# Patient Record
Sex: MALE | Race: WHITE | NOT HISPANIC OR LATINO | Employment: OTHER | URBAN - METROPOLITAN AREA
[De-identification: names, ages, dates, MRNs, and addresses within clinical notes are randomized per-mention and may not be internally consistent; named-entity substitution may affect disease eponyms.]

---

## 2017-01-02 PROBLEM — E83.42 HYPOMAGNESEMIA: Status: ACTIVE | Noted: 2017-01-02

## 2017-01-03 PROBLEM — E83.42 HYPOMAGNESEMIA: Status: RESOLVED | Noted: 2017-01-02 | Resolved: 2017-01-03

## 2017-01-09 ENCOUNTER — TELEPHONE (OUTPATIENT)
Dept: FAMILY MEDICINE | Facility: CLINIC | Age: 82
End: 2017-01-09

## 2017-01-09 NOTE — TELEPHONE ENCOUNTER
----- Message from Nova Chand sent at 1/9/2017  1:16 PM CST -----  Contact: Issac Conn [phone]  Issac Conn is calling to get orders for PT. He can take a verbal order and can would like to explain to the nurse on the phone. Please call  [phone].

## 2017-01-10 ENCOUNTER — OFFICE VISIT (OUTPATIENT)
Dept: FAMILY MEDICINE | Facility: CLINIC | Age: 82
End: 2017-01-10
Payer: MEDICARE

## 2017-01-10 VITALS
RESPIRATION RATE: 18 BRPM | BODY MASS INDEX: 24.55 KG/M2 | DIASTOLIC BLOOD PRESSURE: 60 MMHG | WEIGHT: 171.5 LBS | OXYGEN SATURATION: 98 % | SYSTOLIC BLOOD PRESSURE: 140 MMHG | HEIGHT: 70 IN | HEART RATE: 77 BPM

## 2017-01-10 DIAGNOSIS — E78.5 HYPERLIPIDEMIA, UNSPECIFIED HYPERLIPIDEMIA TYPE: ICD-10-CM

## 2017-01-10 DIAGNOSIS — E03.9 ACQUIRED HYPOTHYROIDISM: ICD-10-CM

## 2017-01-10 DIAGNOSIS — I12.9 CKD STAGE 4 SECONDARY TO HYPERTENSION: ICD-10-CM

## 2017-01-10 DIAGNOSIS — N18.4 CKD STAGE 4 SECONDARY TO HYPERTENSION: ICD-10-CM

## 2017-01-10 DIAGNOSIS — I25.118 CORONARY ARTERY DISEASE INVOLVING NATIVE CORONARY ARTERY WITH OTHER FORM OF ANGINA PECTORIS, UNSPECIFIED WHETHER NATIVE OR TRANSPLANTED HEART: ICD-10-CM

## 2017-01-10 DIAGNOSIS — Z09 HOSPITAL DISCHARGE FOLLOW-UP: Primary | ICD-10-CM

## 2017-01-10 PROCEDURE — 99204 OFFICE O/P NEW MOD 45 MIN: CPT | Mod: S$PBB,,, | Performed by: FAMILY MEDICINE

## 2017-01-10 PROCEDURE — 99213 OFFICE O/P EST LOW 20 MIN: CPT | Mod: PBBFAC,PO | Performed by: FAMILY MEDICINE

## 2017-01-10 PROCEDURE — 99999 PR PBB SHADOW E&M-EST. PATIENT-LVL III: CPT | Mod: PBBFAC,,, | Performed by: FAMILY MEDICINE

## 2017-01-10 RX ORDER — BLOOD SUGAR DIAGNOSTIC
STRIP MISCELLANEOUS DAILY
COMMUNITY
Start: 2016-11-28

## 2017-01-10 RX ORDER — OMEGA-3-ACID ETHYL ESTERS 1 G/1
1 CAPSULE, LIQUID FILLED ORAL 2 TIMES DAILY
Qty: 60 CAPSULE | Refills: 3 | Status: SHIPPED | OUTPATIENT
Start: 2017-01-10 | End: 2017-05-12 | Stop reason: SDUPTHER

## 2017-01-10 NOTE — MR AVS SNAPSHOT
[facility]  [address]  [place] [address]  Phone: [phone]  Fax: [phone]                  Jose Maria Jr.   1/10/2017 10:00 AM   Office Visit    Description:  Male : 1932   Provider:  Lamar Felix MD   Department:  [facility]           Reason for Visit     Hospital Follow Up           Diagnoses this Visit        Comments    Hospital discharge follow-up    -  Primary            To Do List           Goals (5 Years of Data)     None      Follow-Up and Disposition     Return in about 3 months (around 4/10/2017).       These Medications        Disp Refills Start End    omega-3 acid ethyl esters (LOVAZA) 1 gram capsule 60 capsule 3 1/10/2017     Take 1 capsule (1 g total) by mouth 2 (two) times daily. - Oral    Pharmacy: [facility] [place], [place] - [address] Ph #: [phone]         [facility] On Call     [facility] On Call Nurse [facility] Line -  Assistance  Registered nurses in the [facility] On Call Center provide clinical advisement, health education, appointment booking, and other advisory services.  Call for this free service at [phone].             Medications           Message regarding Medications     Verify the changes and/or additions to your medication regime listed below are the same as discussed with your clinician today.  If any of these changes or additions are incorrect, please notify your healthcare provider.        CHANGE how you are taking these medications     Start Taking Instead of    omega-3 acid ethyl esters (LOVAZA) 1 gram capsule omega-3 acid ethyl esters (LOVAZA) 1 gram capsule    Dosage:  Take 1 capsule (1 g total) by mouth 2 (two) times daily. Dosage:  Take 1 g by mouth 2 (two) times daily.    Reason for Change:  Reorder            Verify that the below list of medications is an accurate representation of the medications you are currently taking.  If none reported, the list may be blank. If incorrect,  please contact your healthcare provider. Carry this list with you in case of emergency.           Current Medications     ACCU-CHEK TANIAK PLUS TEST STRP Strp     albuterol 90 mcg/actuation inhaler Inhale 2 puffs into the lungs every 6 (six) hours as needed for Wheezing.    allopurinol (ZYLOPRIM) 100 MG tablet Take 1 tablet (100 mg total) by mouth once daily.    alprazolam (XANAX) 0.5 MG tablet Take 1 tablet (0.5 mg total) by mouth 2 (two) times daily as needed for Anxiety.    amlodipine (NORVASC) 10 MG tablet Take 1 tablet (10 mg total) by mouth once daily.    aspirin 81 MG Chew Take 1 tablet (81 mg total) by mouth once daily.    benzonatate (TESSALON) 100 MG capsule Take 1 capsule (100 mg total) by mouth 3 (three) times daily as needed for Cough.    clopidogrel (PLAVIX) 75 mg tablet Take 75 mg by mouth once daily.     cyanocobalamin 500 mcg tablet Take 2 tablets (1,000 mcg total) by mouth once daily.    finasteride (PROSCAR) 5 mg tablet Take 5 mg by mouth once daily.     guaifenesin (MUCINEX) 600 mg 12 hr tablet Take 1 tablet (600 mg total) by mouth 2 (two) times daily.    hydrALAZINE (APRESOLINE) 100 MG tablet Take 1 tablet (100 mg total) by mouth every 8 (eight) hours.    levoFLOXacin (LEVAQUIN) 250 MG tablet Take 1 tablet (250 mg total) by mouth every other day.    levothyroxine (SYNTHROID) 25 MCG tablet Take 25 mcg by mouth once daily.    MAGNESIUM OXIDE (MAG-OXIDE ORAL) Take 240 mg by mouth once daily.    metoprolol tartrate (LOPRESSOR) 25 MG tablet Take 1/2 tablet (12.5mg) by mouth twice daily    nitroGLYCERIN (NITROSTAT) 0.4 MG SL tablet Place 1 tablet (0.4 mg total) under the tongue every 5 (five) minutes as needed for Chest pain.    omega-3 acid ethyl esters (LOVAZA) 1 gram capsule Take 1 capsule (1 g total) by mouth 2 (two) times daily.    omeprazole (PRILOSEC) 40 MG capsule Take 40 mg by mouth once daily.    ondansetron (ZOFRAN) 4 MG tablet Take 1 tablet (4 mg total) by mouth every 6 (six) hours as  "needed for Nausea.    rosuvastatin (CRESTOR) 5 MG tablet Take 5 mg by mouth every Mon, Wed, Fri.     tamsulosin (FLOMAX) 0.4 mg Cp24 Take 0.4 mg by mouth once daily.    tramadol (ULTRAM) 50 mg tablet Take 1 tablet (50 mg total) by mouth every 6 (six) hours as needed for Pain.           Clinical Reference Information           Vital Signs - Last Recorded  Most recent update: 1/10/2017 10:06 AM by Renuka Singh LPN    BP Pulse Resp Ht Wt SpO2    (!) 140/60 (BP Location: Left arm, Patient Position: Sitting, BP Method: Manual) 77 18 5' 10" (1.778 m) 77.8 kg (171 lb 8.3 oz) 98%    BMI                24.61 kg/m2          Blood Pressure          Most Recent Value    BP  (!)  140/60      Allergies as of 1/10/2017     Benadryl [Diphenhydramine Hcl]    Versed [Midazolam]      Immunizations Administered on Date of Encounter - 1/10/2017     None      [facility] Sign-Up     Activating your [facility] account is as easy as 1-2-3!     1) Visit [email or web address], select Sign Up Now, enter this activation code and your date of birth, then select Next.  [email or web address]  Expires: 2/17/2017  2:44 PM      2) Create a username and password to use when you visit [facility] in the future and select a security question in case you lose your password and select Next.    3) Enter your e-mail address and click Sign Up!    Additional Information  If you have questions, please e-mail [email or web address] or call [phone] to talk to our [facility] staff. Remember, [facility] is NOT to be used for urgent needs. For medical emergencies, dial 911.         "

## 2017-01-10 NOTE — PROGRESS NOTES
Subjective:       Patient ID: Jose Maria Jr. is a 84 y.o. male.    Chief Complaint: Hospital Follow Up    HPI 84 year old male here for hospital follow up. He was discharged on 1/3/17 for pneumonia. He was subsequently found to have CHF excarbation, LORENZO on CKD, and was transfused one unit PRBC due to anemia of chronic disease. Patient is taking levaquin every other day and has one pill left. Appetite is slowly returning to baseline. Patient is ambulating without difficulty.   Patient denies diarrhea/vomiting. Patient urinates frequently, which is his baseline.   In January of 2016, patient had a MI and had two cardiac stents placed. He takes asa and plavix. In 1991, patient had a 2 vessel bypass.   Paitent had CKD for past 30 years but during hospitalization was found to have LORENZO on CKD. Patient followed up with Nephrology yesterday.   Patient lives at home with wife. He enjoys cooking. He is able to perform activities of daily living without difficulty. He currently has PT/OT and home health which he finds extremely helpful. No recent falls. He has a walker if needed.   Review of Systems   Constitutional: Negative for chills and fever.   Respiratory: Positive for cough. Negative for chest tightness and shortness of breath.    Cardiovascular: Negative for chest pain and leg swelling.   Gastrointestinal: Negative for abdominal pain, diarrhea, nausea and vomiting.   Genitourinary: Negative for dysuria and hematuria.   Psychiatric/Behavioral: Negative for agitation and behavioral problems.       Objective:      Vitals:    01/10/17 1005   BP: (!) 140/60   Pulse: 77   Resp: 18   o2 sats 98% on RA  Physical Exam   Constitutional: He is oriented to person, place, and time. He appears well-developed and well-nourished.   HENT:   Head: Normocephalic and atraumatic.   Mouth/Throat: No oropharyngeal exudate.   Eyes: EOM are normal. Right eye exhibits no discharge. Left eye exhibits no discharge.   Cardiovascular: Normal  rate and regular rhythm.    Pulmonary/Chest: Effort normal. He has no wheezes.   Abdominal: Soft. There is no tenderness.   Musculoskeletal: He exhibits no edema or tenderness.   Neurological: He is alert and oriented to person, place, and time.   Psychiatric: He has a normal mood and affect. His behavior is normal.   Vitals reviewed.      Assessment:       1. Hospital discharge follow-up    2. Coronary artery disease involving native coronary artery with other form of angina pectoris, unspecified whether native or transplanted heart    3. 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 Acquired hypothyroidism    5. Hyperlipidemia, unspecified hyperlipidemia type        Plan:         1. Hospital discharge follow up for pneumonia: doing well. Finishing abx. Breathing without difficulty. Albuterol prn. Tessalon prn.   2. CKD: followed by . Labs pending.   3. CAD: on asa plavix. Patient to schedule f/u appointment with  for next week. Continue lovaza and crestor  4. HTN: above goal of 130/90. Will monitor. Continue hydralazine, metoprolol. Lisinpril and lasix held until evaluation by nephro/cardio  5. 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  Dispense: 60 capsule; Refill: 3      Return in about 3 months (around 4/10/2017).

## 2017-01-10 NOTE — TELEPHONE ENCOUNTER
----- Message from Sarah Beth Diaz sent at 1/10/2017  9:11 AM CST -----  Contact: [facility].[phone]  Need verbal orders for physical therapy.

## 2017-01-11 ENCOUNTER — TELEPHONE (OUTPATIENT)
Dept: FAMILY MEDICINE | Facility: CLINIC | Age: 82
End: 2017-01-11

## 2017-01-11 NOTE — TELEPHONE ENCOUNTER
----- Message from Nova Chand sent at 1/11/2017  8:52 AM CST -----  Issac with [facility] is calling to get verbal order on PT/OT for pt.

## 2017-01-20 PROBLEM — B34.9 VIRAL SYNDROME: Status: ACTIVE | Noted: 2017-01-20

## 2017-01-21 PROBLEM — N17.9 ACUTE RENAL FAILURE: Status: ACTIVE | Noted: 2017-01-21

## 2017-02-05 PROBLEM — N28.1 RENAL CYST: Status: ACTIVE | Noted: 2017-02-05

## 2017-02-05 PROBLEM — R80.1 PERSISTENT PROTEINURIA: Status: ACTIVE | Noted: 2017-02-05

## 2017-02-05 PROBLEM — N25.81 SECONDARY HYPERPARATHYROIDISM: Status: ACTIVE | Noted: 2017-02-05

## 2017-02-13 ENCOUNTER — PATIENT MESSAGE (OUTPATIENT)
Dept: FAMILY MEDICINE | Facility: CLINIC | Age: 82
End: 2017-02-13

## 2017-02-16 ENCOUNTER — OFFICE VISIT (OUTPATIENT)
Dept: FAMILY MEDICINE | Facility: CLINIC | Age: 82
End: 2017-02-16
Payer: MEDICARE

## 2017-02-16 VITALS
WEIGHT: 165.13 LBS | SYSTOLIC BLOOD PRESSURE: 150 MMHG | RESPIRATION RATE: 16 BRPM | HEART RATE: 77 BPM | DIASTOLIC BLOOD PRESSURE: 78 MMHG | OXYGEN SATURATION: 96 % | BODY MASS INDEX: 23.69 KG/M2

## 2017-02-16 DIAGNOSIS — F41.9 ANXIETY: Primary | ICD-10-CM

## 2017-02-16 DIAGNOSIS — I10 ESSENTIAL HYPERTENSION: ICD-10-CM

## 2017-02-16 PROBLEM — N17.9 ACUTE RENAL FAILURE: Status: RESOLVED | Noted: 2017-01-21 | Resolved: 2017-02-16

## 2017-02-16 PROBLEM — B34.9 VIRAL SYNDROME: Status: RESOLVED | Noted: 2017-01-20 | Resolved: 2017-02-16

## 2017-02-16 PROCEDURE — 99213 OFFICE O/P EST LOW 20 MIN: CPT | Mod: S$PBB,,, | Performed by: FAMILY MEDICINE

## 2017-02-16 PROCEDURE — 99213 OFFICE O/P EST LOW 20 MIN: CPT | Mod: PBBFAC,PO | Performed by: FAMILY MEDICINE

## 2017-02-16 PROCEDURE — 99999 PR PBB SHADOW E&M-EST. PATIENT-LVL III: CPT | Mod: PBBFAC,,, | Performed by: FAMILY MEDICINE

## 2017-02-16 RX ORDER — ALPRAZOLAM 0.25 MG/1
TABLET ORAL
Refills: 0 | COMMUNITY
Start: 2017-01-22 | End: 2017-03-10 | Stop reason: SDUPTHER

## 2017-02-16 RX ORDER — ESCITALOPRAM OXALATE 10 MG/1
10 TABLET ORAL DAILY
Qty: 30 TABLET | Refills: 0 | Status: SHIPPED | OUTPATIENT
Start: 2017-02-16 | End: 2017-02-24

## 2017-02-16 NOTE — PROGRESS NOTES
Subjective:       Patient ID: Jose Maria Jr. is a 84 y.o. male.    Chief Complaint: Follow-up    HPI 84 year old male here with his wife for ED follow up for SOB. Patient had EKG/CXR which were normal. Patients symptoms were consistent with panic attack. Patient states he has life long anxiety. Patient has been taking xanax prn. He takes 0.25 mg at 8 pm and states he still feels drowsy throughout the following day.  He is interested in taking maintenance medication to help with anxiety. No suicidal thoughts/homicidal thoughts. Patient has lost interest in his hobbies. He denies CP/SOB. No change in appetite.     Review of Systems   Constitutional: Positive for fatigue. Negative for chills and fever.   Respiratory: Negative for chest tightness and shortness of breath.    Cardiovascular: Negative for chest pain and leg swelling.   Gastrointestinal: Negative for abdominal pain, diarrhea, nausea and vomiting.   Psychiatric/Behavioral: Positive for dysphoric mood. Negative for self-injury, sleep disturbance and suicidal ideas. The patient is nervous/anxious.        Objective:      Vitals:    02/16/17 0910   BP: (!) 150/78   Pulse: 77   Resp: 16     Physical Exam   Constitutional: He is oriented to person, place, and time. He appears well-developed and well-nourished. No distress.   Cardiovascular: Normal rate and regular rhythm.    Pulmonary/Chest: Effort normal. He has no wheezes.   Abdominal: Soft. There is no tenderness.   Musculoskeletal: He exhibits no edema or tenderness.   Neurological: He is alert and oriented to person, place, and time.   Psychiatric: He has a normal mood and affect. His behavior is normal. Judgment and thought content normal.   Vitals reviewed.      Assessment:       1. Anxiety    2. Essential hypertension        Plan:       Anxiety  -start lexapro, side effects reviewed with patient and wife. Advised on taking xanax only if absolutely needed due to prolonged effects.   -advised on  continuing hobbies as tolerated.   RTC 3 weeks for f/u.    Essential hypertension  Above goal. Likely anxiety as patient has had elevated BP at previous visits. Will monitor.     Other orders  -     escitalopram oxalate (LEXAPRO) 10 MG tablet; Take 1 tablet (10 mg total) by mouth once daily.  Dispense: 30 tablet; Refill: 0      Return in about 3 weeks (around 3/9/2017).

## 2017-02-16 NOTE — MR AVS SNAPSHOT
[facility]  [address]  [place] [address]  Phone: [phone]  Fax: [phone]                  Jose Maria Jr.   2017 9:20 AM   Office Visit    Description:  Male : 1932   Provider:  Lamar Felix MD   Department:  [facility]           Reason for Visit     Follow-up           Diagnoses this Visit        Comments    Anxiety    -  Primary     Essential hypertension                To Do List           Future Appointments        Provider Department Dept Phone    2017 9:00 AM Lamar Felix MD [facility] [phone]      Goals (5 Years of Data)     None      Follow-Up and Disposition     Return in about 3 weeks (around 3/9/2017).       These Medications        Disp Refills Start End    escitalopram oxalate (LEXAPRO) 10 MG tablet 30 tablet 0 2017    Take 1 tablet (10 mg total) by mouth once daily. - Oral    Pharmacy: [facility] [place] - [address] Ph #: [phone]         [facility] On Call     [facility] On Call Nurse Care Line -  Assistance  Registered nurses in the [facility] On Call Center provide clinical advisement, health education, appointment booking, and other advisory services.  Call for this free service at [phone].             Medications           Message regarding Medications     Verify the changes and/or additions to your medication regime listed below are the same as discussed with your clinician today.  If any of these changes or additions are incorrect, please notify your healthcare provider.        START taking these NEW medications        Refills    escitalopram oxalate (LEXAPRO) 10 MG tablet 0    Sig: Take 1 tablet (10 mg total) by mouth once daily.    Class: Normal    Route: Oral           Verify that the below list of medications is an accurate representation of the medications you are currently taking.  If none reported, the list may be blank. If incorrect,  please contact your healthcare provider. Carry this list with you in case of emergency.           Current Medications     ACCU-CHEK TANIKA PLUS TEST STRP Strp     albuterol 90 mcg/actuation inhaler Inhale 2 puffs into the lungs every 6 (six) hours as needed for Wheezing.    allopurinol (ZYLOPRIM) 100 MG tablet Take 1 tablet (100 mg total) by mouth once daily.    alprazolam (XANAX) 0.25 MG tablet TAKE 1 TABLET BY MOUTH AT BEDTIME AS NEEDED INSOMNIA    amlodipine (NORVASC) 10 MG tablet Take 1 tablet (10 mg total) by mouth once daily.    aspirin 81 MG Chew Take 1 tablet (81 mg total) by mouth once daily.    clopidogrel (PLAVIX) 75 mg tablet Take 75 mg by mouth once daily.     cyanocobalamin 500 mcg tablet Take 2 tablets (1,000 mcg total) by mouth once daily.    ferrous sulfate 324 mg (65 mg iron) TbEC Take 1 tablet (325 mg total) by mouth 2 (two) times daily.    finasteride (PROSCAR) 5 mg tablet Take 5 mg by mouth once daily.     guaifenesin (MUCINEX) 600 mg 12 hr tablet Take 1 tablet (600 mg total) by mouth 2 (two) times daily.    hydrALAZINE (APRESOLINE) 100 MG tablet Take 1 tablet (100 mg total) by mouth every 8 (eight) hours.    levothyroxine (SYNTHROID) 25 MCG tablet Take 25 mcg by mouth once daily.    MAGNESIUM OXIDE (MAG-OXIDE ORAL) Take 400 mg by mouth 2 (two) times daily.     metoprolol tartrate (LOPRESSOR) 25 MG tablet Take 1/2 tablet (12.5mg) by mouth twice daily    nitroGLYCERIN (NITROSTAT) 0.4 MG SL tablet Place 1 tablet (0.4 mg total) under the tongue every 5 (five) minutes as needed for Chest pain.    omega-3 acid ethyl esters (LOVAZA) 1 gram capsule Take 1 capsule (1 g total) by mouth 2 (two) times daily.    ondansetron (ZOFRAN) 4 MG tablet Take 1 tablet (4 mg total) by mouth every 6 (six) hours as needed for Nausea.    rosuvastatin (CRESTOR) 5 MG tablet Take 5 mg by mouth every Mon, Wed, Fri.     tamsulosin (FLOMAX) 0.4 mg Cp24 Take 0.4 mg by mouth once daily.    escitalopram oxalate (LEXAPRO) 10 MG  tablet Take 1 tablet (10 mg total) by mouth once daily.           Clinical Reference Information           Your Vitals Were     BP Pulse Resp Weight SpO2 BMI    150/78 (BP Location: Right arm, Patient Position: Sitting, BP Method: Manual) 77 16 74.9 kg (165 lb 2 oz) 96% 23.69 kg/m2      Blood Pressure          Most Recent Value    BP  (!)  150/78      Allergies as of 2/16/2017     Benadryl [Diphenhydramine Hcl]    Versed [Midazolam]      Immunizations Administered on Date of Encounter - 2/16/2017     None      Language Assistance Services     ATTENTION: Language assistance services are available, free of charge. Please call [phone].      ATENCIÓN: Si habla jasper, tiene a mack disposición servicios gratuitos de asistencia lingüística. Llame al [phone].     CHÚ Ý: N?u b?n nói Ti?ng Vi?t, có các d?ch v? h? tr? ngôn ng? mi?n phí dành cho b?n. G?i s? [phone].         [facility] complies with applicable Federal civil rights laws and does not discriminate on the basis of race, color, national origin, age, disability, or sex.

## 2017-02-20 ENCOUNTER — TELEPHONE (OUTPATIENT)
Dept: FAMILY MEDICINE | Facility: CLINIC | Age: 82
End: 2017-02-20

## 2017-02-20 NOTE — TELEPHONE ENCOUNTER
----- Message from Nova Chand sent at 2/20/2017  3:09 PM CST -----  Contact: Ms Maria [phone]  REFILLS:   Patient is requesting a medication refill.   RX name: Hydralazine   Strength: 100 mg  Directions:   Pharmacy name: [facility]   Pharmacy phone #:

## 2017-03-03 ENCOUNTER — PATIENT MESSAGE (OUTPATIENT)
Dept: FAMILY MEDICINE | Facility: CLINIC | Age: 82
End: 2017-03-03

## 2017-03-06 ENCOUNTER — PATIENT MESSAGE (OUTPATIENT)
Dept: FAMILY MEDICINE | Facility: CLINIC | Age: 82
End: 2017-03-06

## 2017-03-08 ENCOUNTER — PATIENT MESSAGE (OUTPATIENT)
Dept: FAMILY MEDICINE | Facility: CLINIC | Age: 82
End: 2017-03-08

## 2017-03-10 ENCOUNTER — OFFICE VISIT (OUTPATIENT)
Dept: FAMILY MEDICINE | Facility: CLINIC | Age: 82
End: 2017-03-10
Payer: MEDICARE

## 2017-03-10 VITALS
SYSTOLIC BLOOD PRESSURE: 150 MMHG | WEIGHT: 157.44 LBS | DIASTOLIC BLOOD PRESSURE: 88 MMHG | RESPIRATION RATE: 16 BRPM | OXYGEN SATURATION: 98 % | HEART RATE: 68 BPM | BODY MASS INDEX: 22.59 KG/M2

## 2017-03-10 DIAGNOSIS — F41.9 ANXIETY: ICD-10-CM

## 2017-03-10 DIAGNOSIS — N18.4 CKD STAGE 4 SECONDARY TO HYPERTENSION: ICD-10-CM

## 2017-03-10 DIAGNOSIS — I12.9 CKD STAGE 4 SECONDARY TO HYPERTENSION: ICD-10-CM

## 2017-03-10 DIAGNOSIS — I10 ESSENTIAL HYPERTENSION: Primary | ICD-10-CM

## 2017-03-10 PROCEDURE — 99999 PR PBB SHADOW E&M-EST. PATIENT-LVL III: CPT | Mod: PBBFAC,,, | Performed by: FAMILY MEDICINE

## 2017-03-10 PROCEDURE — 99213 OFFICE O/P EST LOW 20 MIN: CPT | Mod: S$PBB,,, | Performed by: FAMILY MEDICINE

## 2017-03-10 PROCEDURE — 99213 OFFICE O/P EST LOW 20 MIN: CPT | Mod: PBBFAC,PO | Performed by: FAMILY MEDICINE

## 2017-03-10 RX ORDER — HYDRALAZINE HYDROCHLORIDE 50 MG/1
50 TABLET, FILM COATED ORAL EVERY 8 HOURS PRN
Qty: 90 TABLET | Refills: 0 | Status: SHIPPED | OUTPATIENT
Start: 2017-03-10 | End: 2017-03-22 | Stop reason: SDUPTHER

## 2017-03-10 RX ORDER — ALPRAZOLAM 0.25 MG/1
0.25 TABLET ORAL NIGHTLY PRN
Qty: 60 TABLET | Refills: 0 | Status: SHIPPED | OUTPATIENT
Start: 2017-03-10 | End: 2017-11-01

## 2017-03-10 RX ORDER — PARICALCITOL 1 UG/1
CAPSULE, LIQUID FILLED ORAL
Status: ON HOLD | COMMUNITY
Start: 2017-02-20 | End: 2017-03-18 | Stop reason: HOSPADM

## 2017-03-10 NOTE — MR AVS SNAPSHOT
[facility]  [address] [place] [address]  Phone: [phone]  Fax: [phone]                  Jose Maria Jr.   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       These Medications        Disp Refills Start End    hydrALAZINE (APRESOLINE) 50 MG tablet 90 tablet 0 3/10/2017 3/10/2018    Take 1 tablet (50 mg total) by mouth every 8 (eight) hours as needed (if systolic blood pressure (top number) is greater than 140). - Oral    Pharmacy: [facility] [place] [address] Ph #: [phone]       alprazolam (XANAX) 0.25 MG tablet 60 tablet 0 3/10/2017     Take 1 tablet (0.25 mg total) by mouth nightly as needed for Anxiety. - Oral    Pharmacy: [facility] [place] [address] Ph #: [phone]         [facility] On Call     [facility] On Call Nurse Care Line -  Assistance  Registered nurses in the [facility] On Call Center provide clinical advisement, health education, appointment booking, and other advisory services.  Call for this free service at [phone].             Medications           Message regarding Medications     Verify the changes and/or additions to your medication regime listed below are the same as discussed with your clinician today.  If any of these changes or additions are incorrect, please notify your healthcare provider.        START taking these NEW medications        Refills    hydrALAZINE (APRESOLINE) 50 MG tablet 0    Sig: Take 1 tablet (50 mg total) by mouth every 8 (eight) hours as needed (if  systolic blood pressure (top number) is greater than 140).    Class: Normal    Route: Oral      CHANGE how you are taking these medications     Start Taking Instead of    alprazolam (XANAX) 0.25 MG tablet alprazolam (XANAX) 0.25 MG tablet    Dosage:  Take 1 tablet (0.25 mg total) by mouth nightly as needed for Anxiety. Dosage:  TAKE 1 TABLET BY MOUTH AT BEDTIME AS NEEDED INSOMNIA    Reason for Change:  Reorder            Verify that the below list of medications is an accurate representation of the medications you are currently taking.  If none reported, the list may be blank. If incorrect, please contact your healthcare provider. Carry this list with you in case of emergency.           Current Medications     ACCU-CHEK TANIKA PLUS TEST STRP Strp     albuterol 90 mcg/actuation inhaler Inhale 2 puffs into the lungs every 6 (six) hours as needed for Wheezing.    allopurinol (ZYLOPRIM) 100 MG tablet Take 1 tablet (100 mg total) by mouth once daily.    alprazolam (XANAX) 0.25 MG tablet Take 1 tablet (0.25 mg total) by mouth nightly as needed for Anxiety.    amlodipine (NORVASC) 10 MG tablet Take 1 tablet (10 mg total) by mouth once daily.    aspirin 81 MG Chew Take 1 tablet (81 mg total) by mouth once daily.    clopidogrel (PLAVIX) 75 mg tablet Take 75 mg by mouth once daily.     cyanocobalamin 500 mcg tablet Take 2 tablets (1,000 mcg total) by mouth once daily.    ferrous sulfate 324 mg (65 mg iron) TbEC Take 1 tablet (325 mg total) by mouth 2 (two) times daily.    finasteride (PROSCAR) 5 mg tablet Take 5 mg by mouth once daily.     guaifenesin (MUCINEX) 600 mg 12 hr tablet Take 1 tablet (600 mg total) by mouth 2 (two) times daily.    levothyroxine (SYNTHROID) 25 MCG tablet Take 25 mcg by mouth once daily.    lisinopril (PRINIVIL,ZESTRIL) 20 MG tablet Take 1 tablet (20 mg total) by mouth once daily.    MAGNESIUM OXIDE (MAG-OXIDE ORAL) Take 400 mg by mouth 2 (two) times daily.     metoprolol tartrate (LOPRESSOR) 25 MG  tablet Take 1/2 tablet (12.5mg) by mouth twice daily    nitroGLYCERIN (NITROSTAT) 0.4 MG SL tablet Place 1 tablet (0.4 mg total) under the tongue every 5 (five) minutes as needed for Chest pain.    omega-3 acid ethyl esters (LOVAZA) 1 gram capsule Take 1 capsule (1 g total) by mouth 2 (two) times daily.    ondansetron (ZOFRAN) 4 MG tablet Take 1 tablet (4 mg total) by mouth every 6 (six) hours as needed for Nausea.    paricalcitol (ZEMPLAR) 1 MCG capsule Take 1 capsule (1 mcg total) by mouth once daily.    paricalcitol (ZEMPLAR) 1 MCG capsule     rosuvastatin (CRESTOR) 5 MG tablet Take 5 mg by mouth every Mon, Wed, Fri.     tamsulosin (FLOMAX) 0.4 mg Cp24 Take 0.4 mg by mouth once daily.    hydrALAZINE (APRESOLINE) 50 MG tablet Take 1 tablet (50 mg total) by mouth every 8 (eight) hours as needed (if systolic blood pressure (top number) is greater than 140).           Clinical Reference Information           Your Vitals Were     BP Pulse Resp Weight SpO2 BMI    150/88 (BP Location: Right arm, Patient Position: Sitting, BP Method: Manual) 68 16 71.4 kg (157 lb 6.5 oz) 98% 22.59 kg/m2      Blood Pressure          Most Recent Value    BP  (!)  150/88      Allergies as of 3/10/2017     Benadryl [Diphenhydramine Hcl]    Versed [Midazolam]      Immunizations Administered on Date of Encounter - 3/10/2017     None      Language Assistance Services     ATTENTION: Language assistance services are available, free of charge. Please call [phone].      ATENCIÓN: Si carlylela jasper, tiene a mack disposición servicios gratuitos de asistencia lingüística. Llame al [phone].     NYDIA Ý: N?u b?n nói Ti?ng Vi?t, có các d?ch v? h? tr? ngôn ng? mi?n phí dành cho b?n. G?i s? [phone].         [facility] complies with applicable Federal civil rights laws and does not discriminate on the basis of race, color, national origin, age, disability, or sex.

## 2017-03-10 NOTE — PROGRESS NOTES
Subjective:       Patient ID: Jose Maria Jr. is a 84 y.o. male.    Chief Complaint: 3 week follow up    HPI 84 year old with his wife for follow up. Patient was seen in ED for side effects of lexapro. He was taken off that medication and has been taking 0.25 mg of xanax nightly which has helped patient. He states he has a little drowsiness in the am from the medication. No recent falls/dizziness. He is worried he will have to go on dialysis. Patient is seen by . He has HTN which is difficult to control. He brought in BP log which shows elevated BP with SBP ranging 140-160s. He has not been using hydralazine prn.   He has been working out regularly with range of motion exercises   Review of Systems   Constitutional: Negative for chills and fever.   Respiratory: Negative for chest tightness and shortness of breath.    Cardiovascular: Negative for chest pain and leg swelling.   Gastrointestinal: Negative for abdominal pain, diarrhea, nausea and vomiting.   Genitourinary: Negative for dysuria.   Neurological: Positive for weakness. Negative for headaches.   Psychiatric/Behavioral: The patient is nervous/anxious.        Objective:      Vitals:    03/10/17 0833   BP: (!) 150/88   Pulse: 68   Resp: 16     Physical Exam   Constitutional: He is oriented to person, place, and time. He appears well-developed and well-nourished. No distress.   Cardiovascular: Normal rate and regular rhythm.    Pulmonary/Chest: Effort normal. He has no wheezes.   Abdominal: Soft. There is no tenderness. There is no rebound and no guarding.   Neurological: He is alert and oriented to person, place, and time.   Psychiatric: He has a normal mood and affect. His behavior is normal. Judgment and thought content normal.   Vitals reviewed.      Assessment:       1. Essential hypertension    2. 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 HTN: uncontrolled. Given hydralazine to use PRN if SBP >140. Appointment with   in 1 week. advised on importance of strict BP control as to not worsen CKD. Patient advised on avoiding nsaids and importance of adequate hydration.   2. Anxiety/insomnia: tolerating lowest dose of xanax. I have advised patient on restarting hobbies and [employer] activities if hes interested.   3. RTC 3 months or sooner prn.   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  Dispense: 90 tablet; Refill: 0  -     alprazolam (XANAX) 0.25 MG tablet; Take 1 tablet (0.25 mg total) by mouth nightly as needed for Anxiety.  Dispense: 60 tablet; Refill: 0      Return in about 3 months (around 6/10/2017).

## 2017-03-13 ENCOUNTER — TELEPHONE (OUTPATIENT)
Dept: FAMILY MEDICINE | Facility: CLINIC | Age: 82
End: 2017-03-13

## 2017-03-13 NOTE — TELEPHONE ENCOUNTER
----- Message from Shiloh Morales sent at 3/13/2017  9:14 AM CDT -----  Came by office looking for xanax script   States was not given to them on last visit    Reach at   [phone]

## 2017-03-17 PROBLEM — R29.6 FREQUENT FALLS: Status: ACTIVE | Noted: 2017-03-17

## 2017-03-17 RX ORDER — LEVOTHYROXINE SODIUM 25 UG/1
25 TABLET ORAL DAILY
Qty: 30 TABLET | Refills: 3 | Status: SHIPPED | OUTPATIENT
Start: 2017-03-17 | End: 2017-05-18 | Stop reason: SDUPTHER

## 2017-03-18 PROBLEM — I95.1 ORTHOSTATIC HYPOTENSION: Status: ACTIVE | Noted: 2017-03-18

## 2017-04-17 PROBLEM — Z09 HOSPITAL DISCHARGE FOLLOW-UP: Status: RESOLVED | Noted: 2017-01-10 | Resolved: 2017-04-17

## 2017-05-12 RX ORDER — OMEGA-3-ACID ETHYL ESTERS 1 G/1
1 CAPSULE, LIQUID FILLED ORAL 2 TIMES DAILY
Qty: 60 CAPSULE | Refills: 3 | Status: SHIPPED | OUTPATIENT
Start: 2017-05-12 | End: 2019-01-21

## 2017-05-12 NOTE — TELEPHONE ENCOUNTER
----- Message from Shiloh Morales sent at 5/12/2017  9:15 AM CDT -----  Refill     OMEGA 3 ETHYL ESTERS 1 GM    [facility]

## 2017-05-18 ENCOUNTER — TELEPHONE (OUTPATIENT)
Dept: FAMILY MEDICINE | Facility: CLINIC | Age: 82
End: 2017-05-18

## 2017-05-18 DIAGNOSIS — E03.9 ACQUIRED HYPOTHYROIDISM: Primary | ICD-10-CM

## 2017-05-18 RX ORDER — LEVOTHYROXINE SODIUM 25 UG/1
25 TABLET ORAL DAILY
Qty: 90 TABLET | Refills: 3 | Status: SHIPPED | OUTPATIENT
Start: 2017-05-18 | End: 2018-06-05 | Stop reason: SDUPTHER

## 2017-05-18 NOTE — TELEPHONE ENCOUNTER
----- Message from Shiloh Morales sent at 5/18/2017  2:23 PM CDT -----  Refill of levothyroxine  25 mcg    One daily  Patient would like to get a 90 day  Supply of this sent to [facility]

## 2017-06-09 ENCOUNTER — OFFICE VISIT (OUTPATIENT)
Dept: FAMILY MEDICINE | Facility: CLINIC | Age: 82
End: 2017-06-09
Payer: MEDICARE

## 2017-06-09 VITALS
HEIGHT: 69 IN | BODY MASS INDEX: 23.8 KG/M2 | HEART RATE: 66 BPM | SYSTOLIC BLOOD PRESSURE: 138 MMHG | DIASTOLIC BLOOD PRESSURE: 70 MMHG | OXYGEN SATURATION: 98 % | RESPIRATION RATE: 16 BRPM | WEIGHT: 160.69 LBS

## 2017-06-09 DIAGNOSIS — R73.03 PREDIABETES: ICD-10-CM

## 2017-06-09 DIAGNOSIS — I10 ESSENTIAL HYPERTENSION: ICD-10-CM

## 2017-06-09 DIAGNOSIS — I12.9 CKD STAGE 4 SECONDARY TO HYPERTENSION: Primary | ICD-10-CM

## 2017-06-09 DIAGNOSIS — N18.4 CKD STAGE 4 SECONDARY TO HYPERTENSION: Primary | ICD-10-CM

## 2017-06-09 PROBLEM — R29.6 FREQUENT FALLS: Status: RESOLVED | Noted: 2017-03-17 | Resolved: 2017-06-09

## 2017-06-09 PROCEDURE — 99214 OFFICE O/P EST MOD 30 MIN: CPT | Mod: S$PBB,,, | Performed by: FAMILY MEDICINE

## 2017-06-09 PROCEDURE — 1126F AMNT PAIN NOTED NONE PRSNT: CPT | Mod: ,,, | Performed by: FAMILY MEDICINE

## 2017-06-09 PROCEDURE — 1159F MED LIST DOCD IN RCRD: CPT | Mod: ,,, | Performed by: FAMILY MEDICINE

## 2017-06-09 PROCEDURE — 99999 PR PBB SHADOW E&M-EST. PATIENT-LVL III: CPT | Mod: PBBFAC,,, | Performed by: FAMILY MEDICINE

## 2017-06-09 PROCEDURE — 99213 OFFICE O/P EST LOW 20 MIN: CPT | Mod: PBBFAC,PO | Performed by: FAMILY MEDICINE

## 2017-06-09 RX ORDER — FINASTERIDE 5 MG/1
5 TABLET, FILM COATED ORAL DAILY
COMMUNITY
End: 2018-03-28 | Stop reason: SDUPTHER

## 2017-06-09 NOTE — PROGRESS NOTES
Subjective:       Patient ID: Jose Maria Jr. is a 85 y.o. male.    Chief Complaint: check up    HPI 85 year old male here for a check up. Patient is active throughout his home. He sticks to a strict low salt diet. He has not had recent falls. He interacts with wife and family members regularly. He cuts grass and enjoys this..   Patient has CKD stage 4 and is being followed by . His weight is 160 lb which is stable. He has not had SOB or signs of volume overload.  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 Patient has HFpEF. Patient was recently taken off of plavix. He takes a daily 81 mg asa.   Patient has gout and takes allopurinol.  Patient has right knee OA and sees . He recently had fluid aspiration from the right knee. He is due to f/u in 4 months.  Patient has insomnia and takes xanax an hour before sleeping.  Patient takes 0.25 mg nightly.     Review of Systems   Constitutional: Negative for chills and fever.   Respiratory: Negative for chest tightness and shortness of breath.    Cardiovascular: Negative for chest pain and leg swelling.   Gastrointestinal: Negative for abdominal pain, diarrhea, nausea and vomiting.   Genitourinary: Negative for dysuria and hematuria.   Psychiatric/Behavioral: Negative for agitation and behavioral problems.       Objective:      Vitals:    06/09/17 0826   BP: (!) 144/64   Pulse: 66   Resp: 16     Physical Exam   Constitutional: He is oriented to person, place, and time. He appears well-developed and well-nourished. No distress.   Cardiovascular: Normal rate and regular rhythm.    Pulmonary/Chest: Effort normal. He has no wheezes.   Abdominal: Soft. There is no tenderness.   Musculoskeletal: He exhibits no edema or tenderness.   Neurological: He is alert and oriented to person, place, and time.   Skin: He is not diaphoretic.   Psychiatric: He has a normal mood and affect. His behavior is normal. Judgment and thought content normal.   Vitals  reviewed.      TSH on 5/10/17  2.47  Assessment:       1. 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 Essential hypertension    3. Acquired hypothyroidism    4. Prediabetes        Plan:         1. CKD-followed by nephro. Patient is doing well and recent GFR shows slight decrease. Per nephro notes, if GFR persistently below 20 will refer for avf placement.  2. HTN: well controlled  3. Prediabetes: check a1c.   RTC 6 months or sooner prn. Immunization records pending from previous PCP.   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

## 2017-08-13 PROBLEM — E83.39 HYPERPHOSPHATEMIA: Status: ACTIVE | Noted: 2017-08-13

## 2017-09-08 PROBLEM — R07.9 CHEST PAIN: Status: ACTIVE | Noted: 2017-09-08

## 2017-09-09 PROBLEM — K21.9 GASTROESOPHAGEAL REFLUX DISEASE WITHOUT ESOPHAGITIS: Status: ACTIVE | Noted: 2017-09-09

## 2017-09-10 ENCOUNTER — PATIENT MESSAGE (OUTPATIENT)
Dept: ADMINISTRATIVE | Facility: OTHER | Age: 82
End: 2017-09-10

## 2017-09-25 ENCOUNTER — HOSPITAL ENCOUNTER (INPATIENT)
Facility: HOSPITAL | Age: 82
LOS: 4 days | Discharge: HOME | DRG: 280 | End: 2017-09-30
Attending: EMERGENCY MEDICINE | Admitting: INTERNAL MEDICINE
Payer: MEDICARE

## 2017-09-25 DIAGNOSIS — R07.9 CHEST PAIN: ICD-10-CM

## 2017-09-25 DIAGNOSIS — I25.10 CAD (CORONARY ARTERY DISEASE), NATIVE CORONARY ARTERY: ICD-10-CM

## 2017-09-25 DIAGNOSIS — I21.4 NSTEMI (NON-ST ELEVATED MYOCARDIAL INFARCTION): Primary | ICD-10-CM

## 2017-09-25 DIAGNOSIS — I12.0 CKD STAGE 5 SECONDARY TO HYPERTENSION: ICD-10-CM

## 2017-09-25 DIAGNOSIS — N18.4 CKD (CHRONIC KIDNEY DISEASE) STAGE 4, GFR 15-29 ML/MIN: ICD-10-CM

## 2017-09-25 DIAGNOSIS — I25.118 CORONARY ARTERY DISEASE INVOLVING NATIVE CORONARY ARTERY WITH OTHER FORM OF ANGINA PECTORIS, UNSPECIFIED WHETHER NATIVE OR TRANSPLANTED HEART: ICD-10-CM

## 2017-09-25 DIAGNOSIS — Z95.1 S/P CABG (CORONARY ARTERY BYPASS GRAFT): ICD-10-CM

## 2017-09-25 DIAGNOSIS — I25.10 ATHEROSCLEROTIC HEART DISEASE OF NATIVE CORONARY ARTERY WITHOUT ANGINA PECTORIS: ICD-10-CM

## 2017-09-25 DIAGNOSIS — R06.02 SOB (SHORTNESS OF BREATH): ICD-10-CM

## 2017-09-25 DIAGNOSIS — I20.9 ANGINA PECTORIS: ICD-10-CM

## 2017-09-25 DIAGNOSIS — R07.9 CHEST PAIN, UNSPECIFIED TYPE: ICD-10-CM

## 2017-09-25 DIAGNOSIS — I50.9 CONGESTIVE HEART FAILURE, UNSPECIFIED CONGESTIVE HEART FAILURE CHRONICITY, UNSPECIFIED CONGESTIVE HEART FAILURE TYPE: ICD-10-CM

## 2017-09-25 DIAGNOSIS — E11.9 DM (DIABETES MELLITUS): ICD-10-CM

## 2017-09-25 DIAGNOSIS — N18.5 CKD STAGE 5 SECONDARY TO HYPERTENSION: ICD-10-CM

## 2017-09-25 DIAGNOSIS — I10 HTN (HYPERTENSION): ICD-10-CM

## 2017-09-25 LAB
ABO + RH BLD: NORMAL
ALBUMIN SERPL BCP-MCNC: 3.3 G/DL
ALP SERPL-CCNC: 74 U/L
ALT SERPL W/O P-5'-P-CCNC: 26 U/L
ANION GAP SERPL CALC-SCNC: 13 MMOL/L
AST SERPL-CCNC: 13 U/L
BASOPHILS # BLD AUTO: 0.01 K/UL
BASOPHILS NFR BLD: 0.2 %
BILIRUB SERPL-MCNC: 0.4 MG/DL
BLD GP AB SCN CELLS X3 SERPL QL: NORMAL
BNP SERPL-MCNC: 3051 PG/ML
BUN SERPL-MCNC: 64 MG/DL
BUN SERPL-MCNC: 64 MG/DL (ref 6–30)
CALCIUM SERPL-MCNC: 9.5 MG/DL
CHLORIDE SERPL-SCNC: 109 MMOL/L (ref 95–110)
CHLORIDE SERPL-SCNC: 110 MMOL/L
CO2 SERPL-SCNC: 18 MMOL/L
CREAT SERPL-MCNC: 4.6 MG/DL
CREAT SERPL-MCNC: 4.6 MG/DL (ref 0.5–1.4)
DIFFERENTIAL METHOD: ABNORMAL
EOSINOPHIL # BLD AUTO: 0 K/UL
EOSINOPHIL NFR BLD: 0 %
ERYTHROCYTE [DISTWIDTH] IN BLOOD BY AUTOMATED COUNT: 15 %
EST. GFR  (AFRICAN AMERICAN): 12.5 ML/MIN/1.73 M^2
EST. GFR  (NON AFRICAN AMERICAN): 10.8 ML/MIN/1.73 M^2
GLUCOSE SERPL-MCNC: 249 MG/DL (ref 70–110)
GLUCOSE SERPL-MCNC: 263 MG/DL
HCT VFR BLD AUTO: 31.7 %
HCT VFR BLD CALC: 30 %PCV (ref 36–54)
HGB BLD-MCNC: 10 G/DL
INR PPP: 1.1
LYMPHOCYTES # BLD AUTO: 0.6 K/UL
LYMPHOCYTES NFR BLD: 13 %
MCH RBC QN AUTO: 30.1 PG
MCHC RBC AUTO-ENTMCNC: 31.5 G/DL
MCV RBC AUTO: 96 FL
MONOCYTES # BLD AUTO: 0 K/UL
MONOCYTES NFR BLD: 0.9 %
NEUTROPHILS # BLD AUTO: 3.8 K/UL
NEUTROPHILS NFR BLD: 85.9 %
PLATELET # BLD AUTO: 212 K/UL
PMV BLD AUTO: 11.7 FL
POC IONIZED CALCIUM: 1.19 MMOL/L (ref 1.06–1.42)
POC TCO2 (MEASURED): 18 MMOL/L (ref 23–29)
POTASSIUM BLD-SCNC: 4 MMOL/L (ref 3.5–5.1)
POTASSIUM SERPL-SCNC: 4.1 MMOL/L
PROT SERPL-MCNC: 6.8 G/DL
PROTHROMBIN TIME: 11.8 SEC
RBC # BLD AUTO: 3.32 M/UL
SAMPLE: ABNORMAL
SODIUM BLD-SCNC: 141 MMOL/L (ref 136–145)
SODIUM SERPL-SCNC: 141 MMOL/L
TROPONIN I SERPL DL<=0.01 NG/ML-MCNC: 0.05 NG/ML
WBC # BLD AUTO: 4.4 K/UL

## 2017-09-25 PROCEDURE — 96366 THER/PROPH/DIAG IV INF ADDON: CPT

## 2017-09-25 PROCEDURE — 96375 TX/PRO/DX INJ NEW DRUG ADDON: CPT

## 2017-09-25 PROCEDURE — 86900 BLOOD TYPING SEROLOGIC ABO: CPT

## 2017-09-25 PROCEDURE — 25000003 PHARM REV CODE 250

## 2017-09-25 PROCEDURE — 85610 PROTHROMBIN TIME: CPT

## 2017-09-25 PROCEDURE — 93010 ELECTROCARDIOGRAM REPORT: CPT | Mod: ,,, | Performed by: INTERNAL MEDICINE

## 2017-09-25 PROCEDURE — 99285 EMERGENCY DEPT VISIT HI MDM: CPT | Mod: 25

## 2017-09-25 PROCEDURE — 93010 ELECTROCARDIOGRAM REPORT: CPT | Mod: 76,,, | Performed by: INTERNAL MEDICINE

## 2017-09-25 PROCEDURE — 86901 BLOOD TYPING SEROLOGIC RH(D): CPT

## 2017-09-25 PROCEDURE — 25000003 PHARM REV CODE 250: Performed by: INTERNAL MEDICINE

## 2017-09-25 PROCEDURE — 99285 EMERGENCY DEPT VISIT HI MDM: CPT | Mod: GC,,, | Performed by: EMERGENCY MEDICINE

## 2017-09-25 PROCEDURE — 99223 1ST HOSP IP/OBS HIGH 75: CPT | Mod: ,,, | Performed by: INTERNAL MEDICINE

## 2017-09-25 PROCEDURE — 83880 ASSAY OF NATRIURETIC PEPTIDE: CPT

## 2017-09-25 PROCEDURE — 63600175 PHARM REV CODE 636 W HCPCS: Performed by: EMERGENCY MEDICINE

## 2017-09-25 PROCEDURE — 84484 ASSAY OF TROPONIN QUANT: CPT

## 2017-09-25 PROCEDURE — 93005 ELECTROCARDIOGRAM TRACING: CPT

## 2017-09-25 PROCEDURE — 80053 COMPREHEN METABOLIC PANEL: CPT

## 2017-09-25 PROCEDURE — 85025 COMPLETE CBC W/AUTO DIFF WBC: CPT

## 2017-09-25 PROCEDURE — 96365 THER/PROPH/DIAG IV INF INIT: CPT

## 2017-09-25 RX ORDER — METOPROLOL TARTRATE 1 MG/ML
10 INJECTION, SOLUTION INTRAVENOUS ONCE
Status: COMPLETED | OUTPATIENT
Start: 2017-09-26 | End: 2017-09-25

## 2017-09-25 RX ORDER — FUROSEMIDE 10 MG/ML
80 INJECTION INTRAMUSCULAR; INTRAVENOUS ONCE
Status: COMPLETED | OUTPATIENT
Start: 2017-09-26 | End: 2017-09-25

## 2017-09-25 RX ORDER — MORPHINE SULFATE 4 MG/ML
4 INJECTION, SOLUTION INTRAMUSCULAR; INTRAVENOUS
Status: COMPLETED | OUTPATIENT
Start: 2017-09-25 | End: 2017-09-25

## 2017-09-25 RX ORDER — NITROGLYCERIN 20 MG/100ML
INJECTION INTRAVENOUS
Status: COMPLETED
Start: 2017-09-25 | End: 2017-09-25

## 2017-09-25 RX ORDER — HEPARIN SODIUM,PORCINE/D5W 25000/250
17 INTRAVENOUS SOLUTION INTRAVENOUS CONTINUOUS
Status: DISCONTINUED | OUTPATIENT
Start: 2017-09-26 | End: 2017-09-26

## 2017-09-25 RX ORDER — NITROGLYCERIN 20 MG/100ML
30 INJECTION INTRAVENOUS CONTINUOUS
Status: DISCONTINUED | OUTPATIENT
Start: 2017-09-25 | End: 2017-09-26

## 2017-09-25 RX ADMIN — NITROGLYCERIN 30 MCG/MIN: 20 INJECTION INTRAVENOUS at 09:09

## 2017-09-25 RX ADMIN — FUROSEMIDE 80 MG: 10 INJECTION, SOLUTION INTRAVENOUS at 11:09

## 2017-09-25 RX ADMIN — METOPROLOL TARTRATE 10 MG: 5 INJECTION INTRAVENOUS at 11:09

## 2017-09-25 RX ADMIN — MORPHINE SULFATE 4 MG: 4 INJECTION INTRAVENOUS at 10:09

## 2017-09-25 RX ADMIN — TICAGRELOR 180 MG: 90 TABLET ORAL at 09:09

## 2017-09-25 RX ADMIN — ALUMINUM HYDROXIDE, MAGNESIUM HYDROXIDE, AND SIMETHICONE 50 ML: 200; 200; 20 SUSPENSION ORAL at 11:09

## 2017-09-26 DIAGNOSIS — I21.4 NON-ST ELEVATION MYOCARDIAL INFARCTION (NSTEMI): Primary | ICD-10-CM

## 2017-09-26 PROBLEM — I50.22 CHRONIC SYSTOLIC HEART FAILURE: Status: ACTIVE | Noted: 2017-09-26

## 2017-09-26 LAB
ALBUMIN SERPL BCP-MCNC: 3 G/DL
ALP SERPL-CCNC: 62 U/L
ALT SERPL W/O P-5'-P-CCNC: 23 U/L
ANION GAP SERPL CALC-SCNC: 12 MMOL/L
AST SERPL-CCNC: 25 U/L
BASOPHILS # BLD AUTO: 0 K/UL
BASOPHILS # BLD AUTO: 0 K/UL
BASOPHILS NFR BLD: 0 %
BASOPHILS NFR BLD: 0 %
BILIRUB SERPL-MCNC: 0.4 MG/DL
BUN SERPL-MCNC: 66 MG/DL
CALCIUM SERPL-MCNC: 9.1 MG/DL
CHLORIDE SERPL-SCNC: 111 MMOL/L
CO2 SERPL-SCNC: 19 MMOL/L
CREAT SERPL-MCNC: 4.7 MG/DL
DIFFERENTIAL METHOD: ABNORMAL
DIFFERENTIAL METHOD: ABNORMAL
EOSINOPHIL # BLD AUTO: 0 K/UL
EOSINOPHIL # BLD AUTO: 0 K/UL
EOSINOPHIL NFR BLD: 0 %
EOSINOPHIL NFR BLD: 0 %
ERYTHROCYTE [DISTWIDTH] IN BLOOD BY AUTOMATED COUNT: 15.2 %
ERYTHROCYTE [DISTWIDTH] IN BLOOD BY AUTOMATED COUNT: 15.3 %
EST. GFR  (AFRICAN AMERICAN): 12.2 ML/MIN/1.73 M^2
EST. GFR  (NON AFRICAN AMERICAN): 10.5 ML/MIN/1.73 M^2
ESTIMATED PA SYSTOLIC PRESSURE: 59.36
FACT X PPP CHRO-ACNC: 0.61 IU/ML
FACT X PPP CHRO-ACNC: <0.1 IU/ML
GLUCOSE SERPL-MCNC: 167 MG/DL
HCT VFR BLD AUTO: 31.9 %
HCT VFR BLD AUTO: 33.1 %
HGB BLD-MCNC: 10 G/DL
HGB BLD-MCNC: 10.8 G/DL
LYMPHOCYTES # BLD AUTO: 0.9 K/UL
LYMPHOCYTES # BLD AUTO: 1.2 K/UL
LYMPHOCYTES NFR BLD: 11.1 %
LYMPHOCYTES NFR BLD: 14.4 %
MAGNESIUM SERPL-MCNC: 2 MG/DL
MCH RBC QN AUTO: 30.6 PG
MCH RBC QN AUTO: 30.6 PG
MCHC RBC AUTO-ENTMCNC: 31.3 G/DL
MCHC RBC AUTO-ENTMCNC: 32.6 G/DL
MCV RBC AUTO: 94 FL
MCV RBC AUTO: 98 FL
MITRAL VALVE REGURGITATION: ABNORMAL
MONOCYTES # BLD AUTO: 0.2 K/UL
MONOCYTES # BLD AUTO: 0.2 K/UL
MONOCYTES NFR BLD: 2.7 %
MONOCYTES NFR BLD: 2.8 %
NEUTROPHILS # BLD AUTO: 6.8 K/UL
NEUTROPHILS # BLD AUTO: 7 K/UL
NEUTROPHILS NFR BLD: 82.7 %
NEUTROPHILS NFR BLD: 86 %
PHOSPHATE SERPL-MCNC: 4.3 MG/DL
PLATELET # BLD AUTO: 187 K/UL
PLATELET # BLD AUTO: 194 K/UL
PMV BLD AUTO: 11.6 FL
PMV BLD AUTO: 11.7 FL
POTASSIUM SERPL-SCNC: 4.8 MMOL/L
PROT SERPL-MCNC: 6.2 G/DL
RBC # BLD AUTO: 3.27 M/UL
RBC # BLD AUTO: 3.53 M/UL
RETIRED EF AND QEF - SEE NOTES: 50 (ref 55–65)
SODIUM SERPL-SCNC: 142 MMOL/L
TRICUSPID VALVE REGURGITATION: ABNORMAL
TROPONIN I SERPL DL<=0.01 NG/ML-MCNC: 10.12 NG/ML
TROPONIN I SERPL DL<=0.01 NG/ML-MCNC: 10.36 NG/ML
TROPONIN I SERPL DL<=0.01 NG/ML-MCNC: 5.91 NG/ML
WBC # BLD AUTO: 7.85 K/UL
WBC # BLD AUTO: 8.42 K/UL

## 2017-09-26 PROCEDURE — 93010 ELECTROCARDIOGRAM REPORT: CPT | Mod: ,,, | Performed by: INTERNAL MEDICINE

## 2017-09-26 PROCEDURE — 85520 HEPARIN ASSAY: CPT | Mod: 91

## 2017-09-26 PROCEDURE — 93010 ELECTROCARDIOGRAM REPORT: CPT | Mod: 77,,, | Performed by: INTERNAL MEDICINE

## 2017-09-26 PROCEDURE — 93306 TTE W/DOPPLER COMPLETE: CPT | Mod: 26,,, | Performed by: INTERNAL MEDICINE

## 2017-09-26 PROCEDURE — 93005 ELECTROCARDIOGRAM TRACING: CPT

## 2017-09-26 PROCEDURE — 25000003 PHARM REV CODE 250: Performed by: STUDENT IN AN ORGANIZED HEALTH CARE EDUCATION/TRAINING PROGRAM

## 2017-09-26 PROCEDURE — 80053 COMPREHEN METABOLIC PANEL: CPT

## 2017-09-26 PROCEDURE — A4216 STERILE WATER/SALINE, 10 ML: HCPCS | Performed by: STUDENT IN AN ORGANIZED HEALTH CARE EDUCATION/TRAINING PROGRAM

## 2017-09-26 PROCEDURE — 25000003 PHARM REV CODE 250: Performed by: INTERNAL MEDICINE

## 2017-09-26 PROCEDURE — 99223 1ST HOSP IP/OBS HIGH 75: CPT | Mod: AI,GC,, | Performed by: INTERNAL MEDICINE

## 2017-09-26 PROCEDURE — 84484 ASSAY OF TROPONIN QUANT: CPT | Mod: 91

## 2017-09-26 PROCEDURE — 63600175 PHARM REV CODE 636 W HCPCS: Performed by: INTERNAL MEDICINE

## 2017-09-26 PROCEDURE — 83735 ASSAY OF MAGNESIUM: CPT

## 2017-09-26 PROCEDURE — 85025 COMPLETE CBC W/AUTO DIFF WBC: CPT

## 2017-09-26 PROCEDURE — 36415 COLL VENOUS BLD VENIPUNCTURE: CPT

## 2017-09-26 PROCEDURE — 84100 ASSAY OF PHOSPHORUS: CPT

## 2017-09-26 PROCEDURE — 20600001 HC STEP DOWN PRIVATE ROOM

## 2017-09-26 PROCEDURE — 93306 TTE W/DOPPLER COMPLETE: CPT

## 2017-09-26 PROCEDURE — 84484 ASSAY OF TROPONIN QUANT: CPT

## 2017-09-26 RX ORDER — OMEGA-3-ACID ETHYL ESTERS 1 G/1
1 CAPSULE, LIQUID FILLED ORAL 2 TIMES DAILY
Status: DISCONTINUED | OUTPATIENT
Start: 2017-09-26 | End: 2017-09-30 | Stop reason: HOSPADM

## 2017-09-26 RX ORDER — ALLOPURINOL 100 MG/1
100 TABLET ORAL DAILY
Status: DISCONTINUED | OUTPATIENT
Start: 2017-09-26 | End: 2017-09-30 | Stop reason: HOSPADM

## 2017-09-26 RX ORDER — TAMSULOSIN HYDROCHLORIDE 0.4 MG/1
0.4 CAPSULE ORAL DAILY
Status: DISCONTINUED | OUTPATIENT
Start: 2017-09-26 | End: 2017-09-30 | Stop reason: HOSPADM

## 2017-09-26 RX ORDER — CALCITRIOL 0.25 UG/1
0.25 CAPSULE ORAL
Status: DISCONTINUED | OUTPATIENT
Start: 2017-09-27 | End: 2017-09-30 | Stop reason: HOSPADM

## 2017-09-26 RX ORDER — FINASTERIDE 5 MG/1
5 TABLET, FILM COATED ORAL DAILY
Status: DISCONTINUED | OUTPATIENT
Start: 2017-09-26 | End: 2017-09-30 | Stop reason: HOSPADM

## 2017-09-26 RX ORDER — SODIUM BICARBONATE 650 MG/1
650 TABLET ORAL DAILY
Status: DISCONTINUED | OUTPATIENT
Start: 2017-09-26 | End: 2017-09-30 | Stop reason: HOSPADM

## 2017-09-26 RX ORDER — LEVOTHYROXINE SODIUM 25 UG/1
25 TABLET ORAL DAILY
Status: DISCONTINUED | OUTPATIENT
Start: 2017-09-26 | End: 2017-09-30 | Stop reason: HOSPADM

## 2017-09-26 RX ORDER — PANTOPRAZOLE SODIUM 40 MG/1
40 TABLET, DELAYED RELEASE ORAL DAILY
Status: DISCONTINUED | OUTPATIENT
Start: 2017-09-26 | End: 2017-09-30 | Stop reason: HOSPADM

## 2017-09-26 RX ORDER — SEVELAMER CARBONATE 800 MG/1
1600 TABLET, FILM COATED ORAL
Status: DISCONTINUED | OUTPATIENT
Start: 2017-09-26 | End: 2017-09-30 | Stop reason: HOSPADM

## 2017-09-26 RX ORDER — ROSUVASTATIN CALCIUM 10 MG/1
40 TABLET, COATED ORAL
Status: DISCONTINUED | OUTPATIENT
Start: 2017-09-27 | End: 2017-09-26

## 2017-09-26 RX ORDER — METOPROLOL TARTRATE 25 MG/1
12.5 TABLET ORAL 2 TIMES DAILY
Status: DISCONTINUED | OUTPATIENT
Start: 2017-09-26 | End: 2017-09-26

## 2017-09-26 RX ORDER — HYDROMORPHONE HYDROCHLORIDE 1 MG/ML
0.5 INJECTION, SOLUTION INTRAMUSCULAR; INTRAVENOUS; SUBCUTANEOUS EVERY 4 HOURS PRN
Status: DISCONTINUED | OUTPATIENT
Start: 2017-09-26 | End: 2017-09-29

## 2017-09-26 RX ORDER — METOPROLOL SUCCINATE 25 MG/1
50 TABLET, EXTENDED RELEASE ORAL DAILY
Status: DISCONTINUED | OUTPATIENT
Start: 2017-09-26 | End: 2017-09-28

## 2017-09-26 RX ORDER — HEPARIN SODIUM,PORCINE/D5W 25000/250
13 INTRAVENOUS SOLUTION INTRAVENOUS CONTINUOUS
Status: DISCONTINUED | OUTPATIENT
Start: 2017-09-26 | End: 2017-09-28

## 2017-09-26 RX ORDER — SODIUM CHLORIDE 0.9 % (FLUSH) 0.9 %
3 SYRINGE (ML) INJECTION EVERY 8 HOURS
Status: DISCONTINUED | OUTPATIENT
Start: 2017-09-26 | End: 2017-09-30 | Stop reason: HOSPADM

## 2017-09-26 RX ORDER — ROSUVASTATIN CALCIUM 10 MG/1
40 TABLET, COATED ORAL DAILY
Status: DISCONTINUED | OUTPATIENT
Start: 2017-09-26 | End: 2017-09-26

## 2017-09-26 RX ORDER — METOPROLOL TARTRATE 25 MG/1
25 TABLET, FILM COATED ORAL 2 TIMES DAILY
Status: DISCONTINUED | OUTPATIENT
Start: 2017-09-26 | End: 2017-09-26

## 2017-09-26 RX ORDER — ISOSORBIDE MONONITRATE 30 MG/1
120 TABLET, EXTENDED RELEASE ORAL DAILY
Status: DISCONTINUED | OUTPATIENT
Start: 2017-09-26 | End: 2017-09-30 | Stop reason: HOSPADM

## 2017-09-26 RX ORDER — NITROGLYCERIN 0.4 MG/1
0.4 TABLET SUBLINGUAL EVERY 5 MIN PRN
Status: DISCONTINUED | OUTPATIENT
Start: 2017-09-26 | End: 2017-09-30 | Stop reason: HOSPADM

## 2017-09-26 RX ORDER — ALPRAZOLAM 0.25 MG/1
0.25 TABLET ORAL ONCE
Status: COMPLETED | OUTPATIENT
Start: 2017-09-26 | End: 2017-09-26

## 2017-09-26 RX ORDER — ROSUVASTATIN CALCIUM 5 MG/1
10 TABLET, COATED ORAL DAILY
Status: DISCONTINUED | OUTPATIENT
Start: 2017-09-26 | End: 2017-09-30 | Stop reason: HOSPADM

## 2017-09-26 RX ORDER — NAPROXEN SODIUM 220 MG/1
81 TABLET, FILM COATED ORAL DAILY
Status: DISCONTINUED | OUTPATIENT
Start: 2017-09-26 | End: 2017-09-30 | Stop reason: HOSPADM

## 2017-09-26 RX ADMIN — SEVELAMER CARBONATE 1600 MG: 800 TABLET, FILM COATED ORAL at 01:09

## 2017-09-26 RX ADMIN — TICAGRELOR 90 MG: 90 TABLET ORAL at 10:09

## 2017-09-26 RX ADMIN — ASPIRIN 81 MG CHEWABLE TABLET 81 MG: 81 TABLET CHEWABLE at 09:09

## 2017-09-26 RX ADMIN — TICAGRELOR 90 MG: 90 TABLET ORAL at 09:09

## 2017-09-26 RX ADMIN — FINASTERIDE 5 MG: 5 TABLET, FILM COATED ORAL at 09:09

## 2017-09-26 RX ADMIN — OMEGA-3-ACID ETHYL ESTERS 1 G: 1 CAPSULE, LIQUID FILLED ORAL at 02:09

## 2017-09-26 RX ADMIN — SODIUM BICARBONATE 650 MG TABLET 650 MG: at 09:09

## 2017-09-26 RX ADMIN — OMEGA-3-ACID ETHYL ESTERS 1 G: 1 CAPSULE, LIQUID FILLED ORAL at 09:09

## 2017-09-26 RX ADMIN — PANTOPRAZOLE SODIUM 40 MG: 40 TABLET, DELAYED RELEASE ORAL at 09:09

## 2017-09-26 RX ADMIN — METOPROLOL SUCCINATE 50 MG: 25 TABLET, EXTENDED RELEASE ORAL at 09:09

## 2017-09-26 RX ADMIN — HYDRALAZINE HYDROCHLORIDE 75 MG: 50 TABLET ORAL at 01:09

## 2017-09-26 RX ADMIN — HEPARIN SODIUM AND DEXTROSE 13 UNITS/KG/HR: 10000; 5 INJECTION INTRAVENOUS at 09:09

## 2017-09-26 RX ADMIN — ISOSORBIDE MONONITRATE 120 MG: 30 TABLET, EXTENDED RELEASE ORAL at 09:09

## 2017-09-26 RX ADMIN — HYDRALAZINE HYDROCHLORIDE 75 MG: 50 TABLET ORAL at 05:09

## 2017-09-26 RX ADMIN — HYDRALAZINE HYDROCHLORIDE 75 MG: 50 TABLET ORAL at 09:09

## 2017-09-26 RX ADMIN — SEVELAMER CARBONATE 1600 MG: 800 TABLET, FILM COATED ORAL at 09:09

## 2017-09-26 RX ADMIN — ALPRAZOLAM 0.25 MG: 0.25 TABLET ORAL at 09:09

## 2017-09-26 RX ADMIN — ALLOPURINOL 100 MG: 100 TABLET ORAL at 09:09

## 2017-09-26 RX ADMIN — ROSUVASTATIN CALCIUM 10 MG: 5 TABLET ORAL at 10:09

## 2017-09-26 RX ADMIN — SODIUM FERRIC GLUCONATE COMPLEX 125 MG: 12.5 INJECTION INTRAVENOUS at 05:09

## 2017-09-26 RX ADMIN — Medication 3 ML: at 10:09

## 2017-09-26 RX ADMIN — LEVOTHYROXINE SODIUM 25 MCG: 25 TABLET ORAL at 05:09

## 2017-09-26 RX ADMIN — TAMSULOSIN HYDROCHLORIDE 0.4 MG: 0.4 CAPSULE ORAL at 09:09

## 2017-09-26 NOTE — ED NOTES
Patient identifiers verified and correct for Jose Maria JrJuan C.    LOC: The patient is awake, alert and aware of environment with an appropriate affect, the patient is oriented x 3 and speaking appropriately.  APPEARANCE: Patient resting uncomfortably and in mild acute distress, patient is clean and well groomed, patient's clothing is properly fastened.  SKIN: The skin is warm and dry, color consistent with ethnicity, patient has normal skin turgor and moist mucus membranes, skin intact, no breakdown or bruising noted.  MUSCULOSKELETAL: Patient moving all extremities spontaneously, no obvious swelling or deformities noted.  RESPIRATORY: Airway is open and patent, respirations are spontaneous, patient has an increased effort and rate of 24, no accessory muscle use noted, bilateral breath sounds even and clear.  CARDIAC: Patient has a normal rate and irregular rhythm, no periphreal edema noted, capillary refill < 3 seconds.  ABDOMEN: Soft and non tender to palpation, no distention noted, normoactive bowel sounds present in all four quadrants.  NEUROLOGIC: Pupils equal bilaterally, eyes open spontaneously, behavior appropriate to situation, follows commands, facial expression symmetrical, bilateral hand grasp equal and even, purposeful motor response noted, normal sensation in all extremities when touched with a finger.

## 2017-09-26 NOTE — PLAN OF CARE
Patient with 2nd troponin 5.9, consistent with NSTEMI.  Would treat medically with dual antiplatelets and heparin gtt for at least 48 hours.  Patient with recent transfusion but no active bleed, H/H stable overnight.  He will need TTE (would order with color flow), beta blocker.  Trend troponin to peak.  Would repeat EKG now that he is chest pain free to evaluate baseline EKG, as the last 2 in our system are similar in appearance but in the setting of ischemia, unclear if these are baseline or changes from baseline.  He does not want angiogram at this time due to likelihood of being on dialysis, this is consistent with previous wishes with his cardiologist, Dr. Gorman.  If he's having more chest pain, he will likely need angiogram.  He should follow with Dr. Gorman either way, he may need angiogram as outpatient given these recurrent episodes.     It is reasonable to transfer to team C/J if desired by the primary team.     Josiah Blakely MD  Cardiology Fellow

## 2017-09-26 NOTE — CONSULTS
"Cardiac Rehab     Jose Maria Jr.   [id number]   9/26/2017       Activity taught: Yes    Cardiac Rehab Phase Taught: Phase 1 & 2    Risk Factors-Modifiable: hyperlipidemia, hypertension, exercise    Risk Factors-Non modifiable: age, gender    Teaching Method: Verbal, Written, Living with Heart Disease Booklet    Understanding: Yes    Response: Patient and wife verbalized understanding and questions answered. Spoke with patient in the [facility] area as he was not in his room. Said he thinks he might want to go if it will help.     Comments: S/P NSTEMI. Discussed cardiac rehab and risk factor modification. Team to refer patient to [facility] Cardiac Rehab Phase II. Educational materials were used in the process and given to the patient. They included "Your Guide to Living with Heart Disease", Phase Two Cardiac Rehabilitation information along with a sample Mediterranean diet.The patient expressed understanding of the teaching and expressed desire to take a role in modifying the risk factors when they return home.        "

## 2017-09-26 NOTE — ASSESSMENT & PLAN NOTE
- Creatinine 4.7 today. Up from baseline of 3.9  - Could represent gradual worsening of CKD as patient refuses dialysis or 2/2 cardiorenal syndrome given NSTEMI and worsening CHF.   - Continue medical management of HTN, CHF, and NSTEMI.  - Sevelamer 1600 mg PO QD with meals.  - Sodium bicarb 650 mg PO QD.   - Edd continue to monitor renal function.

## 2017-09-26 NOTE — SUBJECTIVE & OBJECTIVE
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      Current Outpatient Prescriptions on File Prior to Encounter   Medication Sig    ACCU-CHEK TANIKA PLUS TEST STRP Strp once daily.     allopurinol (ZYLOPRIM) 100 MG tablet Take 1 tablet (100 mg total) by mouth once daily.    alprazolam (XANAX) 0.25 MG tablet Take 1 tablet (0.25 mg total) by mouth nightly as needed for Anxiety.    aspirin 81 MG Chew Take 81 mg by mouth once daily.    calcitRIOL (ROCALTROL) 0.25 MCG Cap Take 1 capsule (0.25 mcg total) by mouth every Mon, Wed, Fri.    cyanocobalamin 500 mcg tablet Take 2 tablets (1,000 mcg total) by mouth once daily.    finasteride (PROSCAR) 5 mg tablet Take 5 mg by mouth once daily.    furosemide (LASIX) 40 MG tablet Take 1 tablet (40 mg total) by mouth once daily.    hydrALAZINE (APRESOLINE) 25 MG tablet Take 3 tablets (75 mg total) by mouth 3 (three) times daily.    isosorbide mononitrate (IMDUR) 120 MG 24 hr tablet Take 1 tablet (120 mg total) by mouth once daily.    levothyroxine (SYNTHROID) 25 MCG tablet Take 1 tablet (25 mcg total) by mouth once daily.    metoprolol tartrate (LOPRESSOR) 25 MG tablet Take 0.5 tablets (12.5 mg total) by mouth 2 (two) times daily.    multivitamin capsule Take 1 capsule by mouth once daily.    nitroGLYCERIN (NITROSTAT) 0.4 MG SL tablet Place 1 tablet (0.4 mg total)  under the tongue every 5 (five) minutes as needed for Chest pain.    omega-3 acid ethyl esters (LOVAZA) 1 gram capsule Take 1 capsule (1 g total) by mouth 2 (two) times daily.    omeprazole (PRILOSEC) 20 MG capsule Take 1 capsule (20 mg total) by mouth once daily.    rosuvastatin (CRESTOR) 40 MG Tab Take 1 tablet (40 mg total) by mouth every Mon, Wed, Fri.    sevelamer HCl (RENAGEL) 800 MG Tab Take 2 tablets (1,600 mg total) by mouth 3 (three) times daily with meals.    sodium bicarbonate 650 MG tablet Take 1 tablet (650 mg total) by mouth once daily.    tamsulosin (FLOMAX) 0.4 mg Cp24 Take 1 capsule (0.4 mg total) by mouth once daily.     Family History     None        Social History Main Topics    Smoking status: Never Smoker    Smokeless tobacco: Never Used    Alcohol use No    Drug use: No    Sexual activity: Not Currently     Review of Systems   Constitutional: Negative for chills, diaphoresis and fever.   HENT: Negative for congestion and sore throat.    Eyes: Negative for photophobia and visual disturbance.   Respiratory: Positive for cough, chest tightness and shortness of breath.    Cardiovascular: Negative for chest pain and palpitations.   Gastrointestinal: Negative for abdominal pain, diarrhea, nausea and vomiting.   Genitourinary: Negative for dysuria and hematuria.   Musculoskeletal: Negative for arthralgias and myalgias.   Skin: Negative for color change and rash.   Neurological: Negative for dizziness, light-headedness and headaches.   Psychiatric/Behavioral: Negative for agitation and confusion.     Objective:     Vital Signs (Most Recent):  Temp: 97.8 °F (36.6 °C) (09/26/17 0724)  Pulse: 68 (09/26/17 0724)  Resp: 16 (09/26/17 0724)  BP: 124/66 (09/26/17 0724)  SpO2: (!) 93 % (09/26/17 0724) Vital Signs (24h Range):  Temp:  [97.7 °F (36.5 °C)-97.8 °F (36.6 °C)] 97.8 °F (36.6 °C)  Pulse:  [62-97] 68  Resp:  [12-24] 16  SpO2:  [91 %-97 %] 93 %  BP: (113-149)/(59-79) 124/66     Weight: 73.3  kg (161 lb 9.6 oz)  Body mass index is 23.86 kg/m².    Physical Exam   Constitutional: He is oriented to person, place, and time. He appears well-developed and well-nourished. No distress.   HENT:   Head: Normocephalic and atraumatic.   Mouth/Throat: Oropharynx is clear and moist.   Eyes: EOM are normal. Pupils are equal, round, and reactive to light.   Neck: Neck supple. No JVD present.   Cardiovascular: Normal rate, regular rhythm and intact distal pulses.  Exam reveals no gallop and no friction rub.    Murmur heard.  Pulmonary/Chest: Effort normal and breath sounds normal. No respiratory distress. He has no wheezes. He has no rales.   Abdominal: Soft. Bowel sounds are normal. He exhibits no distension. There is no tenderness. There is no guarding.   Musculoskeletal: Normal range of motion.   Bilateral lower extremity 2+ pitting edema up to shins.    Neurological: He is alert and oriented to person, place, and time.   Skin: Skin is warm and dry. Capillary refill takes less than 2 seconds. He is not diaphoretic.        Significant Labs:   Recent Results (from the past 24 hour(s))   ISTAT PROCEDURE    Collection Time: 09/25/17  9:32 PM   Result Value Ref Range    POC Glucose 249 (H) 70 - 110 mg/dL    POC BUN 64 (H) 6 - 30 mg/dL    POC Creatinine 4.6 (H) 0.5 - 1.4 mg/dL    POC Sodium 141 136 - 145 mmol/L    POC Potassium 4.0 3.5 - 5.1 mmol/L    POC Chloride 109 95 - 110 mmol/L    POC TCO2 (MEASURED) 18 (L) 23 - 29 mmol/L    POC Ionized Calcium 1.19 1.06 - 1.42 mmol/L    POC Hematocrit 30 (L) 36 - 54 %PCV    Sample JOSEPHINE    CBC auto differential    Collection Time: 09/25/17  9:35 PM   Result Value Ref Range    WBC 4.40 3.90 - 12.70 K/uL    RBC 3.32 (L) 4.60 - 6.20 M/uL    Hemoglobin 10.0 (L) 14.0 - 18.0 g/dL    Hematocrit 31.7 (L) 40.0 - 54.0 %    MCV 96 82 - 98 fL    MCH 30.1 27.0 - 31.0 pg    MCHC 31.5 (L) 32.0 - 36.0 g/dL    RDW 15.0 (H) 11.5 - 14.5 %    Platelets 212 150 - 350 K/uL    MPV 11.7 9.2 - 12.9 fL    Gran  # 3.8 1.8 - 7.7 K/uL    Lymph # 0.6 (L) 1.0 - 4.8 K/uL    Mono # 0.0 (L) 0.3 - 1.0 K/uL    Eos # 0.0 0.0 - 0.5 K/uL    Baso # 0.01 0.00 - 0.20 K/uL    Gran% 85.9 (H) 38.0 - 73.0 %    Lymph% 13.0 (L) 18.0 - 48.0 %    Mono% 0.9 (L) 4.0 - 15.0 %    Eosinophil% 0.0 0.0 - 8.0 %    Basophil% 0.2 0.0 - 1.9 %    Differential Method Automated    Comprehensive metabolic panel    Collection Time: 09/25/17  9:35 PM   Result Value Ref Range    Sodium 141 136 - 145 mmol/L    Potassium 4.1 3.5 - 5.1 mmol/L    Chloride 110 95 - 110 mmol/L    CO2 18 (L) 23 - 29 mmol/L    Glucose 263 (H) 70 - 110 mg/dL    BUN, Bld 64 (H) 8 - 23 mg/dL    Creatinine 4.6 (H) 0.5 - 1.4 mg/dL    Calcium 9.5 8.7 - 10.5 mg/dL    Total Protein 6.8 6.0 - 8.4 g/dL    Albumin 3.3 (L) 3.5 - 5.2 g/dL    Total Bilirubin 0.4 0.1 - 1.0 mg/dL    Alkaline Phosphatase 74 55 - 135 U/L    AST 13 10 - 40 U/L    ALT 26 10 - 44 U/L    Anion Gap 13 8 - 16 mmol/L    eGFR if African American 12.5 (A) >60 mL/min/1.73 m^2    eGFR if non African American 10.8 (A) >60 mL/min/1.73 m^2   Protime-INR    Collection Time: 09/25/17  9:35 PM   Result Value Ref Range    Prothrombin Time 11.8 9.0 - 12.5 sec    INR 1.1 0.8 - 1.2   Troponin I    Collection Time: 09/25/17  9:35 PM   Result Value Ref Range    Troponin I 0.049 (H) 0.000 - 0.026 ng/mL   Type & Screen    Collection Time: 09/25/17  9:35 PM   Result Value Ref Range    Group & Rh AB NEG     Indirect Shorty NEG    Brain natriuretic peptide    Collection Time: 09/25/17  9:35 PM   Result Value Ref Range    BNP 3,051 (H) 0 - 99 pg/mL   CBC auto differential    Collection Time: 09/26/17  6:23 AM   Result Value Ref Range    WBC 7.85 3.90 - 12.70 K/uL    RBC 3.27 (L) 4.60 - 6.20 M/uL    Hemoglobin 10.0 (L) 14.0 - 18.0 g/dL    Hematocrit 31.9 (L) 40.0 - 54.0 %    MCV 98 82 - 98 fL    MCH 30.6 27.0 - 31.0 pg    MCHC 31.3 (L) 32.0 - 36.0 g/dL    RDW 15.2 (H) 11.5 - 14.5 %    Platelets 187 150 - 350 K/uL    MPV 11.7 9.2 - 12.9 fL    Gran # 6.8  1.8 - 7.7 K/uL    Lymph # 0.9 (L) 1.0 - 4.8 K/uL    Mono # 0.2 (L) 0.3 - 1.0 K/uL    Eos # 0.0 0.0 - 0.5 K/uL    Baso # 0.00 0.00 - 0.20 K/uL    Gran% 86.0 (H) 38.0 - 73.0 %    Lymph% 11.1 (L) 18.0 - 48.0 %    Mono% 2.8 (L) 4.0 - 15.0 %    Eosinophil% 0.0 0.0 - 8.0 %    Basophil% 0.0 0.0 - 1.9 %    Differential Method Automated    Comprehensive metabolic panel    Collection Time: 09/26/17  6:23 AM   Result Value Ref Range    Sodium 142 136 - 145 mmol/L    Potassium 4.8 3.5 - 5.1 mmol/L    Chloride 111 (H) 95 - 110 mmol/L    CO2 19 (L) 23 - 29 mmol/L    Glucose 167 (H) 70 - 110 mg/dL    BUN, Bld 66 (H) 8 - 23 mg/dL    Creatinine 4.7 (H) 0.5 - 1.4 mg/dL    Calcium 9.1 8.7 - 10.5 mg/dL    Total Protein 6.2 6.0 - 8.4 g/dL    Albumin 3.0 (L) 3.5 - 5.2 g/dL    Total Bilirubin 0.4 0.1 - 1.0 mg/dL    Alkaline Phosphatase 62 55 - 135 U/L    AST 25 10 - 40 U/L    ALT 23 10 - 44 U/L    Anion Gap 12 8 - 16 mmol/L    eGFR if African American 12.2 (A) >60 mL/min/1.73 m^2    eGFR if non African American 10.5 (A) >60 mL/min/1.73 m^2   Magnesium    Collection Time: 09/26/17  6:23 AM   Result Value Ref Range    Magnesium 2.0 1.6 - 2.6 mg/dL   Phosphorus    Collection Time: 09/26/17  6:23 AM   Result Value Ref Range    Phosphorus 4.3 2.7 - 4.5 mg/dL   Troponin I    Collection Time: 09/26/17  6:23 AM   Result Value Ref Range    Troponin I 5.914 (H) 0.000 - 0.026 ng/mL   Anti-Xa Heparin Monitoring    Collection Time: 09/26/17  8:40 AM   Result Value Ref Range    Heparin Anti-Xa <0.10 (L) 0.30 - 0.70 IU/mL   CBC auto differential    Collection Time: 09/26/17  8:40 AM   Result Value Ref Range    WBC 8.42 3.90 - 12.70 K/uL    RBC 3.53 (L) 4.60 - 6.20 M/uL    Hemoglobin 10.8 (L) 14.0 - 18.0 g/dL    Hematocrit 33.1 (L) 40.0 - 54.0 %    MCV 94 82 - 98 fL    MCH 30.6 27.0 - 31.0 pg    MCHC 32.6 32.0 - 36.0 g/dL    RDW 15.3 (H) 11.5 - 14.5 %    Platelets 194 150 - 350 K/uL    MPV 11.6 9.2 - 12.9 fL    Gran # 7.0 1.8 - 7.7 K/uL    Lymph # 1.2  1.0 - 4.8 K/uL    Mono # 0.2 (L) 0.3 - 1.0 K/uL    Eos # 0.0 0.0 - 0.5 K/uL    Baso # 0.00 0.00 - 0.20 K/uL    Gran% 82.7 (H) 38.0 - 73.0 %    Lymph% 14.4 (L) 18.0 - 48.0 %    Mono% 2.7 (L) 4.0 - 15.0 %    Eosinophil% 0.0 0.0 - 8.0 %    Basophil% 0.0 0.0 - 1.9 %    Differential Method Automated          Significant Imaging:  EKG 9/25: ST depression and T wave inversion in inferior leads.   EKG 9/26: resolution of ST and T wave changes. 1st degree AV block.  Chest x-ray 9/25: Stable cardiomegaly with mild bilateral pulmonary edema.  2d Echo pending.

## 2017-09-26 NOTE — CONSULTS
Cardiology Initial Consult Note    9/25/2017    Reason for Consult: Chest pain  Primary Cardiologist: Dr. Gorman    SUBJECTIVE  Jose Maria Jr. is a 85 y.o. male with a history significant for CAD s/p CABG (SVG-LAD, SVG-PDA, SVG-OM1), PCI to SVG-OM1 (2015), ICM, CKD (baseline Cr 4), HTN, HLD.  He presents with chest pain starting this morning around 10 AM.  He says that the pain started this morning and felt like worsening indigestion with pressure that built throughout the day.  He reports that he has had this pain before, last time about a year ago when he got a stent.  He was admitted recently and treated by Dr. Gorman for NSTEMI, treated medically because of his renal function.    ?  Review of Systems   Constitution: Negative for chills, fever  HENT: Negative.    Eyes: Negative.    Cardiovascular: As per HPI.   Respiratory: Positive for shortness of breath. Positive for cough.    Endocrine: Negative.    Hematologic/Lymphatic: Negative.    Skin: Negative for color change and rash.   Musculoskeletal: Negative.    Gastrointestinal: Negative for abdominal pain, change in bowel habit  Genitourinary: Negative.    Neurological: Negative for dizziness, light-headedness  Psychiatric/Behavioral: Negative for altered mental status.     OBJECTIVE  Current Facility-Administered Medications   Medication Dose Route Frequency Provider Last Rate Last Dose    epoetin edwin injection 20,000 Units  20,000 Units Subcutaneous Q14 Days Jayleen Fung MD   20,000 Units at 04/27/17 1146    nitroGLYCERIN 50 mg in dextrose 5 % 250 mL infusion  30 mcg/min Intravenous Continuous Josiah Blakely MD 15 mL/hr at 09/25/17 2145 50 mcg/min at 09/25/17 2145    ticagrelor (BRILINTA) 90 mg tablet              Current Outpatient Prescriptions   Medication Sig Dispense Refill    ACCU-CHEK TANIKA PLUS TEST STRP Strp once daily.       allopurinol (ZYLOPRIM) 100 MG tablet Take 1 tablet (100 mg total) by mouth once daily. 30 tablet 0     alprazolam (XANAX) 0.25 MG tablet Take 1 tablet (0.25 mg total) by mouth nightly as needed for Anxiety. 60 tablet 0    aspirin 81 MG Chew Take 81 mg by mouth once daily.      calcitRIOL (ROCALTROL) 0.25 MCG Cap Take 1 capsule (0.25 mcg total) by mouth every Mon, Wed, Fri. 12 capsule 11    cyanocobalamin 500 mcg tablet Take 2 tablets (1,000 mcg total) by mouth once daily.      finasteride (PROSCAR) 5 mg tablet Take 5 mg by mouth once daily.      furosemide (LASIX) 40 MG tablet Take 1 tablet (40 mg total) by mouth once daily. 30 tablet 11    hydrALAZINE (APRESOLINE) 25 MG tablet Take 3 tablets (75 mg total) by mouth 3 (three) times daily. 270 tablet 11    isosorbide mononitrate (IMDUR) 120 MG 24 hr tablet Take 1 tablet (120 mg total) by mouth once daily. 30 tablet 11    levothyroxine (SYNTHROID) 25 MCG tablet Take 1 tablet (25 mcg total) by mouth once daily. 90 tablet 3    metoprolol tartrate (LOPRESSOR) 25 MG tablet Take 0.5 tablets (12.5 mg total) by mouth 2 (two) times daily. 30 tablet 11    multivitamin capsule Take 1 capsule by mouth once daily.      nitroGLYCERIN (NITROSTAT) 0.4 MG SL tablet Place 1 tablet (0.4 mg total) under the tongue every 5 (five) minutes as needed for Chest pain. 25 tablet 1    omega-3 acid ethyl esters (LOVAZA) 1 gram capsule Take 1 capsule (1 g total) by mouth 2 (two) times daily. 60 capsule 3    omeprazole (PRILOSEC) 20 MG capsule Take 1 capsule (20 mg total) by mouth once daily. 30 capsule 11    rosuvastatin (CRESTOR) 40 MG Tab Take 1 tablet (40 mg total) by mouth every Mon, Wed, Fri. 36 tablet 3    sevelamer HCl (RENAGEL) 800 MG Tab Take 2 tablets (1,600 mg total) by mouth 3 (three) times daily with meals. 180 tablet 11    sodium bicarbonate 650 MG tablet Take 1 tablet (650 mg total) by mouth once daily. 90 tablet 3    tamsulosin (FLOMAX) 0.4 mg Cp24 Take 1 capsule (0.4 mg total) by mouth once daily. 30 capsule 11       Past Medical History:   Diagnosis Date    Basal  cell carcinoma     BPH (benign prostatic hyperplasia)     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 4/7/2016    Coronary artery disease     Diabetes mellitus     Gout     High cholesterol     Hypertension     Persistent proteinuria 2/5/2017    Renal cyst 2/5/2017    Thyroid disease        Past Surgical History:   Procedure Laterality Date    aaa bypass      GALLBLADDER SURGERY      KNEE SURGERY         Review of patient's allergies indicates:   Allergen Reactions    Benadryl [diphenhydramine hcl]     Versed [midazolam]        No family history on file.    Social History     Social History    Marital status:      Spouse name: N/A    Number of children: N/A    Years of education: N/A     Social History Main Topics    Smoking status: Never Smoker    Smokeless tobacco: Never Used    Alcohol use No    Drug use: No    Sexual activity: Not Currently     Other Topics Concern    None     Social History Narrative    None       Physical Exam  Vitals: BP (!) 142/73   Pulse 90   SpO2 (!) 94%    Gen: Moderate distress, lying in bed, O2 via NC  Head/Eyes/Ears/Nose: NCAT, MMM   Neck: +JVD  Lung: CTAB, eupneic  Heart: Tachy rate and regular rhythm, II/VI systolic murmur, midline scar  Abdomen: Soft, NT, mild distention, NABS  Extremities: No LE edema bilaterally, R arm AV fistula with thrill   Skin: Normal color and turgor  Neuro: AAOx3    No results for input(s): NA, K, CO2, CL, BUN, CREATININE, GLU, CALCIUM, ALT, AST, ALKPHOS, BILITOT, PROT, ALBUMIN in the last 168 hours.    Invalid input(s): MAGNESIUM,  PHOSPHORUS    No results for input(s): WBC, HGB, HCT, PLT, MCV in the last 168 hours.  No results for input(s): APTT, INR, PTT in the last 168 hours.  No results for input(s): CPK, CPKMB, TROPONINI, MB in the last 168 hours.       EKG   Normal sinus rhythm, ST elevation aVR, ST depression inferolaterally  ?  ASSESSMENT AND PLAN  85 y.o. male with CAD s/p CABG (SVG-LAD, SVG-PDA, SVG-OM1), PCI to  SVG-OM1 (2015), ICM, CKD (baseline Cr 4), HTN, HLD.  He is regularly treated by Dr. Star Gorman, he apparently had stents placed in April of last year but unclear what his last anatomy was.  He presents with chest pain, described as pressure or indigestion sensation, similar to what he previously had when he had stents.  The pain started around 10 AM this morning, which is 12 hours prior to initial troponin, which is 0.04 on presentation to the ER.  This, in the setting of CKD, is not very impressive and may rule against this being ischemic pain.  He did recently have an MI with troponin up to 18, treated medically by his cardiologist due to CKD.  With chest pain this duration, this should be infarct if cardiac in nature.  His EKG is ischemic appearing, however last one in our system from 2016 looks similar.  Will treat medically, avoiding contrast dye which would lead to dialysis (this is c/w his prior wishes in Dr. Gorman' note as well).      If troponin flat, this may be non-ischemic chest pain.  CHF is certainly a consideration as well, as he does appear overloaded with CKD, and cough may be c/w CHF (noted to additionally have severe MR on last echo with EF 50%).  BNP is pending.  Review of last admit records indicate that GI cocktail helped him as well, so we will try that as well.     Recommendations:   - Continue nitro gtt for now, wean as tolerated  - Received morphine IV as well for pain, will give GI cocktail  - Trend troponin  - Given aspirin and ticagrelor in ER - No heparin right now as he is somewhat atypical for ischemia and recently required 2u pRBC for drop in H/H (unless trop uptrending, then would start heparin)  - Would give a dose of IV lasix 80mg, as he is somewhat overloaded and this may be contributing to discomfort  - Collateral from Dr. Gorman' records would be beneficial as well    Discussed with Cardiology Attending: Dr. Weathers and Dr. Agustin Blakely MD  Cardiology Fellow

## 2017-09-26 NOTE — ASSESSMENT & PLAN NOTE
- Patient received Epo injections every 14 days as an outpatient.  - Hgb currently stable at 10.8  - Will continue to monitor daily CBC.

## 2017-09-26 NOTE — H&P
[facility] Hospital Medicine  History & Physical    Patient Name: Jose Maria Jr.  MRN: [id number]  Admission Date: 9/25/2017  Attending Physician: Dylan Bedolla MD   Primary Care Provider: Lamar Felix MD    Hospital Medicine Team: [facility] 4 Michael Yanez MD     Patient information was obtained from patient, past medical records and ER records.     Subjective:     Principal Problem:NSTEMI (non-ST elevated myocardial infarction)    Chief Complaint:   Chief Complaint   Patient presents with    Chest Pain     arrived by helicopter; pt has been having chest pain since for several hours. pt given 3 sublingual nitro, 4mg morphine, 325mg ASA, 4mg zofran, and arrived with a tridil drip 20mcg/min    Shortness of Breath        HPI: Mr. Maria is a 84 y/o gentleman with PMHx  MI x 2 s/p CABG, DM, CKD4, HTN, HLD, Hypothyroidism, and gout who [place] [facility] [place] from [facility] for evaluation of NSTEMI. Yesterday morning, the patient started experiencing generalized chest and abdominal tightness 7/10 in severity.  It was exacerbated by exertion and relieved with rest, and was associated with SOB and a cough productive of clear sputum. He denied having diaphoresis or associated radiation of his chest tightness. He otherwise denies any fevers, chills, n/v/d, or dysuria.     Mr. Maria was recently admitted by Dr. Gorman for NSTEMI, and states his pain yesterday was similar to the pain he had on that admission. He was treated medically at that time due to his renal function. The patient does not want to be on dialysis, and deferred angiogram from cardiology on this admission due to the likelihood of him receiving dialysis s/p angiogram. The risk of death due to large myocardial infarctions was explained to Mr. Maria, however he still deferred angiogram and PCI and opted for medical management.           Past Medical History:   Diagnosis Date    Basal cell carcinoma     BPH  (benign prostatic hyperplasia)     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 4/7/2016    Coronary artery disease     Diabetes mellitus     Gout     High cholesterol     Hypertension     Persistent proteinuria 2/5/2017    Renal cyst 2/5/2017    Thyroid disease        Past Surgical History:   Procedure Laterality Date    aaa bypass      GALLBLADDER SURGERY      KNEE SURGERY         Review of patient's allergies indicates:   Allergen Reactions    Benadryl [diphenhydramine hcl]     Versed [midazolam]        No current facility-administered medications on file prior to encounter.      Current Outpatient Prescriptions on File Prior to Encounter   Medication Sig    ACCU-CHEK TANIKA PLUS TEST STRP Strp once daily.     allopurinol (ZYLOPRIM) 100 MG tablet Take 1 tablet (100 mg total) by mouth once daily.    alprazolam (XANAX) 0.25 MG tablet Take 1 tablet (0.25 mg total) by mouth nightly as needed for Anxiety.    aspirin 81 MG Chew Take 81 mg by mouth once daily.    calcitRIOL (ROCALTROL) 0.25 MCG Cap Take 1 capsule (0.25 mcg total) by mouth every Mon, Wed, Fri.    cyanocobalamin 500 mcg tablet Take 2 tablets (1,000 mcg total) by mouth once daily.    finasteride (PROSCAR) 5 mg tablet Take 5 mg by mouth once daily.    furosemide (LASIX) 40 MG tablet Take 1 tablet (40 mg total) by mouth once daily.    hydrALAZINE (APRESOLINE) 25 MG tablet Take 3 tablets (75 mg total) by mouth 3 (three) times daily.    isosorbide mononitrate (IMDUR) 120 MG 24 hr tablet Take 1 tablet (120 mg total) by mouth once daily.    levothyroxine (SYNTHROID) 25 MCG tablet Take 1 tablet (25 mcg total) by mouth once daily.    metoprolol tartrate (LOPRESSOR) 25 MG tablet Take 0.5 tablets (12.5 mg total) by mouth 2 (two) times daily.    multivitamin capsule Take 1 capsule by mouth once daily.    nitroGLYCERIN (NITROSTAT) 0.4 MG SL tablet Place 1 tablet (0.4 mg total) under the tongue every 5 (five) minutes as needed for Chest pain.     omega-3 acid ethyl esters (LOVAZA) 1 gram capsule Take 1 capsule (1 g total) by mouth 2 (two) times daily.    omeprazole (PRILOSEC) 20 MG capsule Take 1 capsule (20 mg total) by mouth once daily.    rosuvastatin (CRESTOR) 40 MG Tab Take 1 tablet (40 mg total) by mouth every Mon, Wed, Fri.    sevelamer HCl (RENAGEL) 800 MG Tab Take 2 tablets (1,600 mg total) by mouth 3 (three) times daily with meals.    sodium bicarbonate 650 MG tablet Take 1 tablet (650 mg total) by mouth once daily.    tamsulosin (FLOMAX) 0.4 mg Cp24 Take 1 capsule (0.4 mg total) by mouth once daily.     Family History     None        Social History Main Topics    Smoking status: Never Smoker    Smokeless tobacco: Never Used    Alcohol use No    Drug use: No    Sexual activity: Not Currently     Review of Systems   Constitutional: Negative for chills, diaphoresis and fever.   HENT: Negative for congestion and sore throat.    Eyes: Negative for photophobia and visual disturbance.   Respiratory: Positive for cough, chest tightness and shortness of breath.    Cardiovascular: Negative for chest pain and palpitations.   Gastrointestinal: Negative for abdominal pain, diarrhea, nausea and vomiting.   Genitourinary: Negative for dysuria and hematuria.   Musculoskeletal: Negative for arthralgias and myalgias.   Skin: Negative for color change and rash.   Neurological: Negative for dizziness, light-headedness and headaches.   Psychiatric/Behavioral: Negative for agitation and confusion.     Objective:     Vital Signs (Most Recent):  Temp: 97.8 °F (36.6 °C) (09/26/17 0724)  Pulse: 68 (09/26/17 0724)  Resp: 16 (09/26/17 0724)  BP: 124/66 (09/26/17 0724)  SpO2: (!) 93 % (09/26/17 0724) Vital Signs (24h Range):  Temp:  [97.7 °F (36.5 °C)-97.8 °F (36.6 °C)] 97.8 °F (36.6 °C)  Pulse:  [62-97] 68  Resp:  [12-24] 16  SpO2:  [91 %-97 %] 93 %  BP: (113-149)/(59-79) 124/66     Weight: 73.3 kg (161 lb 9.6 oz)  Body mass index is 23.86 kg/m².    Physical Exam    Constitutional: He is oriented to person, place, and time. He appears well-developed and well-nourished. No distress.   HENT:   Head: Normocephalic and atraumatic.   Mouth/Throat: Oropharynx is clear and moist.   Eyes: EOM are normal. Pupils are equal, round, and reactive to light.   Neck: Neck supple. No JVD present.   Cardiovascular: Normal rate, regular rhythm and intact distal pulses.  Exam reveals no gallop and no friction rub.    Murmur heard.  Pulmonary/Chest: Effort normal and breath sounds normal. No respiratory distress. He has no wheezes. He has no rales.   Abdominal: Soft. Bowel sounds are normal. He exhibits no distension. There is no tenderness. There is no guarding.   Musculoskeletal: Normal range of motion.   Bilateral lower extremity 2+ pitting edema up to shins.    Neurological: He is alert and oriented to person, place, and time.   Skin: Skin is warm and dry. Capillary refill takes less than 2 seconds. He is not diaphoretic.        Significant Labs:   Recent Results (from the past 24 hour(s))   ISTAT PROCEDURE    Collection Time: 09/25/17  9:32 PM   Result Value Ref Range    POC Glucose 249 (H) 70 - 110 mg/dL    POC BUN 64 (H) 6 - 30 mg/dL    POC Creatinine 4.6 (H) 0.5 - 1.4 mg/dL    POC Sodium 141 136 - 145 mmol/L    POC Potassium 4.0 3.5 - 5.1 mmol/L    POC Chloride 109 95 - 110 mmol/L    POC TCO2 (MEASURED) 18 (L) 23 - 29 mmol/L    POC Ionized Calcium 1.19 1.06 - 1.42 mmol/L    POC Hematocrit 30 (L) 36 - 54 %PCV    Sample JOSEPHINE    CBC auto differential    Collection Time: 09/25/17  9:35 PM   Result Value Ref Range    WBC 4.40 3.90 - 12.70 K/uL    RBC 3.32 (L) 4.60 - 6.20 M/uL    Hemoglobin 10.0 (L) 14.0 - 18.0 g/dL    Hematocrit 31.7 (L) 40.0 - 54.0 %    MCV 96 82 - 98 fL    MCH 30.1 27.0 - 31.0 pg    MCHC 31.5 (L) 32.0 - 36.0 g/dL    RDW 15.0 (H) 11.5 - 14.5 %    Platelets 212 150 - 350 K/uL    MPV 11.7 9.2 - 12.9 fL    Gran # 3.8 1.8 - 7.7 K/uL    Lymph # 0.6 (L) 1.0 - 4.8 K/uL    Mono # 0.0  (L) 0.3 - 1.0 K/uL    Eos # 0.0 0.0 - 0.5 K/uL    Baso # 0.01 0.00 - 0.20 K/uL    Gran% 85.9 (H) 38.0 - 73.0 %    Lymph% 13.0 (L) 18.0 - 48.0 %    Mono% 0.9 (L) 4.0 - 15.0 %    Eosinophil% 0.0 0.0 - 8.0 %    Basophil% 0.2 0.0 - 1.9 %    Differential Method Automated    Comprehensive metabolic panel    Collection Time: 09/25/17  9:35 PM   Result Value Ref Range    Sodium 141 136 - 145 mmol/L    Potassium 4.1 3.5 - 5.1 mmol/L    Chloride 110 95 - 110 mmol/L    CO2 18 (L) 23 - 29 mmol/L    Glucose 263 (H) 70 - 110 mg/dL    BUN, Bld 64 (H) 8 - 23 mg/dL    Creatinine 4.6 (H) 0.5 - 1.4 mg/dL    Calcium 9.5 8.7 - 10.5 mg/dL    Total Protein 6.8 6.0 - 8.4 g/dL    Albumin 3.3 (L) 3.5 - 5.2 g/dL    Total Bilirubin 0.4 0.1 - 1.0 mg/dL    Alkaline Phosphatase 74 55 - 135 U/L    AST 13 10 - 40 U/L    ALT 26 10 - 44 U/L    Anion Gap 13 8 - 16 mmol/L    eGFR if African American 12.5 (A) >60 mL/min/1.73 m^2    eGFR if non African American 10.8 (A) >60 mL/min/1.73 m^2   Protime-INR    Collection Time: 09/25/17  9:35 PM   Result Value Ref Range    Prothrombin Time 11.8 9.0 - 12.5 sec    INR 1.1 0.8 - 1.2   Troponin I    Collection Time: 09/25/17  9:35 PM   Result Value Ref Range    Troponin I 0.049 (H) 0.000 - 0.026 ng/mL   Type & Screen    Collection Time: 09/25/17  9:35 PM   Result Value Ref Range    Group & Rh AB NEG     Indirect Shorty NEG    Brain natriuretic peptide    Collection Time: 09/25/17  9:35 PM   Result Value Ref Range    BNP 3,051 (H) 0 - 99 pg/mL   CBC auto differential    Collection Time: 09/26/17  6:23 AM   Result Value Ref Range    WBC 7.85 3.90 - 12.70 K/uL    RBC 3.27 (L) 4.60 - 6.20 M/uL    Hemoglobin 10.0 (L) 14.0 - 18.0 g/dL    Hematocrit 31.9 (L) 40.0 - 54.0 %    MCV 98 82 - 98 fL    MCH 30.6 27.0 - 31.0 pg    MCHC 31.3 (L) 32.0 - 36.0 g/dL    RDW 15.2 (H) 11.5 - 14.5 %    Platelets 187 150 - 350 K/uL    MPV 11.7 9.2 - 12.9 fL    Gran # 6.8 1.8 - 7.7 K/uL    Lymph # 0.9 (L) 1.0 - 4.8 K/uL    Mono # 0.2 (L)  0.3 - 1.0 K/uL    Eos # 0.0 0.0 - 0.5 K/uL    Baso # 0.00 0.00 - 0.20 K/uL    Gran% 86.0 (H) 38.0 - 73.0 %    Lymph% 11.1 (L) 18.0 - 48.0 %    Mono% 2.8 (L) 4.0 - 15.0 %    Eosinophil% 0.0 0.0 - 8.0 %    Basophil% 0.0 0.0 - 1.9 %    Differential Method Automated    Comprehensive metabolic panel    Collection Time: 09/26/17  6:23 AM   Result Value Ref Range    Sodium 142 136 - 145 mmol/L    Potassium 4.8 3.5 - 5.1 mmol/L    Chloride 111 (H) 95 - 110 mmol/L    CO2 19 (L) 23 - 29 mmol/L    Glucose 167 (H) 70 - 110 mg/dL    BUN, Bld 66 (H) 8 - 23 mg/dL    Creatinine 4.7 (H) 0.5 - 1.4 mg/dL    Calcium 9.1 8.7 - 10.5 mg/dL    Total Protein 6.2 6.0 - 8.4 g/dL    Albumin 3.0 (L) 3.5 - 5.2 g/dL    Total Bilirubin 0.4 0.1 - 1.0 mg/dL    Alkaline Phosphatase 62 55 - 135 U/L    AST 25 10 - 40 U/L    ALT 23 10 - 44 U/L    Anion Gap 12 8 - 16 mmol/L    eGFR if African American 12.2 (A) >60 mL/min/1.73 m^2    eGFR if non African American 10.5 (A) >60 mL/min/1.73 m^2   Magnesium    Collection Time: 09/26/17  6:23 AM   Result Value Ref Range    Magnesium 2.0 1.6 - 2.6 mg/dL   Phosphorus    Collection Time: 09/26/17  6:23 AM   Result Value Ref Range    Phosphorus 4.3 2.7 - 4.5 mg/dL   Troponin I    Collection Time: 09/26/17  6:23 AM   Result Value Ref Range    Troponin I 5.914 (H) 0.000 - 0.026 ng/mL   Anti-Xa Heparin Monitoring    Collection Time: 09/26/17  8:40 AM   Result Value Ref Range    Heparin Anti-Xa <0.10 (L) 0.30 - 0.70 IU/mL   CBC auto differential    Collection Time: 09/26/17  8:40 AM   Result Value Ref Range    WBC 8.42 3.90 - 12.70 K/uL    RBC 3.53 (L) 4.60 - 6.20 M/uL    Hemoglobin 10.8 (L) 14.0 - 18.0 g/dL    Hematocrit 33.1 (L) 40.0 - 54.0 %    MCV 94 82 - 98 fL    MCH 30.6 27.0 - 31.0 pg    MCHC 32.6 32.0 - 36.0 g/dL    RDW 15.3 (H) 11.5 - 14.5 %    Platelets 194 150 - 350 K/uL    MPV 11.6 9.2 - 12.9 fL    Gran # 7.0 1.8 - 7.7 K/uL    Lymph # 1.2 1.0 - 4.8 K/uL    Mono # 0.2 (L) 0.3 - 1.0 K/uL    Eos # 0.0 0.0 -  0.5 K/uL    Baso # 0.00 0.00 - 0.20 K/uL    Gran% 82.7 (H) 38.0 - 73.0 %    Lymph% 14.4 (L) 18.0 - 48.0 %    Mono% 2.7 (L) 4.0 - 15.0 %    Eosinophil% 0.0 0.0 - 8.0 %    Basophil% 0.0 0.0 - 1.9 %    Differential Method Automated          Significant Imaging:  EKG 9/25: ST depression and T wave inversion in inferior leads.   EKG 9/26: resolution of ST and T wave changes. 1st degree AV block.  Chest x-ray 9/25: Stable cardiomegaly with mild bilateral pulmonary edema.  2d Echo pending.     Assessment/Plan:     * NSTEMI (non-ST elevated myocardial infarction)    - Patient presents with 1 day history of chest tightness, EKG with ST depression in inferior leads, and a troponin of 5.   - Patient refused angiogram as it may put him at risk for dialysis, which he does not want.  The risk of death due to large myocardial infarctions was explained to Mr. Maria, however he still deferred angiogram and PCI for medical management.   - Received  and ticagrelor 180 mg in the ED.  - Continue ASA 81 mg QD, ticagrelor 90 mg BID, and heparin gtt x 48 hours.  - Metoprolol succinate 50 mg QD  - Rosuvastatin 10 mg QD (renally dosed).   - EKG 9/26 after chest pain resolved showed resolution of ST and T wave changed with 1st degree AV block.  - Patient currently denies chest pain.           Chronic systolic heart failure    - Patient had an echo on 9/8/17 which showed an EF of 50%  - Presented to the ED yesterday with a BNP of 3900 and pulmonary edema on chest x-ray in the setting of NSTEMI.  - Received lasix 80 mg IV in the ED. Will hold off on further diuresis as patient does not appear to be significantly volume overloaded at this time and is currently asymptomatic.   - Repeat 2D echo with doppler pending.   - Metoprolol succinate 50 mg QD.           Gastroesophageal reflux disease without esophagitis    - Protonix 40 mg QD.           BPH (benign prostatic hyperplasia)    - Continue home flomax 0.4 mg QD.  - Finasteride 5 mg QD.             Gout    - Continue home allopurinol 100 mg QD.           Acquired hypothyroidism    - Continue home synthroid 25 mcg QD.           Anemia of chronic renal failure, stage 5    - Patient received Epo injections every 14 days as an outpatient.  - Hgb currently stable at 10.8  - Will continue to monitor daily CBC.           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 Up from baseline of 3.9  - Could represent gradual worsening of CKD as patient refuses dialysis or 2/2 cardiorenal syndrome given NSTEMI and worsening CHF.   - Continue medical management of HTN, CHF, and NSTEMI.  - Sevelamer 1600 mg PO QD with meals.  - Sodium bicarb 650 mg PO QD.   - Edd continue to monitor renal function.           HLD (hyperlipidemia)    - rosuvastatin 10 mg QD (renally dosed).           Essential hypertension    - isosorbide mononitrate 120 mg PO QD.   - hydralazine 75 mg PO TID.  - metoprolol succinate 50 mg QD.         CAD (coronary artery disease), native coronary artery    - ASA 81 mg QD.  - Rosuvastatin 10 mg QD (renally dosed).             VTE Risk Mitigation         Ordered     High Risk of VTE  Once      09/26/17 0001     Reason for No Pharmacological VTE Prophylaxis  Once      09/26/17 0001             Michael Yanez MD PGY-1   Department of Hospital Medicine   [facility]

## 2017-09-26 NOTE — ED TRIAGE NOTES
Jose ODELL Candace Apodaca., a 85 y.o. male presents to the ED by helicopter. Pt has a history of MI      Chief Complaint   Patient presents with    Chest Pain     arrived by helicopter; pt has been having chest pain since for several hours. pt given 3 sublingual nitro, 4mg morphine, 325mg ASA, 4mg zofran, and arrived with a tridil drip 20mcg/min    Shortness of Breath     Review of patient's allergies indicates:   Allergen Reactions    Benadryl [diphenhydramine hcl]     Versed [midazolam]      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 4/7/2016    Coronary artery disease     Diabetes mellitus     Gout     High cholesterol     Hypertension     Persistent proteinuria 2/5/2017    Renal cyst 2/5/2017    Thyroid disease

## 2017-09-26 NOTE — PROGRESS NOTES
Mr. Maria had relief with GI cocktail.  Symptoms are similar to previous admission, where troponin went to 18, however his TnI is 0.04 with 12 hours of pain.  I ordered a GI cocktail and lasix, which provided complete relief.  His nitro gtt has been weaned off, still chest pain free.  Second troponin is pending, suspect this to be flat, otherwise will treat medically, as he prefers to avoid cardiac cath and dialysis.     Will admit to medicine, trend troponin.    Restart home anti HTN and anti-anginals, okay with gentle diuresis (already ordered lasix).      Josiah Blakely MD  Cardiology Fellow

## 2017-09-26 NOTE — ASSESSMENT & PLAN NOTE
- Patient had an echo on 9/8/17 which showed an EF of 50%  - Presented to the ED yesterday with a BNP of 3900 and pulmonary edema on chest x-ray in the setting of NSTEMI.  - Received lasix 80 mg IV in the ED. Will hold off on further diuresis as patient does not appear to be significantly volume overloaded at this time and is currently asymptomatic.   - Repeat 2D echo with doppler pending.   - Metoprolol succinate 50 mg QD.

## 2017-09-26 NOTE — PROGRESS NOTES
Pt to echo via stretcher. Pt transferred with tele, heparin gtt, and 2 L NC. Wife with patient. In no apparent distress upon departure.

## 2017-09-26 NOTE — MEDICAL/APP STUDENT
"HISTORY & PHYSICAL  Hospital Medicine    [facility]: [facility]    PRESENTING HISTORY     Chief Complaint/Reason for Admission:  Chest tightness    History of Present Illness:  Mr. Jose Maria Jr. is a 85 y.o. male with history of MI s/p CABG, CKD stage IV, HTN, HLD who presented as a transfer from [facility] with generalized chest and abdomen tightness. Onset of tightness and SOB was abrupt starting 1 day ago.  Patient reports tightness was 7/10.  Tightness and SOB aggravated by walking- patient cannot walk further than 25 yards due to symptoms. Symptoms improve with sitting.  Symptoms associated with coughing up clear thick sputum.  The tightness is not associated with diaphoresis and does not radiate.  Patient denies chest pain, SOB currently, nausea, vomiting, diarrhea.     Patient has history of MI 27 years ago, which was managed with a stent. The patient was recently admitted for MI on 9/8/2017, which was treated medically with aspirin and plavix.  However, plavix was discontinued due to development of ecchymosis.    At [facility], patient received nitroglycerin but patient reports persistent discomfort.  In [facility], he was given a GI cocktail that completely relieved the chest tightness.  He was also started on nitroglycerin, IV morphine, aspirin, ticagrelor, and lasix.      Review of Systems:  Constitutional: no fever or chills  Eyes: no visual changes  Respiratory: positive for productive cough, no SOB  Cardiovascular: positive for chest pressure/discomfort and dyspnea  Gastrointestinal: positive for abdominal "tightness", no nausea or vomiting, no abdominal pain or change in bowel habits,   Hematologic/Lymphatic: no easy bruising or lymphadenopathy  Musculoskeletal: no arthralgias or myalgias    PAST HISTORY:     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 4/7/2016    Coronary artery disease     " Diabetes mellitus     Gout     High cholesterol     Hypertension     Persistent proteinuria 2/5/2017    Renal cyst 2/5/2017    Thyroid disease        Past Surgical History:   Procedure Laterality Date    aaa bypass      GALLBLADDER SURGERY      KNEE SURGERY         No family history on file.   Patient reports father had heart failure.    Social History     Social History    Marital status:      Spouse name: N/A    Number of children: N/A    Years of education: N/A     Social History Main Topics    Smoking status: Never Smoker    Smokeless tobacco: Never Used    Alcohol use No    Drug use: No    Sexual activity: Not Currently     Other Topics Concern    None     Social History Narrative    None       MEDICATIONS & ALLERGIES:     No current facility-administered medications on file prior to encounter.      Current Outpatient Prescriptions on File Prior to Encounter   Medication Sig Dispense Refill    ACCU-CHEK TANIKA PLUS TEST STRP Strp once daily.       allopurinol (ZYLOPRIM) 100 MG tablet Take 1 tablet (100 mg total) by mouth once daily. 30 tablet 0    alprazolam (XANAX) 0.25 MG tablet Take 1 tablet (0.25 mg total) by mouth nightly as needed for Anxiety. 60 tablet 0    aspirin 81 MG Chew Take 81 mg by mouth once daily.      calcitRIOL (ROCALTROL) 0.25 MCG Cap Take 1 capsule (0.25 mcg total) by mouth every Mon, Wed, Fri. 12 capsule 11    cyanocobalamin 500 mcg tablet Take 2 tablets (1,000 mcg total) by mouth once daily.      finasteride (PROSCAR) 5 mg tablet Take 5 mg by mouth once daily.      furosemide (LASIX) 40 MG tablet Take 1 tablet (40 mg total) by mouth once daily. 30 tablet 11    hydrALAZINE (APRESOLINE) 25 MG tablet Take 3 tablets (75 mg total) by mouth 3 (three) times daily. 270 tablet 11    isosorbide mononitrate (IMDUR) 120 MG 24 hr tablet Take 1 tablet (120 mg total) by mouth once daily. 30 tablet 11    levothyroxine (SYNTHROID) 25 MCG tablet Take 1 tablet (25 mcg  total) by mouth once daily. 90 tablet 3    metoprolol tartrate (LOPRESSOR) 25 MG tablet Take 0.5 tablets (12.5 mg total) by mouth 2 (two) times daily. 30 tablet 11    multivitamin capsule Take 1 capsule by mouth once daily.      nitroGLYCERIN (NITROSTAT) 0.4 MG SL tablet Place 1 tablet (0.4 mg total) under the tongue every 5 (five) minutes as needed for Chest pain. 25 tablet 1    omega-3 acid ethyl esters (LOVAZA) 1 gram capsule Take 1 capsule (1 g total) by mouth 2 (two) times daily. 60 capsule 3    omeprazole (PRILOSEC) 20 MG capsule Take 1 capsule (20 mg total) by mouth once daily. 30 capsule 11    rosuvastatin (CRESTOR) 40 MG Tab Take 1 tablet (40 mg total) by mouth every Mon, Wed, Fri. 36 tablet 3    sevelamer HCl (RENAGEL) 800 MG Tab Take 2 tablets (1,600 mg total) by mouth 3 (three) times daily with meals. 180 tablet 11    sodium bicarbonate 650 MG tablet Take 1 tablet (650 mg total) by mouth once daily. 90 tablet 3    tamsulosin (FLOMAX) 0.4 mg Cp24 Take 1 capsule (0.4 mg total) by mouth once daily. 30 capsule 11        Review of patient's allergies indicates:   Allergen Reactions    Benadryl [diphenhydramine hcl]     Versed [midazolam]        OBJECTIVE:     Vital Signs:  Temp:  [97.7 °F (36.5 °C)-97.8 °F (36.6 °C)] 97.8 °F (36.6 °C)  Pulse:  [62-97] 68  Resp:  [12-24] 16  SpO2:  [91 %-97 %] 93 %  BP: (113-149)/(59-79) 124/66  Body mass index is 23.86 kg/m².     Physical Exam:  General: well developed, well nourished  HENT: Head:normocephalic, atraumatic. Ears:bilateral TM's and external ear canals normal. Nose: Nares normal. Septum midline. Mucosa normal. No drainage or sinus tenderness., no discharge. Throat: lips, mucosa, and tongue normal; teeth and gums normal and no throat erythema.  Eyes: conjunctivae/corneas clear. PERRL.   Neck: supple, symmetrical, trachea midline  Lungs:  clear to auscultation bilaterally and normal respiratory effort  Cardiovascular: Heart: systolic murmur: faint, in  mitral area. Chest Wall: no tenderness. Extremities: no cyanosis or edema, or clubbing. Pulses: 2+ and symmetric.  Abdomen/Rectal: Abdomen: soft, non-tender slightly distented; bowel sounds normal; no masses,  no organomegaly. Rectal: normal tone, no masses or tenderness and not examined  Skin: bilateral pitting edema noted. Skin color, texture, turgor normal. No rashes or lesions.   Musculoskeletal:normal gait  Neurologic: Normal strength and tone. No focal numbness or weakness  Psych/Behavioral:  Alert and oriented, appropriate affect.    Laboratory  Lab Results   Component Value Date    WBC 7.85 09/26/2017    HGB 10.0 (L) 09/26/2017    HCT 31.9 (L) 09/26/2017    MCV 98 09/26/2017     09/26/2017       Recent Labs  Lab 09/26/17  0623   *      K 4.8   *   CO2 19*   BUN 66*   CREATININE 4.7*   CALCIUM 9.1   MG 2.0     Lab Results   Component Value Date    INR 1.1 09/25/2017    INR 1.0 09/08/2017    INR 1.06 04/18/2016     Lab Results   Component Value Date    HGBA1C 5.5 06/09/2017     No results for input(s): POCTGLUCOSE in the last 72 hours.    Diagnostic Results:  Labs: Reviewed  ECG: Reviewed  CXR: 9/25- showed stable cardiomegaly with mild bilateral pulmonary edema  2D ECHO pending. ECHO from 9/8 showed EF 50% and severe mitral regurgitation.    ASSESSMENT & PLAN:     Current Problems List:  Active Hospital Problems    Diagnosis  POA    *NSTEMI (non-ST elevated myocardial infarction) [I21.4]  Yes    Gastroesophageal reflux disease without esophagitis [K21.9]  Yes    Prediabetes [R73.03]  Yes    Gout [M10.9]  Yes    BPH (benign prostatic hyperplasia) [N40.0]  Yes    Elevated troponin level [R74.8]  Yes    Acquired hypothyroidism [E03.9]  Yes    HLD (hyperlipidemia) [E78.5]  Yes    CKD stage 5 secondary to hypertension [I12.0, N18.5]  Yes    Anemia of chronic renal failure, stage 5 [N18.5, D63.1]  Yes    CAD (coronary artery disease), native coronary artery [I25.10]  Yes      CABG ( SVG-LAD, SVG-PDA, SVG- Om1), PCI to SVG-OM1 (2015),      AP (angina pectoris) [I20.9]  Yes      Resolved Hospital Problems    Diagnosis Date Resolved POA   No resolved problems to display.       HIGH RISK CONDITION(S):  {HIGH RISK CONDITIONS:66744}    Problem Assessment & Treatment Plan:  Mr. Jose Maria is a 84yo M with PMx of MI x2 (s/p CABG in 1990, medically managed in 9/2017), CKD stage IV, HTN, HLD being managed for likely NSTEMI.  Troponin and ECG recording are consistent with NSTEMI.  Nature of patient's symptoms as well as his multiple risk factors make MI likely.      Differentials include angina, pericarditis, acute gastritis, panic attack.  Nature of pain on exertion that is relieved with rest is c/w stable angina.  However, patient was resting when he first experienced chest/abdo discomfort.  Acute pericarditis may be considered due to chest pain, associated SOB, and cough.  However, this patient is afebrile and there is no referral of pain to neck/shoulder.  Acute gastritis may be considered due to patient's history and that the discomfort was relieved by the GI cocktail.  However, it is not uncommon for MI pain to be relieved by antacids.  Panic attack is very unlikely but is a differential due to the sudden onset and associated SOB, as well as history of anxiety.    NSTEMI  - Risk factors: age, Male, hx of MI x2, HTN, HLD  - troponin on admission 0.049, then raised to 5.914  - BNP 3,051  - ECG showed no ST elevation  - cardiology recommends:    - dual antiplatelet tx and heparin for at least 48 hours    - trend troponin to peak   - Repeat EKG  - cont supp O2, IV morphine  - change metoprolol tartrate to metoprolol succinate    CKD Stage IV  - Cr 4.7, continue monitoring  - strict I's and O's    HTN  -cont hydralazine    HLD  - cont rotuvastatin (per pharmacy decr dose to 10mg)    GERD  - cont omeprazole    Gout  - cont allopurinol    BPH  - cont tamsulosin, finasteride    Anxiety  - cont  Xanax    Patient defers angiogram, cath, dialysis.  Patient understands that he is at risk of repeat infarctions and death without angiogram and further procedural intervention.       Ciara Xie MS3

## 2017-09-26 NOTE — PROGRESS NOTES
Spoke with Buzz Sun MD to notify that patient has DNR which has not been signed by the attending.  MD states will let attending know when he arrives at 5pm.  Will continue to monitor patient.

## 2017-09-26 NOTE — ASSESSMENT & PLAN NOTE
- Patient presents with 1 day history of chest tightness, EKG with ST depression in inferior leads, and a troponin of 5.   - Patient refused angiogram as it may put him at risk for dialysis, which he does not want.  The risk of death due to large myocardial infarctions was explained to Mr. Maria, however he still deferred angiogram and PCI for medical management.   - Received  and ticagrelor 180 mg in the ED.  - Continue ASA 81 mg QD, ticagrelor 90 mg BID, and heparin gtt x 48 hours.  - Metoprolol succinate 50 mg QD  - Rosuvastatin 10 mg QD (renally dosed).   - EKG 9/26 after chest pain resolved showed resolution of ST and T wave changed with 1st degree AV block.  - Patient currently denies chest pain.

## 2017-09-26 NOTE — ASSESSMENT & PLAN NOTE
- isosorbide mononitrate 120 mg PO QD.   - hydralazine 75 mg PO TID.  - metoprolol succinate 50 mg QD.

## 2017-09-26 NOTE — HOSPITAL COURSE
Mr. Maria was admitted to Hospital Medicine for NSTEMI (troponin of 5) as he deferred angiogram from cardiology. Patient was started on aspirin, ticagrelor, heparin gtt, metoprolol succinate and rosuvastatin. Troponins peaked at 10 over night and began to trend downward today. He denied having any further episodes of chest pain, however on 9/27 the patient complained of SOB and and appeared volume overloaded. He was given lasix 80 IV BID x 4 doses with resolution of symptoms.   9/28: Patient has decided to pursue possible angiogram. Consulted cardiology who feels cath is warranted and agrees with consult to interventional cardiology and nephrology. Interventional cardiology wants a stress test before proceeding with angiogram given concern for putting patient on dialysis. SPECT stress test was positive for ischemia. Nephrology OK'd patient for heart cath. Patient received heart cath on 9/29 which showed 3 vessel disease. He maintained good UOP s/p cath and there were no acute indications for inpatient dialysis. He will begin cardiac rehab post discharge and follow up with Dr. Chu with interventional cardiology for further assessment for stent placement. Patient will also follow up with nephrology as an outpatient to further assess indications for dialysis. Patient was discharged to home on 9/30/17 with continued medical management of NSTEMI.

## 2017-09-26 NOTE — PHARMACY MED REC
"Admission Medication Reconciliation - Pharmacy Consult Note    The home medication history was taken by Katia Castro Pharmacy Tech. Based on information gathered and subsequent review by the clinical pharmacist, the items below may need attention.    You may go to "Admission" then "Reconcile Home Medications" tabs to review and/or act upon these items.        No issues noted with the medication reconciliation.        Potential issues to be addressed PRIOR TO DISCHARGE  o Patient request for refills  · Cyanocobalamin 1000 mcg daily      Please address this information as you see fit.  Feel free to contact us if you have any questions or require assistance.    Keisha Sandoval, PharmD, [facility]  Internal Medicine Clinical Pharmacy Specialist  Spectra link: [phone]        Patient's prior to admission medication regimen was as follows:  Facility-Administered Medications Prior to Admission   Medication Dose Route Frequency Provider Last Rate Last Dose    epoetin edwin injection 20,000 Units  20,000 Units Subcutaneous Q14 Days Jayleen Fung MD   20,000 Units at 04/27/17 1146     Medication Sig    ACCU-CHEK TANIKA PLUS TEST STRP Strp once daily.     allopurinol (ZYLOPRIM) 100 MG tablet Take 1 tablet (100 mg total) by mouth once daily.    alprazolam (XANAX) 0.25 MG tablet Take 1 tablet (0.25 mg total) by mouth nightly as needed for Anxiety.    aspirin 81 MG Chew Take 81 mg by mouth once daily.    calcitRIOL (ROCALTROL) 0.25 MCG Cap Take 1 capsule (0.25 mcg total) by mouth every Mon, Wed, Fri.    finasteride (PROSCAR) 5 mg tablet Take 5 mg by mouth once daily.    furosemide (LASIX) 40 MG tablet Take 1 tablet (40 mg total) by mouth once daily.    hydrALAZINE (APRESOLINE) 25 MG tablet Take 3 tablets (75 mg total) by mouth 3 (three) times daily.    isosorbide mononitrate (IMDUR) 120 MG 24 hr tablet Take 1 tablet (120 mg total) by mouth once daily.    levothyroxine (SYNTHROID) 25 MCG tablet Take 1 tablet (25 mcg total) " by mouth once daily.    metoprolol tartrate (LOPRESSOR) 25 MG tablet Take 0.5 tablets (12.5 mg total) by mouth 2 (two) times daily.    multivitamin capsule Take 1 capsule by mouth once daily.    nitroGLYCERIN (NITROSTAT) 0.4 MG SL tablet Place 1 tablet (0.4 mg total) under the tongue every 5 (five) minutes as needed for Chest pain.    omega-3 acid ethyl esters (LOVAZA) 1 gram capsule Take 1 capsule (1 g total) by mouth 2 (two) times daily.    omeprazole (PRILOSEC) 20 MG capsule Take 1 capsule (20 mg total) by mouth once daily.    rosuvastatin (CRESTOR) 40 MG Tab Take 1 tablet (40 mg total) by mouth every Mon, Wed, Fri.    sevelamer HCl (RENAGEL) 800 MG Tab Take 2 tablets (1,600 mg total) by mouth 3 (three) times daily with meals.    sodium bicarbonate 650 MG tablet Take 1 tablet (650 mg total) by mouth once daily.    tamsulosin (FLOMAX) 0.4 mg Cp24 Take 1 capsule (0.4 mg total) by mouth once daily.    cyanocobalamin 500 mcg tablet Take 2 tablets (1,000 mcg total) by mouth once daily.         Please add appropriate    SmartPhrase below:

## 2017-09-26 NOTE — ED PROVIDER NOTES
"Encounter Date: 9/25/2017       History     Chief Complaint   Patient presents with    Chest Pain     arrived by helicopter; pt has been having chest pain since for several hours. pt given 3 sublingual nitro, 4mg morphine, 325mg ASA, 4mg zofran, and arrived with a tridil drip 20mcg/min    Shortness of Breath     85 YOM with a hx of MI s/p CABG, DM, CKD4, HTN, HLD, Hypothyroidism presents to the ED with CP. Pt states he has been having "pressure" pain to the center of his chest for weeks. Worsened this AM and has been constant. Feels similar to prior heart attacks. Denies associated sweating, N/V, SOB. Denies fever, productive cough, abdominal pain, or changes in bowel or bladder function.   Pt initially seen at OSH. Concern for STEMI on EKG, so sent by helicopter for cath. Given ASA, Morphine, Zofran, and NTG. Started on NTG drip.       The history is provided by the patient.     Review of patient's allergies indicates:   Allergen Reactions    Benadryl [diphenhydramine hcl]     Versed [midazolam]      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 4/7/2016    Coronary artery disease     Diabetes mellitus     Gout     High cholesterol     Hypertension     Persistent proteinuria 2/5/2017    Renal cyst 2/5/2017    Thyroid disease      Past Surgical History:   Procedure Laterality Date    aaa bypass      GALLBLADDER SURGERY      KNEE SURGERY       No family history on file.  Social History   Substance Use Topics    Smoking status: Never Smoker    Smokeless tobacco: Never Used    Alcohol use No     Review of Systems   Constitutional: Negative for diaphoresis and fever.   HENT: Negative for congestion.    Eyes: Negative for visual disturbance.   Respiratory: Negative for shortness of breath.    Cardiovascular: Positive for chest pain. Negative for leg swelling.   Gastrointestinal: Negative for abdominal " pain, nausea and vomiting.   Genitourinary: Negative for dysuria.   Musculoskeletal: Negative for neck pain.   Skin: Negative for rash and wound.   Neurological: Negative for dizziness and headaches.   Psychiatric/Behavioral: Negative for agitation and confusion.   All other systems reviewed and are negative.      Physical Exam     Initial Vitals   BP Pulse Resp Temp SpO2   -- -- -- -- --      MAP       --         Physical Exam    Constitutional: He appears well-developed and well-nourished. He is not diaphoretic. He appears distressed.   HENT:   Head: Normocephalic and atraumatic.   Mouth/Throat: Oropharynx is clear and moist.   Eyes: Conjunctivae and EOM are normal. Pupils are equal, round, and reactive to light.   Neck: Normal range of motion.   Cardiovascular: Normal rate, regular rhythm, normal heart sounds and intact distal pulses.   No murmur heard.  Pulmonary/Chest: Breath sounds normal. No respiratory distress. He has no wheezes. He exhibits no tenderness.   Midline surgical scar, well-healed.   Abdominal: Soft. He exhibits no distension. There is no tenderness.   Musculoskeletal: Normal range of motion. He exhibits edema (Mild, non-pitting up to shins). He exhibits no tenderness.   Neurological: He is alert.   Skin: Skin is warm and dry. No rash noted. No erythema.   Psychiatric: He has a normal mood and affect. Thought content normal.         ED Course   Procedures  Labs Reviewed - No data to display                APC / Resident Notes:   HOIV:  85 YOM with a hx of MI s/p CABG, DM, CKD4, HTN, HLD, Hypothyroidism presents to the ED with CP.  DDx STEMI vs Angina vs MSK vs PNA vs AAA/Dissection vs PE  Plan: Afebrile, BP 140s/70s on NTG drip, VS otherwise stable. O2 sat 91-94% on RA. EKG shows elevation in AVR with diffuse depressions. Cardiology at bedside. Initially called STEMI activation, but pt's Cr 6. Will not bring him to cath due to poor renal function. Previous EKG shows similar morphology. Pending  labs at this time. Pt continues to have CP.   Morgan Rhoades MD  [facility] EM IV  9/25/2017 10:26 PM    HOIV:  Labs show Trop 0.049. BNP 3100, which is much higher than previous of 150. XR shows mild pulm edema. Pt's CP has improved with GI cocktail. Cardiology does not feel that this is an ischemic process at this time. Recommend he be admitted by IM for trending of Trop.  Morgan Rhoades MD  [facility]  9/26/2017 1:15 AM                ED Course      Clinical Impression:   The primary encounter diagnosis was NSTEMI (non-ST elevated myocardial infarction). Diagnoses of Coronary artery disease involving native coronary artery with other form of angina pectoris, unspecified whether native or transplanted heart, Chest pain, unspecified type, and Congestive heart failure, unspecified congestive heart failure chronicity, unspecified congestive heart failure type were also pertinent to this visit.                           Morgan Rhoades MD  Resident  09/26/17 0202

## 2017-09-26 NOTE — PLAN OF CARE
Problem: Patient Care Overview  Goal: Plan of Care Review  Outcome: Ongoing (interventions implemented as appropriate)  Pt in bed with bed in lowest/locked position. Call light in reach. Free from falls/injury this shift and wearing non-skid footwear when ambulating. Wife at bedside. No c/o chest pain this shift. Heparin gtt started, infusing @ 13 units/kg or 9.5 cc/hr. Pt has received IV marcia gluconate. Pt reporting poor appetite with cardiac diet. 12 lead EKG and 2D heart echo completed today. Pt made DNR today.     Will continue to monitor, assess, and adjust care as needed.

## 2017-09-27 LAB
ALBUMIN SERPL BCP-MCNC: 2.9 G/DL
ALP SERPL-CCNC: 54 U/L
ALT SERPL W/O P-5'-P-CCNC: 21 U/L
ANION GAP SERPL CALC-SCNC: 10 MMOL/L
AST SERPL-CCNC: 20 U/L
BASOPHILS # BLD AUTO: 0.01 K/UL
BASOPHILS NFR BLD: 0.1 %
BILIRUB SERPL-MCNC: 0.3 MG/DL
BUN SERPL-MCNC: 77 MG/DL
CALCIUM SERPL-MCNC: 8.7 MG/DL
CHLORIDE SERPL-SCNC: 108 MMOL/L
CHOLEST SERPL-MCNC: 68 MG/DL
CHOLEST/HDLC SERPL: 1.9 {RATIO}
CO2 SERPL-SCNC: 20 MMOL/L
CREAT SERPL-MCNC: 5.1 MG/DL
DIFFERENTIAL METHOD: ABNORMAL
EOSINOPHIL # BLD AUTO: 0 K/UL
EOSINOPHIL NFR BLD: 0.6 %
ERYTHROCYTE [DISTWIDTH] IN BLOOD BY AUTOMATED COUNT: 15.4 %
EST. GFR  (AFRICAN AMERICAN): 11 ML/MIN/1.73 M^2
EST. GFR  (NON AFRICAN AMERICAN): 9.5 ML/MIN/1.73 M^2
FACT X PPP CHRO-ACNC: 0.54 IU/ML
GLUCOSE SERPL-MCNC: 120 MG/DL
HCT VFR BLD AUTO: 29.3 %
HDLC SERPL-MCNC: 36 MG/DL
HDLC SERPL: 52.9 %
HGB BLD-MCNC: 9.3 G/DL
LDLC SERPL CALC-MCNC: 18.2 MG/DL
LYMPHOCYTES # BLD AUTO: 1.1 K/UL
LYMPHOCYTES NFR BLD: 14.6 %
MAGNESIUM SERPL-MCNC: 2 MG/DL
MCH RBC QN AUTO: 30.9 PG
MCHC RBC AUTO-ENTMCNC: 31.7 G/DL
MCV RBC AUTO: 97 FL
MONOCYTES # BLD AUTO: 0.3 K/UL
MONOCYTES NFR BLD: 4.1 %
NEUTROPHILS # BLD AUTO: 5.8 K/UL
NEUTROPHILS NFR BLD: 80.3 %
NONHDLC SERPL-MCNC: 32 MG/DL
PHOSPHATE SERPL-MCNC: 4.2 MG/DL
PLATELET # BLD AUTO: 158 K/UL
PMV BLD AUTO: 11.7 FL
POTASSIUM SERPL-SCNC: 4.5 MMOL/L
PROT SERPL-MCNC: 5.8 G/DL
RBC # BLD AUTO: 3.01 M/UL
SODIUM SERPL-SCNC: 138 MMOL/L
TRIGL SERPL-MCNC: 69 MG/DL
TROPONIN I SERPL DL<=0.01 NG/ML-MCNC: 7.3 NG/ML
TROPONIN I SERPL DL<=0.01 NG/ML-MCNC: 8.2 NG/ML
WBC # BLD AUTO: 7.26 K/UL

## 2017-09-27 PROCEDURE — A4216 STERILE WATER/SALINE, 10 ML: HCPCS | Performed by: STUDENT IN AN ORGANIZED HEALTH CARE EDUCATION/TRAINING PROGRAM

## 2017-09-27 PROCEDURE — 36415 COLL VENOUS BLD VENIPUNCTURE: CPT

## 2017-09-27 PROCEDURE — 25000003 PHARM REV CODE 250: Performed by: STUDENT IN AN ORGANIZED HEALTH CARE EDUCATION/TRAINING PROGRAM

## 2017-09-27 PROCEDURE — 84100 ASSAY OF PHOSPHORUS: CPT

## 2017-09-27 PROCEDURE — 84484 ASSAY OF TROPONIN QUANT: CPT | Mod: 91

## 2017-09-27 PROCEDURE — 80053 COMPREHEN METABOLIC PANEL: CPT

## 2017-09-27 PROCEDURE — 85520 HEPARIN ASSAY: CPT

## 2017-09-27 PROCEDURE — 93010 ELECTROCARDIOGRAM REPORT: CPT | Mod: 76,,, | Performed by: INTERNAL MEDICINE

## 2017-09-27 PROCEDURE — 63600175 PHARM REV CODE 636 W HCPCS: Performed by: INTERNAL MEDICINE

## 2017-09-27 PROCEDURE — 20600001 HC STEP DOWN PRIVATE ROOM

## 2017-09-27 PROCEDURE — 99233 SBSQ HOSP IP/OBS HIGH 50: CPT | Mod: GC,,, | Performed by: INTERNAL MEDICINE

## 2017-09-27 PROCEDURE — 80061 LIPID PANEL: CPT

## 2017-09-27 PROCEDURE — 25000003 PHARM REV CODE 250: Performed by: INTERNAL MEDICINE

## 2017-09-27 PROCEDURE — 83735 ASSAY OF MAGNESIUM: CPT

## 2017-09-27 PROCEDURE — 93010 ELECTROCARDIOGRAM REPORT: CPT | Mod: ,,, | Performed by: INTERNAL MEDICINE

## 2017-09-27 PROCEDURE — 84484 ASSAY OF TROPONIN QUANT: CPT

## 2017-09-27 PROCEDURE — 99232 SBSQ HOSP IP/OBS MODERATE 35: CPT | Mod: GC,,, | Performed by: INTERNAL MEDICINE

## 2017-09-27 PROCEDURE — 85025 COMPLETE CBC W/AUTO DIFF WBC: CPT

## 2017-09-27 PROCEDURE — 93005 ELECTROCARDIOGRAM TRACING: CPT

## 2017-09-27 RX ORDER — FUROSEMIDE 40 MG/1
40 TABLET ORAL DAILY
Status: DISCONTINUED | OUTPATIENT
Start: 2017-09-27 | End: 2017-09-27

## 2017-09-27 RX ORDER — FUROSEMIDE 10 MG/ML
80 INJECTION INTRAMUSCULAR; INTRAVENOUS 2 TIMES DAILY
Status: DISCONTINUED | OUTPATIENT
Start: 2017-09-27 | End: 2017-09-29

## 2017-09-27 RX ADMIN — ALLOPURINOL 100 MG: 100 TABLET ORAL at 08:09

## 2017-09-27 RX ADMIN — TICAGRELOR 90 MG: 90 TABLET ORAL at 08:09

## 2017-09-27 RX ADMIN — TICAGRELOR 90 MG: 90 TABLET ORAL at 09:09

## 2017-09-27 RX ADMIN — CALCITRIOL 0.25 MCG: 0.25 CAPSULE, LIQUID FILLED ORAL at 05:09

## 2017-09-27 RX ADMIN — FUROSEMIDE 80 MG: 10 INJECTION, SOLUTION INTRAVENOUS at 02:09

## 2017-09-27 RX ADMIN — Medication 3 ML: at 10:09

## 2017-09-27 RX ADMIN — ISOSORBIDE MONONITRATE 120 MG: 30 TABLET, EXTENDED RELEASE ORAL at 11:09

## 2017-09-27 RX ADMIN — OMEGA-3-ACID ETHYL ESTERS 1 G: 1 CAPSULE, LIQUID FILLED ORAL at 08:09

## 2017-09-27 RX ADMIN — HYDRALAZINE HYDROCHLORIDE 75 MG: 50 TABLET ORAL at 09:09

## 2017-09-27 RX ADMIN — ASPIRIN 81 MG CHEWABLE TABLET 81 MG: 81 TABLET CHEWABLE at 08:09

## 2017-09-27 RX ADMIN — TAMSULOSIN HYDROCHLORIDE 0.4 MG: 0.4 CAPSULE ORAL at 08:09

## 2017-09-27 RX ADMIN — OMEGA-3-ACID ETHYL ESTERS 1 G: 1 CAPSULE, LIQUID FILLED ORAL at 09:09

## 2017-09-27 RX ADMIN — HEPARIN SODIUM AND DEXTROSE 13 UNITS/KG/HR: 10000; 5 INJECTION INTRAVENOUS at 08:09

## 2017-09-27 RX ADMIN — SODIUM BICARBONATE 650 MG TABLET 650 MG: at 08:09

## 2017-09-27 RX ADMIN — ROSUVASTATIN CALCIUM 10 MG: 5 TABLET ORAL at 08:09

## 2017-09-27 RX ADMIN — SEVELAMER CARBONATE 1600 MG: 800 TABLET, FILM COATED ORAL at 11:09

## 2017-09-27 RX ADMIN — HYDRALAZINE HYDROCHLORIDE 75 MG: 50 TABLET ORAL at 02:09

## 2017-09-27 RX ADMIN — PANTOPRAZOLE SODIUM 40 MG: 40 TABLET, DELAYED RELEASE ORAL at 08:09

## 2017-09-27 RX ADMIN — HYDRALAZINE HYDROCHLORIDE 75 MG: 50 TABLET ORAL at 05:09

## 2017-09-27 RX ADMIN — FUROSEMIDE 40 MG: 40 TABLET ORAL at 11:09

## 2017-09-27 RX ADMIN — SEVELAMER CARBONATE 1600 MG: 800 TABLET, FILM COATED ORAL at 08:09

## 2017-09-27 RX ADMIN — METOPROLOL SUCCINATE 50 MG: 25 TABLET, EXTENDED RELEASE ORAL at 08:09

## 2017-09-27 RX ADMIN — LEVOTHYROXINE SODIUM 25 MCG: 25 TABLET ORAL at 05:09

## 2017-09-27 RX ADMIN — FINASTERIDE 5 MG: 5 TABLET, FILM COATED ORAL at 08:09

## 2017-09-27 NOTE — ASSESSMENT & PLAN NOTE
- Patient presents with 1 day history of chest tightness, EKG with ST depression in inferior leads, and a troponin of 5.   - Patient initially refused angiogram as it may put him at risk for dialysis, which he did  not want.  The risk of death due to large myocardial infarctions was explained to Mr. Maria, however he still deferred angiogram and PCI for medical management.   - Continue ASA 81 mg QD, ticagrelor 90 mg BID, and heparin gtt x 48 hours.  - Metoprolol succinate 50 mg QD  - Rosuvastatin 10 mg QD (renally dosed).   - EKG 9/26 after chest pain resolved showed resolution of ST and T wave changed with 1st degree AV block.  - Troponins peaked at 10 over night and have trended down to 7 today.  - Patient informed us today that he would now like to have an angiogram even though it may cause him to need dialysis.  - Consulted cardiology to discuss patient's options, as he is a complicated case given his severity of ESRD and would likely need dialysis prior to receiving angiogram.  - Will need to coordinate care between nephrology and cardiology.

## 2017-09-27 NOTE — SUBJECTIVE & OBJECTIVE
Interval History: NAEON. Patient denies any further symptoms of chest pain. Troponin trending downward over night.     Review of Systems   Constitutional: Negative for chills, diaphoresis and fever.   HENT: Negative for congestion and sore throat.    Eyes: Negative for photophobia and visual disturbance.   Respiratory: Negative for cough, chest tightness and shortness of breath.    Cardiovascular: Negative for chest pain and palpitations.   Gastrointestinal: Negative for abdominal pain, nausea and vomiting.   Genitourinary: Negative for dysuria and hematuria.   Musculoskeletal: Negative for arthralgias and myalgias.   Skin: Negative for color change and rash.   Neurological: Negative for light-headedness and headaches.     Objective:     Vital Signs (Most Recent):  Temp: 97.9 °F (36.6 °C) (09/27/17 1520)  Pulse: 71 (09/27/17 1520)  Resp: 12 (09/27/17 1520)  BP: 136/62 (09/27/17 1520)  SpO2: 95 % (09/27/17 1520) Vital Signs (24h Range):  Temp:  [97.5 °F (36.4 °C)-97.9 °F (36.6 °C)] 97.9 °F (36.6 °C)  Pulse:  [63-88] 71  Resp:  [12-20] 12  SpO2:  [94 %-98 %] 95 %  BP: (121-136)/(58-68) 136/62     Weight: 73.2 kg (161 lb 6 oz)  Body mass index is 23.83 kg/m².    Intake/Output Summary (Last 24 hours) at 09/27/17 1544  Last data filed at 09/27/17 1520   Gross per 24 hour   Intake           712.26 ml   Output              650 ml   Net            62.26 ml      Physical Exam   Constitutional: He is oriented to person, place, and time. He appears well-developed and well-nourished. No distress.   HENT:   Head: Normocephalic and atraumatic.   Mouth/Throat: Oropharynx is clear and moist.   Eyes: EOM are normal. Pupils are equal, round, and reactive to light.   Neck: Neck supple. No JVD present.   Cardiovascular: Normal rate, regular rhythm and intact distal pulses.  Exam reveals no gallop and no friction rub.    Murmur heard.  Pulmonary/Chest: Effort normal and breath sounds normal. No respiratory distress. He has no wheezes.  He has no rales.   Abdominal: Soft. Bowel sounds are normal. He exhibits no distension. There is no tenderness. There is no guarding.   Musculoskeletal: Normal range of motion.   Bilateral lower extremity 2+ pitting edema up to shins.    Neurological: He is alert and oriented to person, place, and time.   Skin: Skin is warm and dry. Capillary refill takes less than 2 seconds. He is not diaphoretic.       Significant Labs:   Recent Results (from the past 24 hour(s))   Anti-Xa Heparin Monitoring    Collection Time: 09/26/17  6:43 PM   Result Value Ref Range    Heparin Anti-Xa 0.61 0.30 - 0.70 IU/mL   Troponin I    Collection Time: 09/26/17  6:43 PM   Result Value Ref Range    Troponin I 10.118 (H) 0.000 - 0.026 ng/mL   Troponin I    Collection Time: 09/27/17 12:18 AM   Result Value Ref Range    Troponin I 8.200 (H) 0.000 - 0.026 ng/mL   Comprehensive metabolic panel    Collection Time: 09/27/17  5:00 AM   Result Value Ref Range    Sodium 138 136 - 145 mmol/L    Potassium 4.5 3.5 - 5.1 mmol/L    Chloride 108 95 - 110 mmol/L    CO2 20 (L) 23 - 29 mmol/L    Glucose 120 (H) 70 - 110 mg/dL    BUN, Bld 77 (H) 8 - 23 mg/dL    Creatinine 5.1 (H) 0.5 - 1.4 mg/dL    Calcium 8.7 8.7 - 10.5 mg/dL    Total Protein 5.8 (L) 6.0 - 8.4 g/dL    Albumin 2.9 (L) 3.5 - 5.2 g/dL    Total Bilirubin 0.3 0.1 - 1.0 mg/dL    Alkaline Phosphatase 54 (L) 55 - 135 U/L    AST 20 10 - 40 U/L    ALT 21 10 - 44 U/L    Anion Gap 10 8 - 16 mmol/L    eGFR if African American 11.0 (A) >60 mL/min/1.73 m^2    eGFR if non African American 9.5 (A) >60 mL/min/1.73 m^2   Magnesium    Collection Time: 09/27/17  5:00 AM   Result Value Ref Range    Magnesium 2.0 1.6 - 2.6 mg/dL   Phosphorus    Collection Time: 09/27/17  5:00 AM   Result Value Ref Range    Phosphorus 4.2 2.7 - 4.5 mg/dL   Anti-Xa Heparin Monitoring    Collection Time: 09/27/17  5:00 AM   Result Value Ref Range    Heparin Anti-Xa 0.54 0.30 - 0.70 IU/mL   Troponin I    Collection Time: 09/27/17  5:00  AM   Result Value Ref Range    Troponin I 7.303 (H) 0.000 - 0.026 ng/mL   CBC auto differential    Collection Time: 17  5:00 AM   Result Value Ref Range    WBC 7.26 3.90 - 12.70 K/uL    RBC 3.01 (L) 4.60 - 6.20 M/uL    Hemoglobin 9.3 (L) 14.0 - 18.0 g/dL    Hematocrit 29.3 (L) 40.0 - 54.0 %    MCV 97 82 - 98 fL    MCH 30.9 27.0 - 31.0 pg    MCHC 31.7 (L) 32.0 - 36.0 g/dL    RDW 15.4 (H) 11.5 - 14.5 %    Platelets 158 150 - 350 K/uL    MPV 11.7 9.2 - 12.9 fL    Gran # 5.8 1.8 - 7.7 K/uL    Lymph # 1.1 1.0 - 4.8 K/uL    Mono # 0.3 0.3 - 1.0 K/uL    Eos # 0.0 0.0 - 0.5 K/uL    Baso # 0.01 0.00 - 0.20 K/uL    Gran% 80.3 (H) 38.0 - 73.0 %    Lymph% 14.6 (L) 18.0 - 48.0 %    Mono% 4.1 4.0 - 15.0 %    Eosinophil% 0.6 0.0 - 8.0 %    Basophil% 0.1 0.0 - 1.9 %    Differential Method Automated    Lipid panel    Collection Time: 17  5:00 AM   Result Value Ref Range    Cholesterol 68 (L) 120 - 199 mg/dL    Triglycerides 69 30 - 150 mg/dL    HDL 36 (L) 40 - 75 mg/dL    LDL Cholesterol 18.2 (L) 63.0 - 159.0 mg/dL    HDL/Chol Ratio 52.9 (H) 20.0 - 50.0 %    Total Cholesterol/HDL Ratio 1.9 (L) 2.0 - 5.0    Non-HDL Cholesterol 32 mg/dL         Significant ImaginD echo with doppler:   1 - Low normal to mildly depressed left ventricular systolic function (EF 50-55%).     2 - Biatrial enlargement.     3 - Eccentric hypertrophy.     4 - Wall motion abnormalities.     5 - Mild left ventricular enlargement.     6 - Severe mitral regurgitation.     7 - Right ventricular enlargement with mildly depressed systolic function.     8 - Pulmonary hypertension. The estimated PA systolic pressure is 59 mmHg.     9 - Severe tricuspid regurgitation.     10 - Increased central venous pressure.

## 2017-09-27 NOTE — HPI
85 y.o. male with a history significant for CAD s/p CABG (SVG-LAD, SVG-PDA, SVG-OM1), PCI to SVG-OM1 (2015), ICM, CKD IV/V (baseline Cr 4), HTN, HLD presenting with substernal chest tightness and indigestion on 9/25 at 10AM. He was seen in the ED on 9/25, initially with troponin of 0.04, subsequently increased to 5.9-->10.3 peak before downtrending. EKGs with SHAYY in aVR, worsened STD and TWI from baseline in inferolateral leads. He was managed medically per patient preference for NSTEMI given his CKD and aversion to Hd.  He was recently admitted on 9/10 with NSTEMI, troponin peaked at 18. He is a patient of Dr. Marin at [facility]. Managed medically at that time due to his renal function.  He has had a AVF placed in his R arm ~6 weeks ago. Though he initially declined intervention as he was advised that he would likely progress to ESRD/HD. However, he now reports wanting intervention and willingness to undergo Hd.  An echo showed EF 50-55%, severely hypokinetic apical septum, apical lateral wall, mid anterolateral wall. He was started on aspirin, ticagrelor, heparin gtt, metoprolol succinate and rosuvastatin. He has been CP free since the ED on 9/25.    Upon examination today, he reports increased SOB. Endorses orthopnea. No MUMTAZ.

## 2017-09-27 NOTE — ASSESSMENT & PLAN NOTE
-Troponin peaked 10. TTE EF 50-55%, apical septum, apical lateral wall, mid anterolateral wall hypokinesis  -EKG on admit with worsening inferolateral STD and TWI, aVR elevated; now resolved back to baseline and CP free   -Continue medical management with ASA/ticagrelor/toprol/renally dosed crestor/heparin gtt  -Currently appears volume overloaded, starting diuresis as per primary  -Previously chose medical therapy as above due to desire to avoid HD though has new AVF  -Agree with Nephrology involvement  -Pt now amenable to intervention and willing to undergo Hd. Pt with 2 NSTEMIs in last two weeks, given likelihood of recurrent ischemic events, reasonable to pursue intervention at this time  -Will discuss with Interventional Cardiology

## 2017-09-27 NOTE — PLAN OF CARE
Problem: Patient Care Overview  Goal: Plan of Care Review  Pt aaox4 vswnl and no c/o pain. Wife at bedside. Bed in low position and callbell within reach. Heparin gtt at 13units/hr. Anti xa wnl at 0.61. Will recheck in am with labs. Rt upper arn graft ++. Telemetry maintained nsr. Pt truns independently. Pt is dnr/dni . Standard precautions maintained.

## 2017-09-27 NOTE — PROGRESS NOTES
[facility] Hospital Medicine  Progress Note    Patient Name: Jose Maria Jr.  MRN: [id number]  Patient Class: IP- Inpatient   Admission Date: 9/25/2017  Length of Stay: 1 days  Attending Physician: Dylan Bedolla MD  Primary Care Provider: Lamar Felix MD    Hospital Medicine Team: [facility] HOSP MED 4 Michael Yanez MD    Subjective:     Principal Problem:NSTEMI (non-ST elevated myocardial infarction)    HPI:  Mr. Maria is a 86 y/o gentleman with PMHx  MI x 2 s/p CABG, DM, CKD4, HTN, HLD, Hypothyroidism, and gout who presents to [facility] as a transfer from [facility] for evaluation of NSTEMI. Yesterday morning, the patient started experiencing generalized chest and abdominal tightness 7/10 in severity.  It was exacerbated by exertion and relieved with rest, and was associated with SOB and a cough productive of clear sputum. He denied having diaphoresis or associated radiation of his chest tightness. He otherwise denies any fevers, chills, n/v/d, or dysuria.     Mr. Maria was recently admitted by Dr. Gorman for NSTEMI, and states his pain yesterday was similar to the pain he had on that admission. He was treated medically at that time due to his renal function. The patient does not want to be on dialysis, and deferred angiogram from cardiology on this admission due to the likelihood of him receiving dialysis s/p angiogram. The risk of death due to large myocardial infarctions was explained to Mr. Maria, however he still deferred angiogram and PCI and opted for medical management.           Hospital Course:  Mr. Maria was admitted to Hospital Medicine for NSTEMI (troponin of 5) as he deferred angiogram from cardiology. Patient was started on aspirin, ticagrelor, heparin gtt, metoprolol succinate and rosuvastatin. Troponins peaked at 10 over night and began to trend downward today. He denied having any further episodes of chest pain, however on 9/27 the patient complained of SOB and and  appeared volume overloaded. He was given lasix 80 IV BID x 4 doses.     Interval History: NAEON. Patient denies any further symptoms of chest pain. Troponin trending downward over night.     Review of Systems   Constitutional: Negative for chills, diaphoresis and fever.   HENT: Negative for congestion and sore throat.    Eyes: Negative for photophobia and visual disturbance.   Respiratory: Negative for cough, chest tightness and shortness of breath.    Cardiovascular: Negative for chest pain and palpitations.   Gastrointestinal: Negative for abdominal pain, nausea and vomiting.   Genitourinary: Negative for dysuria and hematuria.   Musculoskeletal: Negative for arthralgias and myalgias.   Skin: Negative for color change and rash.   Neurological: Negative for light-headedness and headaches.     Objective:     Vital Signs (Most Recent):  Temp: 97.9 °F (36.6 °C) (09/27/17 1520)  Pulse: 71 (09/27/17 1520)  Resp: 12 (09/27/17 1520)  BP: 136/62 (09/27/17 1520)  SpO2: 95 % (09/27/17 1520) Vital Signs (24h Range):  Temp:  [97.5 °F (36.4 °C)-97.9 °F (36.6 °C)] 97.9 °F (36.6 °C)  Pulse:  [63-88] 71  Resp:  [12-20] 12  SpO2:  [94 %-98 %] 95 %  BP: (121-136)/(58-68) 136/62     Weight: 73.2 kg (161 lb 6 oz)  Body mass index is 23.83 kg/m².    Intake/Output Summary (Last 24 hours) at 09/27/17 1544  Last data filed at 09/27/17 1520   Gross per 24 hour   Intake           712.26 ml   Output              650 ml   Net            62.26 ml      Physical Exam   Constitutional: He is oriented to person, place, and time. He appears well-developed and well-nourished. No distress.   HENT:   Head: Normocephalic and atraumatic.   Mouth/Throat: Oropharynx is clear and moist.   Eyes: EOM are normal. Pupils are equal, round, and reactive to light.   Neck: Neck supple. No JVD present.   Cardiovascular: Normal rate, regular rhythm and intact distal pulses.  Exam reveals no gallop and no friction rub.    Murmur heard.  Pulmonary/Chest: Effort normal  and breath sounds normal. No respiratory distress. He has no wheezes. He has no rales.   Abdominal: Soft. Bowel sounds are normal. He exhibits no distension. There is no tenderness. There is no guarding.   Musculoskeletal: Normal range of motion.   Bilateral lower extremity 2+ pitting edema up to shins.    Neurological: He is alert and oriented to person, place, and time.   Skin: Skin is warm and dry. Capillary refill takes less than 2 seconds. He is not diaphoretic.       Significant Labs:   Recent Results (from the past 24 hour(s))   Anti-Xa Heparin Monitoring    Collection Time: 09/26/17  6:43 PM   Result Value Ref Range    Heparin Anti-Xa 0.61 0.30 - 0.70 IU/mL   Troponin I    Collection Time: 09/26/17  6:43 PM   Result Value Ref Range    Troponin I 10.118 (H) 0.000 - 0.026 ng/mL   Troponin I    Collection Time: 09/27/17 12:18 AM   Result Value Ref Range    Troponin I 8.200 (H) 0.000 - 0.026 ng/mL   Comprehensive metabolic panel    Collection Time: 09/27/17  5:00 AM   Result Value Ref Range    Sodium 138 136 - 145 mmol/L    Potassium 4.5 3.5 - 5.1 mmol/L    Chloride 108 95 - 110 mmol/L    CO2 20 (L) 23 - 29 mmol/L    Glucose 120 (H) 70 - 110 mg/dL    BUN, Bld 77 (H) 8 - 23 mg/dL    Creatinine 5.1 (H) 0.5 - 1.4 mg/dL    Calcium 8.7 8.7 - 10.5 mg/dL    Total Protein 5.8 (L) 6.0 - 8.4 g/dL    Albumin 2.9 (L) 3.5 - 5.2 g/dL    Total Bilirubin 0.3 0.1 - 1.0 mg/dL    Alkaline Phosphatase 54 (L) 55 - 135 U/L    AST 20 10 - 40 U/L    ALT 21 10 - 44 U/L    Anion Gap 10 8 - 16 mmol/L    eGFR if African American 11.0 (A) >60 mL/min/1.73 m^2    eGFR if non African American 9.5 (A) >60 mL/min/1.73 m^2   Magnesium    Collection Time: 09/27/17  5:00 AM   Result Value Ref Range    Magnesium 2.0 1.6 - 2.6 mg/dL   Phosphorus    Collection Time: 09/27/17  5:00 AM   Result Value Ref Range    Phosphorus 4.2 2.7 - 4.5 mg/dL   Anti-Xa Heparin Monitoring    Collection Time: 09/27/17  5:00 AM   Result Value Ref Range    Heparin Anti-Xa  0.54 0.30 - 0.70 IU/mL   Troponin I    Collection Time: 17  5:00 AM   Result Value Ref Range    Troponin I 7.303 (H) 0.000 - 0.026 ng/mL   CBC auto differential    Collection Time: 17  5:00 AM   Result Value Ref Range    WBC 7.26 3.90 - 12.70 K/uL    RBC 3.01 (L) 4.60 - 6.20 M/uL    Hemoglobin 9.3 (L) 14.0 - 18.0 g/dL    Hematocrit 29.3 (L) 40.0 - 54.0 %    MCV 97 82 - 98 fL    MCH 30.9 27.0 - 31.0 pg    MCHC 31.7 (L) 32.0 - 36.0 g/dL    RDW 15.4 (H) 11.5 - 14.5 %    Platelets 158 150 - 350 K/uL    MPV 11.7 9.2 - 12.9 fL    Gran # 5.8 1.8 - 7.7 K/uL    Lymph # 1.1 1.0 - 4.8 K/uL    Mono # 0.3 0.3 - 1.0 K/uL    Eos # 0.0 0.0 - 0.5 K/uL    Baso # 0.01 0.00 - 0.20 K/uL    Gran% 80.3 (H) 38.0 - 73.0 %    Lymph% 14.6 (L) 18.0 - 48.0 %    Mono% 4.1 4.0 - 15.0 %    Eosinophil% 0.6 0.0 - 8.0 %    Basophil% 0.1 0.0 - 1.9 %    Differential Method Automated    Lipid panel    Collection Time: 17  5:00 AM   Result Value Ref Range    Cholesterol 68 (L) 120 - 199 mg/dL    Triglycerides 69 30 - 150 mg/dL    HDL 36 (L) 40 - 75 mg/dL    LDL Cholesterol 18.2 (L) 63.0 - 159.0 mg/dL    HDL/Chol Ratio 52.9 (H) 20.0 - 50.0 %    Total Cholesterol/HDL Ratio 1.9 (L) 2.0 - 5.0    Non-HDL Cholesterol 32 mg/dL         Significant ImaginD echo with doppler:   1 - Low normal to mildly depressed left ventricular systolic function (EF 50-55%).     2 - Biatrial enlargement.     3 - Eccentric hypertrophy.     4 - Wall motion abnormalities.     5 - Mild left ventricular enlargement.     6 - Severe mitral regurgitation.     7 - Right ventricular enlargement with mildly depressed systolic function.     8 - Pulmonary hypertension. The estimated PA systolic pressure is 59 mmHg.     9 - Severe tricuspid regurgitation.     10 - Increased central venous pressure.     Assessment/Plan:      * NSTEMI (non-ST elevated myocardial infarction)    - Patient presents with 1 day history of chest tightness, EKG with ST depression in inferior  leads, and a troponin of 5.   - Patient initially refused angiogram as it may put him at risk for dialysis, which he did  not want.  The risk of death due to large myocardial infarctions was explained to Mr. Maria, however he still deferred angiogram and PCI for medical management.   - Patient informed us today that he would now like to have an angiogram even though it may cause him to need dialysis.  - Consulted cardiology to discuss patient's options, as he is a complicated case given his severity of ESRD and would likely need dialysis prior to receiving angiogram.  - Will need to coordinate care between nephrology and cardiology.   - Continue ASA 81 mg QD, ticagrelor 90 mg BID, and heparin gtt x 48 hours.  - Metoprolol succinate 50 mg QD  - Rosuvastatin 10 mg QD (renally dosed).   - EKG 9/26 after chest pain resolved showed resolution of ST and T wave changed with 1st degree AV block.  - Troponins peaked at 10 over night and have trended down to 7 today.             Chronic systolic heart failure    - Patient had an echo on 9/8/17 which showed an EF of 50%  - Presented to the ED yesterday with a BNP of 3900 and pulmonary edema on chest x-ray in the setting of NSTEMI.  - 2D echo yesterday showed EF 50%, elevated PA pressures, and mild RV systolic dysfunction.   - Patient complained of SOB this afternoon and appeared volume overloaded on exam.  - Will start lasix 80 IV BID x 4 doses.   - Metoprolol succinate 50 mg QD.           Gastroesophageal reflux disease without esophagitis    - Protonix 40 mg QD.           BPH (benign prostatic hyperplasia)    - Continue home flomax 0.4 mg QD.  - Finasteride 5 mg QD.            Gout    - Continue home allopurinol 100 mg QD.           Acquired hypothyroidism    - Continue home synthroid 25 mcg QD.           Anemia of chronic renal failure, stage 5    - Patient received Epo injections every 14 days as an outpatient.  - Hgb currently stable at 10.8  - Will continue to monitor  daily CBC.           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 Up from baseline of 3.9  - Could represent gradual worsening of CKD as patient refuses dialysis or 2/2 cardiorenal syndrome given NSTEMI and worsening CHF.   - Continue medical management of HTN, CHF, and NSTEMI.  - Sevelamer 1600 mg PO QD with meals.  - Sodium bicarb 650 mg PO QD.   - Edd continue to monitor renal function.           HLD (hyperlipidemia)    - rosuvastatin 10 mg QD (renally dosed).           Essential hypertension    - isosorbide mononitrate 120 mg PO QD.   - hydralazine 75 mg PO TID.  - metoprolol succinate 50 mg QD.         CAD (coronary artery disease), native coronary artery    - ASA 81 mg QD.  - Rosuvastatin 10 mg QD (renally dosed).             VTE Risk Mitigation         Ordered     High Risk of VTE  Once      09/26/17 0001     Reason for No Pharmacological VTE Prophylaxis  Once      09/26/17 0001              Michael Yanez MD PGY-1   Department of Hospital Medicine   [facility]

## 2017-09-27 NOTE — PROGRESS NOTES
Pt c/o SOB, denies chest pain. Vitals taken: O2= 96-97% on 2LNC, HR= 70, RR=18, VG=359/69. Pt requesting PO xanax. Primary team paged, awaiting call return.    Will continue to monitor.     1400: pt still c/o SOB, denies chest pain. HOB elevated. Pt requesting something for anxiety. Primary team paged again, awaiting call return.

## 2017-09-27 NOTE — PLAN OF CARE
"CM to bedside - pt & spouse present; both provided assessment info. Pt w/ DME in place, lives w/ spouse. Pt will likely d/c home w/ h/h but is pending therapy recs & cardiology procedure; plan B is SNF.    CM provided patient anticipated TANIA which will be update by nursing staff. Patient provided a Blue "My Health Packet" for d/c planning and health tool. Patient verbalized understanding.    Future Appointments  Date Time Provider Department [place]   10/9/2017 10:15 AM Jayleen Fung MD [facility]        09/27/17 6649   Discharge Assessment   Assessment Type Discharge Planning Assessment   Confirmed/corrected address and phone number on facesheet? Yes   Assessment information obtained from? Patient;Caregiver   Expected Length of Stay (days) 5   Communicated expected length of stay with patient/caregiver yes   Prior to hospitilization cognitive status: Alert/Oriented   Prior to hospitalization functional status: Assistive Equipment   Current cognitive status: Alert/Oriented   Current Functional Status: Assistive Equipment   Facility Arrived From: via [facility]   Lives With spouse   Able to Return to Prior Arrangements unable to determine at this time (comments)   Is patient able to care for self after discharge? Unable to determine at this time (comments)   Who are your caregiver(s) and their phone number(s)? spouse - Yulisa [phone]   Patient's perception of discharge disposition home health   Readmission Within The Last 30 Days unable to assess   Patient currently being followed by outpatient case management? No   Patient currently receives any other outside agency services? No   Equipment Currently Used at Home cane, straight;walker, rolling;shower chair;glucometer   Do you have any problems affording any of your prescribed medications? No   Is the patient taking medications as prescribed? yes   Does the patient have transportation home? Yes   Transportation Available car   Dialysis Name and Scheduled " days N/A   Does the patient receive services at the [facility]? No   Discharge Plan A Home with family;Home Health   Discharge Plan B Skilled Nursing Facility   Patient/Family In Agreement With Plan yes

## 2017-09-27 NOTE — CONSULTS
[facility]  Cardiology  Consult Note    Patient Name: Jose Maria Jr.  MRN: [id number]  Admission Date: 9/25/2017  Hospital Length of Stay: 1 days  Code Status: DNR   Attending Provider: Dylan Bedolla MD   Consulting Provider: Russell Staples MD  Primary Care Physician: Lamar Felix MD  Principal Problem:NSTEMI (non-ST elevated myocardial infarction)    Patient information was obtained from patient and ER records.     Inpatient consult to Cardiology  Consult performed by: RUSSELL STAPLES  Consult ordered by: RICHMOND AGUILAR        Subjective:     Chief Complaint:  Chest pain/ NSTEMI     HPI:   85 y.o. male with a history significant for CAD s/p CABG (SVG-LAD, SVG-PDA, SVG-OM1), PCI to SVG-OM1 (2015), ICM, CKD IV/V (baseline Cr 4), HTN, HLD presenting with substernal chest tightness and indigestion on 9/25 at 10AM. He was seen in the ED on 9/25, initially with troponin of 0.04, subsequently increased to 5.9-->10.3 peak before downtrending. EKGs with SHAYY in aVR, worsened STD and TWI from baseline in inferolateral leads. He was managed medically per patient preference for NSTEMI given his CKD and aversion to Hd.  He was recently admitted on 9/10 with NSTEMI, troponin peaked at 18. He is a patient of Dr. Marin at [facility]. Managed medically at that time due to his renal function.  He has had a AVF placed in his R arm ~6 weeks ago. Though he initially declined intervention as he was advised that he would likely progress to ESRD/HD. However, he now reports wanting intervention and willingness to undergo Hd.  An echo showed EF 50-55%, severely hypokinetic apical septum, apical lateral wall, mid anterolateral wall. He was started on aspirin, ticagrelor, heparin gtt, metoprolol succinate and rosuvastatin. He has been CP free since the ED on 9/25.    Upon examination today, he reports increased SOB. Endorses orthopnea. No MUMTAZ.         Past Medical History:   Diagnosis Date    Basal cell  carcinoma     BPH (benign prostatic hyperplasia)     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 4/7/2016    Coronary artery disease     Diabetes mellitus     Gout     High cholesterol     Hypertension     Persistent proteinuria 2/5/2017    Renal cyst 2/5/2017    Thyroid disease        Past Surgical History:   Procedure Laterality Date    aaa bypass      GALLBLADDER SURGERY      KNEE SURGERY         Review of patient's allergies indicates:   Allergen Reactions    Benadryl [diphenhydramine hcl] Rash    Versed [midazolam] Rash       No current facility-administered medications on file prior to encounter.      Current Outpatient Prescriptions on File Prior to Encounter   Medication Sig    ACCU-CHEK TANIKA PLUS TEST STRP Strp once daily.     allopurinol (ZYLOPRIM) 100 MG tablet Take 1 tablet (100 mg total) by mouth once daily.    alprazolam (XANAX) 0.25 MG tablet Take 1 tablet (0.25 mg total) by mouth nightly as needed for Anxiety.    aspirin 81 MG Chew Take 81 mg by mouth once daily.    calcitRIOL (ROCALTROL) 0.25 MCG Cap Take 1 capsule (0.25 mcg total) by mouth every Mon, Wed, Fri.    finasteride (PROSCAR) 5 mg tablet Take 5 mg by mouth once daily.    furosemide (LASIX) 40 MG tablet Take 1 tablet (40 mg total) by mouth once daily.    hydrALAZINE (APRESOLINE) 25 MG tablet Take 3 tablets (75 mg total) by mouth 3 (three) times daily.    isosorbide mononitrate (IMDUR) 120 MG 24 hr tablet Take 1 tablet (120 mg total) by mouth once daily.    levothyroxine (SYNTHROID) 25 MCG tablet Take 1 tablet (25 mcg total) by mouth once daily.    metoprolol tartrate (LOPRESSOR) 25 MG tablet Take 0.5 tablets (12.5 mg total) by mouth 2 (two) times daily.    multivitamin capsule Take 1 capsule by mouth once daily.    nitroGLYCERIN (NITROSTAT) 0.4 MG SL tablet Place 1 tablet (0.4 mg total) under the tongue every 5 (five) minutes as needed for Chest pain.    omega-3 acid ethyl esters (LOVAZA) 1 gram capsule Take 1  capsule (1 g total) by mouth 2 (two) times daily.    omeprazole (PRILOSEC) 20 MG capsule Take 1 capsule (20 mg total) by mouth once daily.    rosuvastatin (CRESTOR) 40 MG Tab Take 1 tablet (40 mg total) by mouth every Mon, Wed, Fri.    sevelamer HCl (RENAGEL) 800 MG Tab Take 2 tablets (1,600 mg total) by mouth 3 (three) times daily with meals.    sodium bicarbonate 650 MG tablet Take 1 tablet (650 mg total) by mouth once daily.    tamsulosin (FLOMAX) 0.4 mg Cp24 Take 1 capsule (0.4 mg total) by mouth once daily.    cyanocobalamin 500 mcg tablet Take 2 tablets (1,000 mcg total) by mouth once daily.     Family History     None        Social History Main Topics    Smoking status: Never Smoker    Smokeless tobacco: Never Used    Alcohol use No    Drug use: No    Sexual activity: Not Currently     Review of Systems   Constitution: Negative for fever and malaise/fatigue.   Cardiovascular: Positive for orthopnea. Negative for chest pain, dyspnea on exertion, irregular heartbeat, leg swelling, near-syncope, palpitations, paroxysmal nocturnal dyspnea and syncope.   Respiratory: Positive for shortness of breath. Negative for cough.    Skin: Negative for color change and rash.   Gastrointestinal: Negative for abdominal pain, nausea and vomiting.   Neurological: Negative for dizziness, focal weakness and headaches.     Objective:     Vital Signs (Most Recent):  Temp: 97.9 °F (36.6 °C) (09/27/17 1520)  Pulse: 71 (09/27/17 1520)  Resp: 12 (09/27/17 1520)  BP: 136/62 (09/27/17 1520)  SpO2: 95 % (09/27/17 1520) Vital Signs (24h Range):  Temp:  [97.5 °F (36.4 °C)-97.9 °F (36.6 °C)] 97.9 °F (36.6 °C)  Pulse:  [63-88] 71  Resp:  [12-20] 12  SpO2:  [94 %-98 %] 95 %  BP: (121-136)/(58-68) 136/62     Weight: 73.2 kg (161 lb 6 oz)  Body mass index is 23.83 kg/m².    SpO2: 95 %  O2 Device (Oxygen Therapy): room air      Intake/Output Summary (Last 24 hours) at 09/27/17 1614  Last data filed at 09/27/17 1520   Gross per 24 hour    Intake           712.26 ml   Output              650 ml   Net            62.26 ml       Lines/Drains/Airways     Drain                 Hemodialysis AV Fistula Right upper arm -- days          Peripheral Intravenous Line                 Peripheral IV - Single Lumen 09/25/17 2123 Left Forearm 1 day         Peripheral IV - Single Lumen 09/25/17 2124 Left Forearm 1 day                Physical Exam   Constitutional: He is oriented to person, place, and time. No distress.   HENT:   Head: Atraumatic.   Eyes: EOM are normal. No scleral icterus.   Neck: Neck supple. JVD present.   Cardiovascular: Normal rate and regular rhythm.  Exam reveals no gallop and no friction rub.    No murmur heard.  Pulmonary/Chest: He has no wheezes. He has rales.   Bibasilar rales, mildly increased WOB   Abdominal: Soft. Bowel sounds are normal. He exhibits no distension. There is no tenderness.   Musculoskeletal: He exhibits edema (2+ to under midshin).   Neurological: He is alert and oriented to person, place, and time. No cranial nerve deficit.   Skin: Skin is warm and dry. No erythema.   Psychiatric: He has a normal mood and affect.       Significant Labs:   CMP   Recent Labs  Lab 09/25/17 2135 09/26/17 0623 09/27/17  0500    142 138   K 4.1 4.8 4.5    111* 108   CO2 18* 19* 20*   * 167* 120*   BUN 64* 66* 77*   CREATININE 4.6* 4.7* 5.1*   CALCIUM 9.5 9.1 8.7   PROT 6.8 6.2 5.8*   ALBUMIN 3.3* 3.0* 2.9*   BILITOT 0.4 0.4 0.3   ALKPHOS 74 62 54*   AST 13 25 20   ALT 26 23 21   ANIONGAP 13 12 10   ESTGFRAFRICA 12.5* 12.2* 11.0*   EGFRNONAA 10.8* 10.5* 9.5*   , CBC   Recent Labs  Lab 09/26/17  0623 09/26/17  0840 09/27/17  0500   WBC 7.85 8.42 7.26   HGB 10.0* 10.8* 9.3*   HCT 31.9* 33.1* 29.3*    194 158   , Lipid Panel   Recent Labs  Lab 09/27/17  0500   CHOL 68*   HDL 36*   LDLCALC 18.2*   TRIG 69   CHOLHDL 52.9*    and Troponin   Recent Labs  Lab 09/26/17  1843 09/27/17  0018 09/27/17  0500   TROPONINI 10.118*  8.200* 7.303*       Significant Imaging: Echocardiogram:   2D echo with color flow doppler:   Results for orders placed or performed during the hospital encounter of 09/25/17   2D echo with color flow doppler   Result Value Ref Range    EF 50 55 - 65    Mitral Valve Regurgitation SEVERE (A)     Est. PA Systolic Pressure 59.36 (A)     Tricuspid Valve Regurgitation SEVERE (A)      Assessment and Plan:     * NSTEMI (non-ST elevated myocardial infarction)    -Troponin peaked 10. TTE EF 50-55%, apical septum, apical lateral wall, mid anterolateral wall hypokinesis  -EKG on admit with worsening inferolateral STD and TWI, aVR elevated; now resolved back to baseline and CP free   -Continue medical management with ASA/ticagrelor/toprol/renally dosed crestor/heparin gtt  -Currently appears volume overloaded, starting diuresis as per primary  -Previously chose medical therapy as above due to desire to avoid HD though has new AVF  -Agree with Nephrology involvement  -Pt now amenable to intervention and willing to undergo Hd. Pt with 2 NSTEMIs in last two weeks, given likelihood of recurrent ischemic events, reasonable to pursue intervention at this time  -Will discuss with Interventional Cardiology     * Acute Decompensated Heart Failure  -Volume overloaded on exam  -Starting Lasix 80 IV  -Check UOP, if no response may require high doses due to CKD, would increase dose as needed until response       Thank you for your consult. Will continue to follow along. Seen and discussed with Dr. Sotelo.    Russell Pennington MD  Cardiology PGY4  [phone]

## 2017-09-27 NOTE — SUBJECTIVE & OBJECTIVE
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 4/7/2016    Coronary artery disease     Diabetes mellitus     Gout     High cholesterol     Hypertension     Persistent proteinuria 2/5/2017    Renal cyst 2/5/2017    Thyroid disease        Past Surgical History:   Procedure Laterality Date    aaa bypass      GALLBLADDER SURGERY      KNEE SURGERY         Review of patient's allergies indicates:   Allergen Reactions    Benadryl [diphenhydramine hcl] Rash    Versed [midazolam] Rash       No current facility-administered medications on file prior to encounter.      Current Outpatient Prescriptions on File Prior to Encounter   Medication Sig    ACCU-CHEK TANIKA PLUS TEST STRP Strp once daily.     allopurinol (ZYLOPRIM) 100 MG tablet Take 1 tablet (100 mg total) by mouth once daily.    alprazolam (XANAX) 0.25 MG tablet Take 1 tablet (0.25 mg total) by mouth nightly as needed for Anxiety.    aspirin 81 MG Chew Take 81 mg by mouth once daily.    calcitRIOL (ROCALTROL) 0.25 MCG Cap Take 1 capsule (0.25 mcg total) by mouth every Mon, Wed, Fri.    finasteride (PROSCAR) 5 mg tablet Take 5 mg by mouth once daily.    furosemide (LASIX) 40 MG tablet Take 1 tablet (40 mg total) by mouth once daily.    hydrALAZINE (APRESOLINE) 25 MG tablet Take 3 tablets (75 mg total) by mouth 3 (three) times daily.    isosorbide mononitrate (IMDUR) 120 MG 24 hr tablet Take 1 tablet (120 mg total) by mouth once daily.    levothyroxine (SYNTHROID) 25 MCG tablet Take 1 tablet (25 mcg total) by mouth once daily.    metoprolol tartrate (LOPRESSOR) 25 MG tablet Take 0.5 tablets (12.5 mg total) by mouth 2 (two) times daily.    multivitamin capsule Take 1 capsule by mouth once daily.    nitroGLYCERIN (NITROSTAT) 0.4 MG SL tablet Place 1 tablet (0.4 mg total) under the tongue every 5 (five) minutes as needed for Chest pain.    omega-3  acid ethyl esters (LOVAZA) 1 gram capsule Take 1 capsule (1 g total) by mouth 2 (two) times daily.    omeprazole (PRILOSEC) 20 MG capsule Take 1 capsule (20 mg total) by mouth once daily.    rosuvastatin (CRESTOR) 40 MG Tab Take 1 tablet (40 mg total) by mouth every Mon, Wed, Fri.    sevelamer HCl (RENAGEL) 800 MG Tab Take 2 tablets (1,600 mg total) by mouth 3 (three) times daily with meals.    sodium bicarbonate 650 MG tablet Take 1 tablet (650 mg total) by mouth once daily.    tamsulosin (FLOMAX) 0.4 mg Cp24 Take 1 capsule (0.4 mg total) by mouth once daily.    cyanocobalamin 500 mcg tablet Take 2 tablets (1,000 mcg total) by mouth once daily.     Family History     None        Social History Main Topics    Smoking status: Never Smoker    Smokeless tobacco: Never Used    Alcohol use No    Drug use: No    Sexual activity: Not Currently     Review of Systems   Constitution: Negative for fever and malaise/fatigue.   Cardiovascular: Positive for orthopnea. Negative for chest pain, dyspnea on exertion, irregular heartbeat, leg swelling, near-syncope, palpitations, paroxysmal nocturnal dyspnea and syncope.   Respiratory: Positive for shortness of breath. Negative for cough.    Skin: Negative for color change and rash.   Gastrointestinal: Negative for abdominal pain, nausea and vomiting.   Neurological: Negative for dizziness, focal weakness and headaches.     Objective:     Vital Signs (Most Recent):  Temp: 97.9 °F (36.6 °C) (09/27/17 1520)  Pulse: 71 (09/27/17 1520)  Resp: 12 (09/27/17 1520)  BP: 136/62 (09/27/17 1520)  SpO2: 95 % (09/27/17 1520) Vital Signs (24h Range):  Temp:  [97.5 °F (36.4 °C)-97.9 °F (36.6 °C)] 97.9 °F (36.6 °C)  Pulse:  [63-88] 71  Resp:  [12-20] 12  SpO2:  [94 %-98 %] 95 %  BP: (121-136)/(58-68) 136/62     Weight: 73.2 kg (161 lb 6 oz)  Body mass index is 23.83 kg/m².    SpO2: 95 %  O2 Device (Oxygen Therapy): room air      Intake/Output Summary (Last 24 hours) at 09/27/17 3996  Last  data filed at 09/27/17 1520   Gross per 24 hour   Intake           712.26 ml   Output              650 ml   Net            62.26 ml       Lines/Drains/Airways     Drain                 Hemodialysis AV Fistula Right upper arm -- days          Peripheral Intravenous Line                 Peripheral IV - Single Lumen 09/25/17 2123 Left Forearm 1 day         Peripheral IV - Single Lumen 09/25/17 2124 Left Forearm 1 day                Physical Exam   Constitutional: He is oriented to person, place, and time. No distress.   HENT:   Head: Atraumatic.   Eyes: EOM are normal. No scleral icterus.   Neck: Neck supple. JVD present.   Cardiovascular: Normal rate and regular rhythm.  Exam reveals no gallop and no friction rub.    No murmur heard.  Pulmonary/Chest: He has no wheezes. He has rales.   Bibasilar rales, mildly increased WOB   Abdominal: Soft. Bowel sounds are normal. He exhibits no distension. There is no tenderness.   Musculoskeletal: He exhibits edema (2+ to under midshin).   Neurological: He is alert and oriented to person, place, and time. No cranial nerve deficit.   Skin: Skin is warm and dry. No erythema.   Psychiatric: He has a normal mood and affect.       Significant Labs:   CMP   Recent Labs  Lab 09/25/17 2135 09/26/17  0623 09/27/17  0500    142 138   K 4.1 4.8 4.5    111* 108   CO2 18* 19* 20*   * 167* 120*   BUN 64* 66* 77*   CREATININE 4.6* 4.7* 5.1*   CALCIUM 9.5 9.1 8.7   PROT 6.8 6.2 5.8*   ALBUMIN 3.3* 3.0* 2.9*   BILITOT 0.4 0.4 0.3   ALKPHOS 74 62 54*   AST 13 25 20   ALT 26 23 21   ANIONGAP 13 12 10   ESTGFRAFRICA 12.5* 12.2* 11.0*   EGFRNONAA 10.8* 10.5* 9.5*   , CBC   Recent Labs  Lab 09/26/17  0623 09/26/17  0840 09/27/17  0500   WBC 7.85 8.42 7.26   HGB 10.0* 10.8* 9.3*   HCT 31.9* 33.1* 29.3*    194 158   , Lipid Panel   Recent Labs  Lab 09/27/17  0500   CHOL 68*   HDL 36*   LDLCALC 18.2*   TRIG 69   CHOLHDL 52.9*    and Troponin   Recent Labs  Lab 09/26/17  2563  09/27/17  0018 09/27/17  0500   TROPONINI 10.118* 8.200* 7.303*       Significant Imaging: Echocardiogram:   2D echo with color flow doppler:   Results for orders placed or performed during the hospital encounter of 09/25/17   2D echo with color flow doppler   Result Value Ref Range    EF 50 55 - 65    Mitral Valve Regurgitation SEVERE (A)     Est. PA Systolic Pressure 59.36 (A)     Tricuspid Valve Regurgitation SEVERE (A)

## 2017-09-27 NOTE — ASSESSMENT & PLAN NOTE
- Patient had an echo on 9/8/17 which showed an EF of 50%  - Presented to the ED yesterday with a BNP of 3900 and pulmonary edema on chest x-ray in the setting of NSTEMI.  - 2D echo yesterday showed EF 50%, elevated PA pressures, and mild RV systolic dysfunction.   - Patient complained of SOB this afternoon and appeared volume overloaded on exam.  - Will start lasix 80 IV BID x 4 doses.   - Metoprolol succinate 50 mg QD.

## 2017-09-28 ENCOUNTER — SURGERY (OUTPATIENT)
Age: 82
End: 2017-09-28

## 2017-09-28 PROBLEM — I25.709 CORONARY ARTERY DISEASE INVOLVING CORONARY BYPASS GRAFT OF NATIVE HEART WITH ANGINA PECTORIS: Status: ACTIVE | Noted: 2017-09-28

## 2017-09-28 LAB
ALBUMIN SERPL BCP-MCNC: 2.8 G/DL
ALP SERPL-CCNC: 50 U/L
ALT SERPL W/O P-5'-P-CCNC: 15 U/L
ANION GAP SERPL CALC-SCNC: 12 MMOL/L
AST SERPL-CCNC: 11 U/L
BILIRUB SERPL-MCNC: 0.4 MG/DL
BUN SERPL-MCNC: 85 MG/DL
CALCIUM SERPL-MCNC: 8.6 MG/DL
CHLORIDE SERPL-SCNC: 106 MMOL/L
CO2 SERPL-SCNC: 21 MMOL/L
CREAT SERPL-MCNC: 5.4 MG/DL
DIASTOLIC DYSFUNCTION: NO
EST. GFR  (AFRICAN AMERICAN): 10.3 ML/MIN/1.73 M^2
EST. GFR  (NON AFRICAN AMERICAN): 8.9 ML/MIN/1.73 M^2
FACT X PPP CHRO-ACNC: 0.36 IU/ML
GLUCOSE SERPL-MCNC: 97 MG/DL
MAGNESIUM SERPL-MCNC: 2 MG/DL
PHOSPHATE SERPL-MCNC: 4.5 MG/DL
POTASSIUM SERPL-SCNC: 4.2 MMOL/L
PROT SERPL-MCNC: 5.6 G/DL
SODIUM SERPL-SCNC: 139 MMOL/L

## 2017-09-28 PROCEDURE — 80053 COMPREHEN METABOLIC PANEL: CPT

## 2017-09-28 PROCEDURE — 36415 COLL VENOUS BLD VENIPUNCTURE: CPT

## 2017-09-28 PROCEDURE — 99223 1ST HOSP IP/OBS HIGH 75: CPT | Mod: GC,,, | Performed by: INTERNAL MEDICINE

## 2017-09-28 PROCEDURE — 99223 1ST HOSP IP/OBS HIGH 75: CPT | Mod: ,,, | Performed by: INTERNAL MEDICINE

## 2017-09-28 PROCEDURE — 84100 ASSAY OF PHOSPHORUS: CPT

## 2017-09-28 PROCEDURE — 99232 SBSQ HOSP IP/OBS MODERATE 35: CPT | Mod: GC,,, | Performed by: INTERNAL MEDICINE

## 2017-09-28 PROCEDURE — 83735 ASSAY OF MAGNESIUM: CPT

## 2017-09-28 PROCEDURE — 63600175 PHARM REV CODE 636 W HCPCS: Performed by: INTERNAL MEDICINE

## 2017-09-28 PROCEDURE — 20600001 HC STEP DOWN PRIVATE ROOM

## 2017-09-28 PROCEDURE — 85520 HEPARIN ASSAY: CPT

## 2017-09-28 PROCEDURE — A4216 STERILE WATER/SALINE, 10 ML: HCPCS | Performed by: STUDENT IN AN ORGANIZED HEALTH CARE EDUCATION/TRAINING PROGRAM

## 2017-09-28 PROCEDURE — 25000003 PHARM REV CODE 250: Performed by: STUDENT IN AN ORGANIZED HEALTH CARE EDUCATION/TRAINING PROGRAM

## 2017-09-28 PROCEDURE — 25000003 PHARM REV CODE 250: Performed by: INTERNAL MEDICINE

## 2017-09-28 RX ORDER — SODIUM CHLORIDE 9 MG/ML
INJECTION, SOLUTION INTRAVENOUS ONCE
Status: CANCELLED | OUTPATIENT
Start: 2017-09-28 | End: 2017-09-28

## 2017-09-28 RX ORDER — METOPROLOL SUCCINATE 100 MG/1
100 TABLET, EXTENDED RELEASE ORAL DAILY
Status: DISCONTINUED | OUTPATIENT
Start: 2017-09-29 | End: 2017-09-30 | Stop reason: HOSPADM

## 2017-09-28 RX ADMIN — TAMSULOSIN HYDROCHLORIDE 0.4 MG: 0.4 CAPSULE ORAL at 08:09

## 2017-09-28 RX ADMIN — SEVELAMER CARBONATE 1600 MG: 800 TABLET, FILM COATED ORAL at 04:09

## 2017-09-28 RX ADMIN — HYDRALAZINE HYDROCHLORIDE 75 MG: 50 TABLET ORAL at 06:09

## 2017-09-28 RX ADMIN — ASPIRIN 81 MG CHEWABLE TABLET 81 MG: 81 TABLET CHEWABLE at 08:09

## 2017-09-28 RX ADMIN — ISOSORBIDE MONONITRATE 120 MG: 30 TABLET, EXTENDED RELEASE ORAL at 08:09

## 2017-09-28 RX ADMIN — HYDRALAZINE HYDROCHLORIDE 75 MG: 50 TABLET ORAL at 02:09

## 2017-09-28 RX ADMIN — METOPROLOL SUCCINATE 50 MG: 25 TABLET, EXTENDED RELEASE ORAL at 08:09

## 2017-09-28 RX ADMIN — OMEGA-3-ACID ETHYL ESTERS 1 G: 1 CAPSULE, LIQUID FILLED ORAL at 08:09

## 2017-09-28 RX ADMIN — TICAGRELOR 90 MG: 90 TABLET ORAL at 08:09

## 2017-09-28 RX ADMIN — HYDRALAZINE HYDROCHLORIDE 75 MG: 50 TABLET ORAL at 08:09

## 2017-09-28 RX ADMIN — SODIUM BICARBONATE 650 MG TABLET 650 MG: at 08:09

## 2017-09-28 RX ADMIN — FUROSEMIDE 80 MG: 10 INJECTION, SOLUTION INTRAVENOUS at 05:09

## 2017-09-28 RX ADMIN — Medication 3 ML: at 06:09

## 2017-09-28 RX ADMIN — ROSUVASTATIN CALCIUM 10 MG: 5 TABLET ORAL at 08:09

## 2017-09-28 RX ADMIN — FINASTERIDE 5 MG: 5 TABLET, FILM COATED ORAL at 08:09

## 2017-09-28 RX ADMIN — FUROSEMIDE 80 MG: 10 INJECTION, SOLUTION INTRAVENOUS at 08:09

## 2017-09-28 RX ADMIN — Medication 3 ML: at 02:09

## 2017-09-28 RX ADMIN — ALLOPURINOL 100 MG: 100 TABLET ORAL at 08:09

## 2017-09-28 RX ADMIN — HEPARIN SODIUM AND DEXTROSE 13 UNITS/KG/HR: 10000; 5 INJECTION INTRAVENOUS at 08:09

## 2017-09-28 RX ADMIN — LEVOTHYROXINE SODIUM 25 MCG: 25 TABLET ORAL at 06:09

## 2017-09-28 RX ADMIN — PANTOPRAZOLE SODIUM 40 MG: 40 TABLET, DELAYED RELEASE ORAL at 08:09

## 2017-09-28 NOTE — PROGRESS NOTES
[facility] Hospital Medicine  Progress Note    Patient Name: Jose Maria Jr.  MRN: [id number]  Patient Class: IP- Inpatient   Admission Date: 9/25/2017  Length of Stay: 2 days  Attending Physician: Dylan Bedolla MD  Primary Care Provider: Lamar Felix MD    [place]ospital Medicine [place] [facility] [place] 4 Nery Soares MD    Subjective:     Principal Problem:NSTEMI (non-ST elevated myocardial infarction)    HPI:  Mr. Maria is a 84 y/o gentleman with PMHx  MI x 2 s/p CABG, DM, CKD4, HTN, HLD, Hypothyroidism, and gout who [place] [facility] [place] [facility] [place] for evaluation of NSTEMI. Yesterday morning, the patient started experiencing generalized chest and abdominal tightness 7/10 in severity.  It was exacerbated by exertion and relieved with rest, and was associated with SOB and a cough productive of clear sputum. He denied having diaphoresis or associated radiation of his chest tightness. He otherwise denies any fevers, chills, n/v/d, or dysuria.     Mr. Maria was recently admitted by Dr. Gorman for NSTEMI, and states his pain yesterday was similar to the pain he had on that admission. He was treated medically at that time due to his renal function. The patient does not want to be on dialysis, and deferred angiogram from cardiology on this admission due to the likelihood of him receiving dialysis s/p angiogram. The risk of death due to large myocardial infarctions was explained to Mr. Maria, however he still deferred angiogram and PCI and opted for medical management.           Hospital Course:  Mr. Maria was admitted to Hospital Medicine for NSTEMI (troponin of 5) as he deferred angiogram from cardiology. Patient was started on aspirin, ticagrelor, heparin gtt, metoprolol succinate and rosuvastatin. Troponins peaked at 10 over night and began to trend downward today. He denied having any further episodes of chest pain, however on 9/27 the patient complained of SOB and and appeared  volume overloaded. He was given lasix 80 IV BID x 4 doses.   9/28: Patient has decided to pursue possible angiogram. Consulted cardiology who feels cath is warranted and agrees with consult to interventional cardiology and nephrology. Interventional cardiology wants a stress test before proceeding with angiogram given concern for putting patient on dialysis.     Interval History:   Patient has decided to pursue possible angiogram. Consulted cardiology who feels cath is warranted and agrees with consult to interventional cardiology and nephrology. Interventional cardiology wants a stress test before proceeding with angiogram given concern for putting patient on dialysis. SOB improved with diuresis though renal function continues to decline.     Review of Systems   Constitutional: Negative for chills, diaphoresis and fever.   HENT: Negative for congestion and sore throat.    Eyes: Negative for photophobia and visual disturbance.   Respiratory: Negative for cough, chest tightness and shortness of breath.    Cardiovascular: Negative for chest pain and palpitations.   Gastrointestinal: Negative for abdominal pain, nausea and vomiting.   Genitourinary: Negative for dysuria and hematuria.   Musculoskeletal: Negative for arthralgias and myalgias.   Skin: Negative for color change and rash.   Neurological: Negative for light-headedness and headaches.     Objective:     Vital Signs (Most Recent):  Temp: 97.8 °F (36.6 °C) (09/28/17 0747)  Pulse: 66 (09/28/17 0747)  Resp: 17 (09/28/17 0747)  BP: 136/61 (09/28/17 0747)  SpO2: 96 % (09/28/17 0747) Vital Signs (24h Range):  Temp:  [97.8 °F (36.6 °C)-98.1 °F (36.7 °C)] 97.8 °F (36.6 °C)  Pulse:  [58-93] 66  Resp:  [12-18] 17  SpO2:  [94 %-98 %] 96 %  BP: (121-136)/(58-62) 136/61     Weight: 74.6 kg (164 lb 7.4 oz)  Body mass index is 24.29 kg/m².    Intake/Output Summary (Last 24 hours) at 09/28/17 0959  Last data filed at 09/28/17 0900   Gross per 24 hour   Intake              854  ml   Output             2150 ml   Net            -1296 ml      Physical Exam   Constitutional: He is oriented to person, place, and time. He appears well-developed and well-nourished. No distress.   HENT:   Head: Normocephalic and atraumatic.   Mouth/Throat: Oropharynx is clear and moist.   Eyes: EOM are normal. Pupils are equal, round, and reactive to light.   Neck: Neck supple. JVD present.   Cardiovascular: Normal rate, regular rhythm and intact distal pulses.    Murmur heard.  Pulmonary/Chest: Effort normal. No respiratory distress. He has no wheezes. He has rales.   Abdominal: Soft. Bowel sounds are normal. He exhibits no distension. There is no tenderness. There is no guarding.   Musculoskeletal: Normal range of motion.   Bilateral lower extremity 1+ pitting edema up to shins.    Neurological: He is alert and oriented to person, place, and time.   Skin: Skin is warm and dry. Capillary refill takes less than 2 seconds. He is not diaphoretic.       Significant Labs: All pertinent labs within the past 24 hours have been reviewed.    Significant Imaging: I have reviewed all pertinent imaging results/findings within the past 24 hours.    Assessment/Plan:      * NSTEMI (non-ST elevated myocardial infarction)    - Patient presents with 1 day history of chest tightness, EKG with ST depression in inferior leads, and a troponin of 5.   - Patient initially refused angiogram as it may put him at risk for dialysis, which he did  not want.  The risk of death due to large myocardial infarctions was explained to Mr. Maria, however he still deferred angiogram and PCI for medical management. Currently patient now wanting angiogram- pending SPECT stress test as per interventional cardiology and nephro consult  - Continue ASA 81 mg QD, ticagrelor 90 mg BID, and heparin gtt x 48 hours to finish today  - Metoprolol succinate 50 mg QD  - Rosuvastatin 10 mg QD (renally dosed).   - EKG 9/26 after chest pain resolved showed resolution  of ST and T wave changed with 1st degree AV block.  - Troponins peaked at 10   - Will need to coordinate care between nephrology and cardiology.            Chronic systolic heart failure    - Patient had an echo on 9/8/17 which showed an EF of 50%  - Presented to the ED yesterday with a BNP of 3900 and pulmonary edema on chest x-ray in the setting of NSTEMI.  - 2D echo yesterday showed EF 50%, elevated PA pressures, and mild RV systolic dysfunction.   - Patient complained of SOB this afternoon and appeared volume overloaded on exam.  - Will start lasix 80 IV BID x 4 doses.   - Metoprolol succinate 50 mg QD.           Gastroesophageal reflux disease without esophagitis    - Protonix 40 mg QD.           BPH (benign prostatic hyperplasia)    - Continue home flomax 0.4 mg QD.  - Finasteride 5 mg QD.            Gout    - Continue home allopurinol 100 mg QD.           Acquired hypothyroidism    - Continue home synthroid 25 mcg QD.           Anemia of chronic renal failure, stage 5    - Patient received Epo injections every 14 days as an outpatient.  - Hgb currently stable at 10.8  - Will continue to monitor daily CBC.           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 Up from baseline of 3.9  - Could represent gradual worsening of CKD as patient refuses dialysis or 2/2 cardiorenal syndrome given NSTEMI and worsening CHF.   - Continue medical management of HTN, CHF, and NSTEMI.  - Sevelamer 1600 mg PO QD with meals.  - Sodium bicarb 650 mg PO QD.   - Edd continue to monitor renal function.   - Pending renal consult          HLD (hyperlipidemia)    - rosuvastatin 10 mg QD (renally dosed).           Essential hypertension    - isosorbide mononitrate 120 mg PO QD.   - hydralazine 75 mg PO TID.  - metoprolol succinate 50 mg QD.         CAD (coronary artery disease), native coronary artery    - ASA 81 mg QD.  - Rosuvastatin 10 mg QD (renally dosed).             VTE Risk Mitigation          Ordered     High Risk of VTE  Once      09/26/17 0001     Reason for No Pharmacological VTE Prophylaxis  Once      09/26/17 0001              Nery Soares MD  Department of Hospital Medicine   [facility]

## 2017-09-28 NOTE — PROGRESS NOTES
Pt left unit via stretcher for stress test. No distress noted on departure. Hep gtt infusing and 2L NC.

## 2017-09-28 NOTE — HPI
Mr. Maria is a 86 Yo M with a PMHx of CABG, 2previous MIs, ICM, CKD4/5 (baseline Cr 3.9), HTN, HLD who was transferred from [place] [facility] for further management of an NSTEMI.    He began having substernal chest tightness and indigestion on 9/25 at 10AM. He was seen in the ED on 9/25, initially with troponin of 0.04, subsequently increased to 5.9-->10.3 peak before downtrending. EKGs with SHAYY in aVR, worsened STD and TWI from baseline in inferolateral leads. He was managed medically per patient preference for NSTEMI given his CKD and aversion to HD. An echo showed EF 50-55%, severely hypokinetic apical septum, apical lateral wall, mid anterolateral wall. He was started on aspirin, ticagrelor, heparin gtt, metoprolol succinate and rosuvastatin. He has been CP free since the ED on 9/25.     He was subsequently admitted to Hospital Medicine.  Last night he began to have SOB and decided that he did want an angiogram, even if this meant he may be on HD. He has had a AVF placed in his R arm 8/2017 for worsening renal functions.  His baseline Cr is 3.9. He follows with Dr. AMELIE Fung at [facility] in [place].     Of note, he had a recent admission with an NSTEMI  on 9/10 with NSTEMI, troponin peaked at 18. He is a patient of Dr. Marin at [facility]. Managed medically at that time due to his renal function.

## 2017-09-28 NOTE — PLAN OF CARE
Problem: Patient Care Overview  Goal: Plan of Care Review  Outcome: Ongoing (interventions implemented as appropriate)    Pt AAOx4 with wife at the bedside.  Pt wife is attentive to and supportive of pt and actively involved in pt care.   Pt is currently a DNR.    VSS  Pt on tele running sinus gabriela to SR     Heparin gtt @  13 Units/kg/hr × 73.3 kg (  9.5 mL/hr  )  AntiXA this AM was therapeutic.    Pt being diuresed with IV lasix 80mg BID.    Plan for angiogram today.  Interventional cardiology and nephrology consulted.    Pt remained free from falls or injury thus far.   Bed is in low/ locked position, side rails are up x 2, call light is in reach.   Will continue to monitor.

## 2017-09-28 NOTE — ASSESSMENT & PLAN NOTE
This is a 84 yo M with baseline CKD5 (Cr of 3.9, eGFR of 10) who [place] [facility] [place] of NSTEMI initially not wanting an angiogram but has since elected to get the procedure.  Nephrology was consulted for the use of contrast in a CKD5 patient.  -currently producing adequate urine  -BUN/Cr continue to trend up  -continue NaHCO daily and Sevelamer 1600mg  -Cardiac stress test indicates    PLAN:  -50cc/hr for 6 hours before and 50cc/hr for 6 hours after angiogram  -give 1200 of Mucomyst 12 hours before angiogram  -avoid nephrotoxic agents, monitor ins/outs

## 2017-09-28 NOTE — CONSULTS
Consult received; please see H&P for full assessment.    Janell Wells MD  Internal Medicine, PGY2  Pager [phone]

## 2017-09-28 NOTE — ASSESSMENT & PLAN NOTE
- Creatinine continues to trend upwards. Up from baseline of 3.9  - Could represent gradual worsening of CKD as patient refuses dialysis or 2/2 cardiorenal syndrome given NSTEMI and worsening CHF.   - Continue medical management of HTN, CHF, and NSTEMI.  - Sevelamer 1600 mg PO QD with meals.  - Sodium bicarb 650 mg PO QD.   - Edd continue to monitor renal function.   - Pending renal consult

## 2017-09-28 NOTE — CONSULTS
INTERVENTIONAL CARDIOLOGY CONSULT      Referring Physician:  Dylan Bedolla MD  Indication: NSTEMI     HPI:  85 y.o. male with a history significant for CAD s/p CABG (SVG-LAD, SVG-PDA, SVG-OM1), PCI to SVG-Om1 and SVG-PDA (4./2016), ICM, CKD IV/V (baseline Cr 4), HTN, HLD presenting with substernal chest tightness and indigestion on 9/25 at 10AM. He was seen in the ED on 9/25, initially with troponin of 0.04, subsequently increased to 5.9-->10.3 peak before downtrending. EKGs with SHAYY in aVR, worsened STD and TWI from baseline in inferolateral leads. He was managed medically per patient preference for NSTEMI given his CKD and aversion to Hd.  He was recently admitted on 9/10 with NSTEMI, troponin peaked at 18. He is a patient of Dr. Marin at [facility]. Managed medically at that time due to his renal function.  He has had a AVF placed in his R arm ~6 weeks ago. Though he initially declined intervention as he was advised that he would likely progress to ESRD/HD. However, he now reports wanting intervention and willingness to undergo HD.  An echo showed EF 50-55%, severely hypokinetic apical septum, apical lateral wall, mid anterolateral wall. He was started on aspirin, ticagrelor, heparin gtt, metoprolol succinate and rosuvastatin. He has been CP free since the ED on 9/25.      ROS:  Constitution: Negative for fever, chills, weight loss or gain.   HENT: Negative for sore throat, rhinorrhea, or headache.  Eyes: Negative for blurred or double vision.   Cardiovascular: See above  Pulmonary: Positive for SOB   Gastrointestinal: Negative for abdominal pain, nausea, vomiting, or diarrhea.   : Negative for dysuria.   Neurological: Negative for focal weakness or sensory changes.    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 4/7/2016    Coronary artery disease     Diabetes mellitus     Gout     High cholesterol      Hypertension     Persistent proteinuria 2/5/2017    Renal cyst 2/5/2017    Thyroid disease      Past Surgical History:   Procedure Laterality Date    aaa bypass      GALLBLADDER SURGERY      KNEE SURGERY       No family history on file.  Social History   Substance Use Topics    Smoking status: Never Smoker    Smokeless tobacco: Never Used    Alcohol use No     Review of patient's allergies indicates:   Allergen Reactions    Benadryl [diphenhydramine hcl] Rash    Versed [midazolam] Rash      allopurinol  100 mg Oral Daily    aspirin  81 mg Oral Daily    calcitRIOL  0.25 mcg Oral Every Mon, Wed, Fri    finasteride  5 mg Oral Daily    furosemide  80 mg Intravenous BID    hydrALAZINE  75 mg Oral TID    isosorbide mononitrate  120 mg Oral Daily    levothyroxine  25 mcg Oral Daily    metoprolol succinate  50 mg Oral Daily    omega-3 acid ethyl esters  1 g Oral BID    pantoprazole  40 mg Oral Daily    rosuvastatin  10 mg Oral Daily    sevelamer carbonate  1,600 mg Oral TID WM    sodium bicarbonate  650 mg Oral Daily    sodium chloride 0.9%  3 mL Intravenous Q8H    tamsulosin  0.4 mg Oral Daily    ticagrelor  90 mg Oral BID     No current facility-administered medications on file prior to encounter.      Current Outpatient Prescriptions on File Prior to Encounter   Medication Sig Dispense Refill    ACCU-CHEK TANIKA PLUS TEST STRP Strp once daily.       allopurinol (ZYLOPRIM) 100 MG tablet Take 1 tablet (100 mg total) by mouth once daily. 30 tablet 0    alprazolam (XANAX) 0.25 MG tablet Take 1 tablet (0.25 mg total) by mouth nightly as needed for Anxiety. 60 tablet 0    aspirin 81 MG Chew Take 81 mg by mouth once daily.      calcitRIOL (ROCALTROL) 0.25 MCG Cap Take 1 capsule (0.25 mcg total) by mouth every Mon, Wed, Fri. 12 capsule 11    finasteride (PROSCAR) 5 mg tablet Take 5 mg by mouth once daily.      furosemide (LASIX) 40 MG tablet Take 1 tablet (40 mg total) by mouth once daily.  30 tablet 11    hydrALAZINE (APRESOLINE) 25 MG tablet Take 3 tablets (75 mg total) by mouth 3 (three) times daily. 270 tablet 11    isosorbide mononitrate (IMDUR) 120 MG 24 hr tablet Take 1 tablet (120 mg total) by mouth once daily. 30 tablet 11    levothyroxine (SYNTHROID) 25 MCG tablet Take 1 tablet (25 mcg total) by mouth once daily. 90 tablet 3    metoprolol tartrate (LOPRESSOR) 25 MG tablet Take 0.5 tablets (12.5 mg total) by mouth 2 (two) times daily. 30 tablet 11    multivitamin capsule Take 1 capsule by mouth once daily.      nitroGLYCERIN (NITROSTAT) 0.4 MG SL tablet Place 1 tablet (0.4 mg total) under the tongue every 5 (five) minutes as needed for Chest pain. 25 tablet 1    omega-3 acid ethyl esters (LOVAZA) 1 gram capsule Take 1 capsule (1 g total) by mouth 2 (two) times daily. 60 capsule 3    omeprazole (PRILOSEC) 20 MG capsule Take 1 capsule (20 mg total) by mouth once daily. 30 capsule 11    rosuvastatin (CRESTOR) 40 MG Tab Take 1 tablet (40 mg total) by mouth every Mon, Wed, Fri. 36 tablet 3    sevelamer HCl (RENAGEL) 800 MG Tab Take 2 tablets (1,600 mg total) by mouth 3 (three) times daily with meals. 180 tablet 11    sodium bicarbonate 650 MG tablet Take 1 tablet (650 mg total) by mouth once daily. 90 tablet 3    tamsulosin (FLOMAX) 0.4 mg Cp24 Take 1 capsule (0.4 mg total) by mouth once daily. 30 capsule 11    cyanocobalamin 500 mcg tablet Take 2 tablets (1,000 mcg total) by mouth once daily.         Continuous Infusions:   heparin (porcine) in D5W 13 Units/kg/hr (09/27/17 0858)       Scheduled Meds:   allopurinol  100 mg Oral Daily    aspirin  81 mg Oral Daily    calcitRIOL  0.25 mcg Oral Every Mon, Wed, Fri    finasteride  5 mg Oral Daily    furosemide  80 mg Intravenous BID    hydrALAZINE  75 mg Oral TID    isosorbide mononitrate  120 mg Oral Daily    levothyroxine  25 mcg Oral Daily    metoprolol succinate  50 mg Oral Daily    omega-3 acid ethyl esters  1 g Oral BID     pantoprazole  40 mg Oral Daily    rosuvastatin  10 mg Oral Daily    sevelamer carbonate  1,600 mg Oral TID WM    sodium bicarbonate  650 mg Oral Daily    sodium chloride 0.9%  3 mL Intravenous Q8H    tamsulosin  0.4 mg Oral Daily    ticagrelor  90 mg Oral BID     PRN Meds:GI cocktail (mylanta 30 mL, lidocaine 2 % viscous 10 mL, dicyclomine 10 mL) 50 mL, heparin (PORCINE), heparin (PORCINE), HYDROmorphone, nitroGLYCERIN  OBJECTIVE:     Vitals:    09/28/17 0314 09/28/17 0400 09/28/17 0600 09/28/17 0700   BP:       Pulse: 64 (!) 58 (!) 58 93   Resp:       Temp:       TempSrc:       SpO2:       Weight:       Height:         Gen: Lying in bed, no acute distress, 3LNC in place  HEENT: Supple, +JVD  Cvs: Regular rate and rhythm, no murmurs  Resp: Normal work of breathing, bibasilar rales  Abd: Soft, non-tender and not distended  Ext: Warm, no edema  Vascular:   LEFT RIGHT   RADIAL 2+ 2+   BRACHIAL 2+ 2+   FEMORAL 2+ 2+   DP 2+ 2+   TP 2+ 2+   Jorge's Test Normal Normal     Labs:       Recent Labs  Lab 09/26/17  0623 09/27/17  0500 09/28/17  0440    138 139   K 4.8 4.5 4.2   * 108 106   CO2 19* 20* 21*   BUN 66* 77* 85*   CREATININE 4.7* 5.1* 5.4*   * 120* 97   ANIONGAP 12 10 12       Recent Labs  Lab 09/28/17  0440   MG 2.0   PHOS 4.5       Recent Labs  Lab 09/26/17  0623 09/27/17  0500 09/28/17  0440   AST 25 20 11   ALT 23 21 15   ALKPHOS 62 54* 50*   BILITOT 0.4 0.3 0.4   ALBUMIN 3.0* 2.9* 2.8*        Recent Labs  Lab 09/26/17  0623 09/26/17  0840 09/27/17  0500   WBC 7.85 8.42 7.26   HGB 10.0* 10.8* 9.3*   HCT 31.9* 33.1* 29.3*    194 158   GRAN 86.0*  6.8 82.7*  7.0 80.3*  5.8       Recent Labs  Lab 09/25/17 2135   INR 1.1       Recent Labs  Lab 09/25/17  2135  09/26/17  1843 09/27/17  0018 09/27/17  0500   TROPONINI 0.049*  < > 10.118* 8.200* 7.303*   BNP 3,051*  --   --   --   --    < > = values in this interval not displayed.   Micro:   Blood Cultures  Lab Results   Component  Value Date    LABBLOO No growth after 5 days. 01/20/2017    LABBLOO No growth after 5 days. 12/26/2016    LABBLOO No growth after 5 days. 12/26/2016     Urine Cultures  Lab Results   Component Value Date    LABURIN No growth 12/26/2016     IMAGING:   EKGs: Sr, worsening STD and TWI in inferolateral leads, resolved to baseline    TTE:  EF   Date Value Ref Range Status   09/26/2017 50 55 - 65    09/08/2017 50 55 - 65    12/27/2016 45 55 - 65            IMPRESSION:    85 y.o. male with a history significant for CAD s/p CABG (SVG-LAD, SVG-PDA, SVG-OM1), PCI to SVG-OM1 (2015), ICM, CKD V not on HD, HTN, HLD presenting with NSTEMI ~72 hours ago, peak troponon 10. Recent NSTEMI 2 weeks ago, peak 18. Managed medically, now interested in pursuing intervention; however, currently 72 hours out from ischemic insult as well as ADHF and CKDV not yet on HD. Plan for ischemia guided strategy at this time.    PROCEDURAL RISK STRATIFICATION:   LOGAN Score: 5      [place] Predicted PCI Mortality Risk: 4.2%   [place] Predicted MACE Risk:  10.2%  [place] Bleeding Score & Risk: 60, 16.7%   Contrast Nephropathy Post-PCI/Renal Risk Score: 57.7%    Prior Contrast Allergy: none   Sedation   Prior Sedation: yes   History of Obstructive Sleep Apnea/SDB: no   Pertinent Allergies:   Latex: no   ASA: no    Heparin-Induced Thrombocytopenia: no  PLAN:     CAD (coronary artery disease), native coronary artery  NSTEMI (non-ST elevated myocardial infarction)  -Plan for noninvasive stress test. PET unavailable. 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    -The risks of coronary angiography include but are not limited to:  Bleeding, infection, heart rhythm abnormalities, allergic reactions, kidney injury, stroke and death.    -Should stenting be indicated, the patient has agreed to dual anti-platelet therapy for 1-consecutive year with a drug-eluting stent and a minimum of 1-month with the use of a bare metal stent.    -The risks of moderate sedation include hypotension, respiratory depression, arrhythmias, bronchospasm, & death.    -Informed consent was obtained & the patient is agreeable to proceed with the procedure.  -This patient was discussed with the attending interventional cardiologist who agrees with the above assessment & plan.      Russell Pennington MD  Cardiology Fellow  Pager [phone]

## 2017-09-28 NOTE — CONSULTS
[facility]  Nephrology  Consult Note    Patient Name: Jose Maria Jr.  MRN: [id number]  Admission Date: 9/25/2017  Hospital Length of Stay: 2 days  Attending Provider: Dylan Bedolla MD   Primary Care Physician: Lamar Felix MD  Principal Problem:NSTEMI (non-ST elevated myocardial infarction)    Consults  Subjective:     HPI: Mr. 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    He began having substernal chest tightness and indigestion on 9/25 at 10AM. He was seen in the ED on 9/25, initially with troponin of 0.04, subsequently increased to 5.9-->10.3 peak before downtrending. EKGs with SHAYY in aVR, worsened STD and TWI from baseline in inferolateral leads. He was managed medically per patient preference for NSTEMI given his CKD and aversion to HD. An echo showed EF 50-55%, severely hypokinetic apical septum, apical lateral wall, mid anterolateral wall. He was started on aspirin, ticagrelor, heparin gtt, metoprolol succinate and rosuvastatin. He has been CP free since the ED on 9/25.     He was subsequently admitted to Hospital Medicine.  Last night he began to have SOB and decided that he did want an angiogram, even if this meant he may be on HD. He has had a AVF placed in his R arm 8/2017 for worsening renal functions.  His baseline Cr is 3.9. He follows with Dr. AMELIE Fung at [facility] in [place].     Of note, he had a recent admission with an NSTEMI  on 9/10 with NSTEMI, troponin peaked at 18. He is a patient of Dr. Marin at [facility]. Managed medically at that time due to his renal function.        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 4/7/2016    Coronary artery disease     Diabetes mellitus     Gout     High cholesterol      Hypertension     Persistent proteinuria 2/5/2017    Renal cyst 2/5/2017    Thyroid disease        Past Surgical History:   Procedure Laterality Date    aaa bypass      GALLBLADDER SURGERY      KNEE SURGERY         Review of patient's allergies indicates:   Allergen Reactions    Benadryl [diphenhydramine hcl] Rash    Versed [midazolam] Rash     Current Facility-Administered Medications   Medication Frequency    allopurinol tablet 100 mg Daily    aspirin chewable tablet 81 mg Daily    calcitRIOL capsule 0.25 mcg Every Mon, Wed, Fri    finasteride tablet 5 mg Daily    furosemide injection 80 mg BID    GI cocktail (mylanta 30 mL, lidocaine 2 % viscous 10 mL, dicyclomine 10 mL) 50 mL BID PRN    hydrALAZINE tablet 75 mg TID    HYDROmorphone injection 0.5 mg Q4H PRN    isosorbide mononitrate 24 hr tablet 120 mg Daily    levothyroxine tablet 25 mcg Daily    metoprolol succinate (TOPROL-XL) 24 hr tablet 50 mg Daily    nitroGLYCERIN SL tablet 0.4 mg Q5 Min PRN    omega-3 acid ethyl esters capsule 1 g BID    pantoprazole EC tablet 40 mg Daily    rosuvastatin tablet 10 mg Daily    sevelamer carbonate tablet 1,600 mg TID WM    sodium bicarbonate tablet 650 mg Daily    sodium chloride 0.9% flush 3 mL Q8H    tamsulosin 24 hr capsule 0.4 mg Daily    ticagrelor tablet 90 mg BID     Family History     None        Social History Main Topics    Smoking status: Never Smoker    Smokeless tobacco: Never Used    Alcohol use No    Drug use: No    Sexual activity: Not Currently     Review of Systems   Constitutional: Negative for chills, diaphoresis and fever.   HENT: Negative for congestion and sore throat.    Eyes: Negative for photophobia and visual disturbance.   Respiratory: Negative for cough, chest tightness and shortness of breath.    Cardiovascular: Negative for chest pain and palpitations.   Gastrointestinal: Negative for abdominal pain, nausea and vomiting.   Genitourinary: Negative for dysuria and  hematuria.   Musculoskeletal: Negative for arthralgias and myalgias.   Skin: Negative for color change and rash.   Neurological: Negative for light-headedness and headaches.     Objective:     Vital Signs (Most Recent):  Temp: 97.9 °F (36.6 °C) (09/28/17 1240)  Pulse: 64 (09/28/17 1240)  Resp: 18 (09/28/17 1240)  BP: (!) 140/67 (09/28/17 1240)  SpO2: 95 % (09/28/17 1240)  O2 Device (Oxygen Therapy): room air (09/28/17 1240) Vital Signs (24h Range):  Temp:  [97.8 °F (36.6 °C)-98.1 °F (36.7 °C)] 97.9 °F (36.6 °C)  Pulse:  [58-93] 64  Resp:  [12-18] 18  SpO2:  [95 %-98 %] 95 %  BP: (121-140)/(58-67) 140/67     Weight: 74.6 kg (164 lb 7.4 oz) (09/28/17 0747)  Body mass index is 24.29 kg/m².  Body surface area is 1.91 meters squared.    I/O last 3 completed shifts:  In: 1566.3 [P.O.:1280; I.V.:286.3]  Out: 2050 [Urine:2050]    Physical Exam   Constitutional: He is oriented to person, place, and time. He appears well-developed and well-nourished. No distress.   HENT:   Head: Normocephalic and atraumatic.   Mouth/Throat: Oropharynx is clear and moist.   Eyes: EOM are normal. Pupils are equal, round, and reactive to light.   Neck: Neck supple.   Cardiovascular: Normal rate, regular rhythm and intact distal pulses.    Murmur ((?radiation of AVF)) heard.  Pulmonary/Chest: Effort normal. No respiratory distress. He has no wheezes.   Abdominal: Soft. Bowel sounds are normal. He exhibits no distension. There is no tenderness. There is no guarding.   Musculoskeletal: Normal range of motion.   Bilateral lower extremity trace pitting edema up to shins.    Neurological: He is alert and oriented to person, place, and time.   Skin: Skin is warm and dry. Capillary refill takes less than 2 seconds. He is not diaphoretic.       Significant Labs:  CBC:   Recent Labs  Lab 09/27/17  0500   WBC 7.26   RBC 3.01*   HGB 9.3*   HCT 29.3*      MCV 97   MCH 30.9   MCHC 31.7*     CMP:   Recent Labs  Lab 09/28/17  0440   GLU 97   CALCIUM 8.6*    ALBUMIN 2.8*   PROT 5.6*      K 4.2   CO2 21*      BUN 85*   CREATININE 5.4*   ALKPHOS 50*   ALT 15   AST 11   BILITOT 0.4     No results for input(s): COLORU, CLARITYU, SPECGRAV, PHUR, PROTEINUA, GLUCOSEU, BILIRUBINCON, BLOODU, WBCU, RBCU, BACTERIA, MUCUS, NITRITE, LEUKOCYTESUR, UROBILINOGEN, HYALINECASTS in the last 168 hours.    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  Nephrology was consulted for the use of contrast in a CKD5 patient.  -currently producing adequate urine  -BUN/Cr continue to trend up  -continue NaHCO daily and Sevelamer 1600mg  -Cardiac stress test indicates    PLAN:  -50cc/hr for 6 hours before and 50cc/hr for 6 hours after angiogram  -give 1200 of Mucomyst 12 hours before angiogram  -avoid nephrotoxic agents, monitor ins/outs                Thank you for your consult. I will follow-up with patient. Please contact us if you have any additional questions.    Janell Wells MD  Nephrology  [facility]

## 2017-09-28 NOTE — PROGRESS NOTES
Pt returned to unit via stretcher from stress test. Pt denies pain. VSS. No distress noted on return. Will continue to monitor.

## 2017-09-28 NOTE — HPI
Mr. Maria is a 86 y/o gentleman with PMHx  MI x 2 s/p CABG, DM, CKD5, HTN, HLD, Hypothyroidism, and gout who presents to [facility] as a transfer from [facility] [place] for evaluation of NSTEMI. Yesterday morning, the patient started experiencing generalized chest and abdominal tightness 7/10 in severity.  It was exacerbated by exertion and relieved with rest, and was associated with SOB and a cough productive of clear sputum. He denied having diaphoresis or associated radiation of his chest tightness. He otherwise denies any fevers, chills, n/v/d, or dysuria.      Mr. Maria was recently admitted by Dr. Gorman for NSTEMI, and states his pain yesterday was similar to the pain he had on that admission. He was treated medically at that time due to his renal function. The patient does not want to be on dialysis, and deferred angiogram from cardiology on this admission due to the likelihood of him receiving dialysis s/p angiogram. The risk of death due to large myocardial infarctions was explained to Mr. Maria, however he still deferred angiogram and PCI and opted for medical management.

## 2017-09-28 NOTE — SUBJECTIVE & OBJECTIVE
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 4/7/2016    Coronary artery disease     Diabetes mellitus     Gout     High cholesterol     Hypertension     Persistent proteinuria 2/5/2017    Renal cyst 2/5/2017    Thyroid disease        Past Surgical History:   Procedure Laterality Date    aaa bypass      GALLBLADDER SURGERY      KNEE SURGERY         Review of patient's allergies indicates:   Allergen Reactions    Benadryl [diphenhydramine hcl] Rash    Versed [midazolam] Rash     Current Facility-Administered Medications   Medication Frequency    allopurinol tablet 100 mg Daily    aspirin chewable tablet 81 mg Daily    calcitRIOL capsule 0.25 mcg Every Mon, Wed, Fri    finasteride tablet 5 mg Daily    furosemide injection 80 mg BID    GI cocktail (mylanta 30 mL, lidocaine 2 % viscous 10 mL, dicyclomine 10 mL) 50 mL BID PRN    hydrALAZINE tablet 75 mg TID    HYDROmorphone injection 0.5 mg Q4H PRN    isosorbide mononitrate 24 hr tablet 120 mg Daily    levothyroxine tablet 25 mcg Daily    metoprolol succinate (TOPROL-XL) 24 hr tablet 50 mg Daily    nitroGLYCERIN SL tablet 0.4 mg Q5 Min PRN    omega-3 acid ethyl esters capsule 1 g BID    pantoprazole EC tablet 40 mg Daily    rosuvastatin tablet 10 mg Daily    sevelamer carbonate tablet 1,600 mg TID WM    sodium bicarbonate tablet 650 mg Daily    sodium chloride 0.9% flush 3 mL Q8H    tamsulosin 24 hr capsule 0.4 mg Daily    ticagrelor tablet 90 mg BID     Family History     None        Social History Main Topics    Smoking status: Never Smoker    Smokeless tobacco: Never Used    Alcohol use No    Drug use: No    Sexual activity: Not Currently     Review of Systems   Constitutional: Negative for chills, diaphoresis and fever.   HENT: Negative for congestion and sore throat.    Eyes: Negative for photophobia and visual disturbance.    Respiratory: Negative for cough, chest tightness and shortness of breath.    Cardiovascular: Negative for chest pain and palpitations.   Gastrointestinal: Negative for abdominal pain, nausea and vomiting.   Genitourinary: Negative for dysuria and hematuria.   Musculoskeletal: Negative for arthralgias and myalgias.   Skin: Negative for color change and rash.   Neurological: Negative for light-headedness and headaches.     Objective:     Vital Signs (Most Recent):  Temp: 97.9 °F (36.6 °C) (09/28/17 1240)  Pulse: 64 (09/28/17 1240)  Resp: 18 (09/28/17 1240)  BP: (!) 140/67 (09/28/17 1240)  SpO2: 95 % (09/28/17 1240)  O2 Device (Oxygen Therapy): room air (09/28/17 1240) Vital Signs (24h Range):  Temp:  [97.8 °F (36.6 °C)-98.1 °F (36.7 °C)] 97.9 °F (36.6 °C)  Pulse:  [58-93] 64  Resp:  [12-18] 18  SpO2:  [95 %-98 %] 95 %  BP: (121-140)/(58-67) 140/67     Weight: 74.6 kg (164 lb 7.4 oz) (09/28/17 0747)  Body mass index is 24.29 kg/m².  Body surface area is 1.91 meters squared.    I/O last 3 completed shifts:  In: 1566.3 [P.O.:1280; I.V.:286.3]  Out: 2050 [Urine:2050]    Physical Exam   Constitutional: He is oriented to person, place, and time. He appears well-developed and well-nourished. No distress.   HENT:   Head: Normocephalic and atraumatic.   Mouth/Throat: Oropharynx is clear and moist.   Eyes: EOM are normal. Pupils are equal, round, and reactive to light.   Neck: Neck supple.   Cardiovascular: Normal rate, regular rhythm and intact distal pulses.    Murmur ((?radiation of AVF)) heard.  Pulmonary/Chest: Effort normal. No respiratory distress. He has no wheezes.   Abdominal: Soft. Bowel sounds are normal. He exhibits no distension. There is no tenderness. There is no guarding.   Musculoskeletal: Normal range of motion.   Bilateral lower extremity trace pitting edema up to shins.    Neurological: He is alert and oriented to person, place, and time.   Skin: Skin is warm and dry. Capillary refill takes less than 2  seconds. He is not diaphoretic.       Significant Labs:  CBC:   Recent Labs  Lab 09/27/17  0500   WBC 7.26   RBC 3.01*   HGB 9.3*   HCT 29.3*      MCV 97   MCH 30.9   MCHC 31.7*     CMP:   Recent Labs  Lab 09/28/17  0440   GLU 97   CALCIUM 8.6*   ALBUMIN 2.8*   PROT 5.6*      K 4.2   CO2 21*      BUN 85*   CREATININE 5.4*   ALKPHOS 50*   ALT 15   AST 11   BILITOT 0.4     No results for input(s): COLORU, CLARITYU, SPECGRAV, PHUR, PROTEINUA, GLUCOSEU, BILIRUBINCON, BLOODU, WBCU, RBCU, BACTERIA, MUCUS, NITRITE, LEUKOCYTESUR, UROBILINOGEN, HYALINECASTS in the last 168 hours.    Significant Imaging:  Labs: Reviewed  Echo: Reviewed

## 2017-09-28 NOTE — PLAN OF CARE
Problem: Patient Care Overview  Goal: Plan of Care Review  Outcome: Ongoing (interventions implemented as appropriate)  Pt remained free from injury during shift. VSS. Pt denies pain or SOB. Hep gtt d/c'ed today. Pt went for stress test during shift. Nephrology consulted for recs on hd and angiogram. Lasix given BID. Pt ambulating in room with stand by assistance. O2 weaned to 1L NC. SATS remain >90%. Tele monitoring in place. Wife remains at bedside. Call light in reach. Will continue to monitor.

## 2017-09-28 NOTE — ASSESSMENT & PLAN NOTE
- Patient presents with 1 day history of chest tightness, EKG with ST depression in inferior leads, and a troponin of 5.   - Patient initially refused angiogram as it may put him at risk for dialysis, which he did  not want.  The risk of death due to large myocardial infarctions was explained to Mr. Maria, however he still deferred angiogram and PCI for medical management. Currently patient now wanting angiogram- pending SPECT stress test as per interventional cardiology and nephro consult  - Continue ASA 81 mg QD, ticagrelor 90 mg BID, and heparin gtt x 48 hours to finish today  - Metoprolol succinate 50 mg QD  - Rosuvastatin 10 mg QD (renally dosed).   - EKG 9/26 after chest pain resolved showed resolution of ST and T wave changed with 1st degree AV block.  - Troponins peaked at 10   - Will need to coordinate care between nephrology and cardiology.

## 2017-09-28 NOTE — SUBJECTIVE & OBJECTIVE
Interval History:   Patient has decided to pursue possible angiogram. Consulted cardiology who feels cath is warranted and agrees with consult to interventional cardiology and nephrology. Interventional cardiology wants a stress test before proceeding with angiogram given concern for putting patient on dialysis. SOB improved with diuresis though renal function continues to decline.     Review of Systems   Constitutional: Negative for chills, diaphoresis and fever.   HENT: Negative for congestion and sore throat.    Eyes: Negative for photophobia and visual disturbance.   Respiratory: Negative for cough, chest tightness and shortness of breath.    Cardiovascular: Negative for chest pain and palpitations.   Gastrointestinal: Negative for abdominal pain, nausea and vomiting.   Genitourinary: Negative for dysuria and hematuria.   Musculoskeletal: Negative for arthralgias and myalgias.   Skin: Negative for color change and rash.   Neurological: Negative for light-headedness and headaches.     Objective:     Vital Signs (Most Recent):  Temp: 97.8 °F (36.6 °C) (09/28/17 0747)  Pulse: 66 (09/28/17 0747)  Resp: 17 (09/28/17 0747)  BP: 136/61 (09/28/17 0747)  SpO2: 96 % (09/28/17 0747) Vital Signs (24h Range):  Temp:  [97.8 °F (36.6 °C)-98.1 °F (36.7 °C)] 97.8 °F (36.6 °C)  Pulse:  [58-93] 66  Resp:  [12-18] 17  SpO2:  [94 %-98 %] 96 %  BP: (121-136)/(58-62) 136/61     Weight: 74.6 kg (164 lb 7.4 oz)  Body mass index is 24.29 kg/m².    Intake/Output Summary (Last 24 hours) at 09/28/17 0959  Last data filed at 09/28/17 0900   Gross per 24 hour   Intake              854 ml   Output             2150 ml   Net            -1296 ml      Physical Exam   Constitutional: He is oriented to person, place, and time. He appears well-developed and well-nourished. No distress.   HENT:   Head: Normocephalic and atraumatic.   Mouth/Throat: Oropharynx is clear and moist.   Eyes: EOM are normal. Pupils are equal, round, and reactive to light.    Neck: Neck supple. JVD present.   Cardiovascular: Normal rate, regular rhythm and intact distal pulses.    Murmur heard.  Pulmonary/Chest: Effort normal. No respiratory distress. He has no wheezes. He has rales.   Abdominal: Soft. Bowel sounds are normal. He exhibits no distension. There is no tenderness. There is no guarding.   Musculoskeletal: Normal range of motion.   Bilateral lower extremity 1+ pitting edema up to shins.    Neurological: He is alert and oriented to person, place, and time.   Skin: Skin is warm and dry. Capillary refill takes less than 2 seconds. He is not diaphoretic.       Significant Labs: All pertinent labs within the past 24 hours have been reviewed.    Significant Imaging: I have reviewed all pertinent imaging results/findings within the past 24 hours.

## 2017-09-29 ENCOUNTER — SURGERY (OUTPATIENT)
Age: 82
End: 2017-09-29

## 2017-09-29 PROBLEM — N18.4 CKD (CHRONIC KIDNEY DISEASE) STAGE 4, GFR 15-29 ML/MIN: Status: ACTIVE | Noted: 2017-09-29

## 2017-09-29 LAB
ALBUMIN SERPL BCP-MCNC: 2.9 G/DL
ALP SERPL-CCNC: 53 U/L
ALT SERPL W/O P-5'-P-CCNC: 14 U/L
ANION GAP SERPL CALC-SCNC: 12 MMOL/L
AST SERPL-CCNC: 8 U/L
BASOPHILS # BLD AUTO: 0.01 K/UL
BASOPHILS NFR BLD: 0.2 %
BILIRUB SERPL-MCNC: 0.5 MG/DL
BUN SERPL-MCNC: 90 MG/DL
CALCIUM SERPL-MCNC: 8.8 MG/DL
CHLORIDE SERPL-SCNC: 107 MMOL/L
CO2 SERPL-SCNC: 21 MMOL/L
CREAT SERPL-MCNC: 5.5 MG/DL
DIFFERENTIAL METHOD: ABNORMAL
EOSINOPHIL # BLD AUTO: 0.2 K/UL
EOSINOPHIL NFR BLD: 2.9 %
ERYTHROCYTE [DISTWIDTH] IN BLOOD BY AUTOMATED COUNT: 15 %
EST. GFR  (AFRICAN AMERICAN): 10.1 ML/MIN/1.73 M^2
EST. GFR  (NON AFRICAN AMERICAN): 8.7 ML/MIN/1.73 M^2
GLUCOSE SERPL-MCNC: 99 MG/DL
HCT VFR BLD AUTO: 29.7 %
HGB BLD-MCNC: 9.7 G/DL
LYMPHOCYTES # BLD AUTO: 1.2 K/UL
LYMPHOCYTES NFR BLD: 23.3 %
MAGNESIUM SERPL-MCNC: 1.8 MG/DL
MCH RBC QN AUTO: 31 PG
MCHC RBC AUTO-ENTMCNC: 32.7 G/DL
MCV RBC AUTO: 95 FL
MONOCYTES # BLD AUTO: 0.2 K/UL
MONOCYTES NFR BLD: 4.6 %
NEUTROPHILS # BLD AUTO: 3.6 K/UL
NEUTROPHILS NFR BLD: 68.6 %
PHOSPHATE SERPL-MCNC: 4.4 MG/DL
PLATELET # BLD AUTO: 143 K/UL
PMV BLD AUTO: 12.1 FL
POTASSIUM SERPL-SCNC: 3.6 MMOL/L
PROT SERPL-MCNC: 5.7 G/DL
RBC # BLD AUTO: 3.13 M/UL
SODIUM SERPL-SCNC: 140 MMOL/L
WBC # BLD AUTO: 5.2 K/UL

## 2017-09-29 PROCEDURE — 25000003 PHARM REV CODE 250: Performed by: INTERNAL MEDICINE

## 2017-09-29 PROCEDURE — 99222 1ST HOSP IP/OBS MODERATE 55: CPT | Mod: ,,, | Performed by: SURGERY

## 2017-09-29 PROCEDURE — A4216 STERILE WATER/SALINE, 10 ML: HCPCS | Performed by: STUDENT IN AN ORGANIZED HEALTH CARE EDUCATION/TRAINING PROGRAM

## 2017-09-29 PROCEDURE — B2111ZZ FLUOROSCOPY OF MULTIPLE CORONARY ARTERIES USING LOW OSMOLAR CONTRAST: ICD-10-PCS | Performed by: INTERNAL MEDICINE

## 2017-09-29 PROCEDURE — C1894 INTRO/SHEATH, NON-LASER: HCPCS

## 2017-09-29 PROCEDURE — 93459 L HRT ART/GRFT ANGIO: CPT | Mod: 26,GC,, | Performed by: INTERNAL MEDICINE

## 2017-09-29 PROCEDURE — B2151ZZ FLUOROSCOPY OF LEFT HEART USING LOW OSMOLAR CONTRAST: ICD-10-PCS | Performed by: INTERNAL MEDICINE

## 2017-09-29 PROCEDURE — 25000003 PHARM REV CODE 250: Performed by: STUDENT IN AN ORGANIZED HEALTH CARE EDUCATION/TRAINING PROGRAM

## 2017-09-29 PROCEDURE — 85025 COMPLETE CBC W/AUTO DIFF WBC: CPT

## 2017-09-29 PROCEDURE — 99232 SBSQ HOSP IP/OBS MODERATE 35: CPT | Mod: GC,,, | Performed by: INTERNAL MEDICINE

## 2017-09-29 PROCEDURE — 80053 COMPREHEN METABOLIC PANEL: CPT

## 2017-09-29 PROCEDURE — 25000242 PHARM REV CODE 250 ALT 637 W/ HCPCS: Performed by: INTERNAL MEDICINE

## 2017-09-29 PROCEDURE — 63600175 PHARM REV CODE 636 W HCPCS

## 2017-09-29 PROCEDURE — 4A023N7 MEASUREMENT OF CARDIAC SAMPLING AND PRESSURE, LEFT HEART, PERCUTANEOUS APPROACH: ICD-10-PCS | Performed by: INTERNAL MEDICINE

## 2017-09-29 PROCEDURE — 63600175 PHARM REV CODE 636 W HCPCS: Performed by: INTERNAL MEDICINE

## 2017-09-29 PROCEDURE — 84100 ASSAY OF PHOSPHORUS: CPT

## 2017-09-29 PROCEDURE — 93990 DOPPLER FLOW TESTING: CPT | Performed by: SURGERY

## 2017-09-29 PROCEDURE — 99223 1ST HOSP IP/OBS HIGH 75: CPT | Mod: 25,GC,, | Performed by: INTERNAL MEDICINE

## 2017-09-29 PROCEDURE — 93010 ELECTROCARDIOGRAM REPORT: CPT | Mod: ,,, | Performed by: INTERNAL MEDICINE

## 2017-09-29 PROCEDURE — 25000003 PHARM REV CODE 250

## 2017-09-29 PROCEDURE — 20600001 HC STEP DOWN PRIVATE ROOM

## 2017-09-29 PROCEDURE — 99233 SBSQ HOSP IP/OBS HIGH 50: CPT | Mod: ,,, | Performed by: INTERNAL MEDICINE

## 2017-09-29 PROCEDURE — 83735 ASSAY OF MAGNESIUM: CPT

## 2017-09-29 PROCEDURE — 93005 ELECTROCARDIOGRAM TRACING: CPT

## 2017-09-29 PROCEDURE — 99152 MOD SED SAME PHYS/QHP 5/>YRS: CPT | Mod: GC,,, | Performed by: INTERNAL MEDICINE

## 2017-09-29 PROCEDURE — 36415 COLL VENOUS BLD VENIPUNCTURE: CPT

## 2017-09-29 RX ORDER — ACETYLCYSTEINE 200 MG/ML
1200 SOLUTION ORAL; RESPIRATORY (INHALATION) ONCE
Status: COMPLETED | OUTPATIENT
Start: 2017-09-29 | End: 2017-09-29

## 2017-09-29 RX ORDER — NAPROXEN SODIUM 220 MG/1
81 TABLET, FILM COATED ORAL ONCE
Status: CANCELLED | OUTPATIENT
Start: 2017-09-29 | End: 2017-09-29

## 2017-09-29 RX ORDER — ACETYLCYSTEINE 200 MG/ML
1200 INJECTION INTRAVENOUS ONCE
Status: DISCONTINUED | OUTPATIENT
Start: 2017-09-29 | End: 2017-09-29 | Stop reason: CLARIF

## 2017-09-29 RX ORDER — SODIUM CHLORIDE 9 MG/ML
3 INJECTION, SOLUTION INTRAVENOUS CONTINUOUS
Status: ACTIVE | OUTPATIENT
Start: 2017-09-29 | End: 2017-09-29

## 2017-09-29 RX ORDER — SODIUM CHLORIDE 9 MG/ML
INJECTION, SOLUTION INTRAVENOUS CONTINUOUS
Status: DISCONTINUED | OUTPATIENT
Start: 2017-09-29 | End: 2017-09-29

## 2017-09-29 RX ORDER — HEPARIN SODIUM 5000 [USP'U]/ML
5000 INJECTION, SOLUTION INTRAVENOUS; SUBCUTANEOUS EVERY 8 HOURS
Status: DISCONTINUED | OUTPATIENT
Start: 2017-09-29 | End: 2017-09-30 | Stop reason: HOSPADM

## 2017-09-29 RX ORDER — SODIUM CHLORIDE 9 MG/ML
1.5 INJECTION, SOLUTION INTRAVENOUS CONTINUOUS
Status: ACTIVE | OUTPATIENT
Start: 2017-09-29 | End: 2017-09-29

## 2017-09-29 RX ORDER — ALPRAZOLAM 0.25 MG/1
0.25 TABLET ORAL ONCE
Status: COMPLETED | OUTPATIENT
Start: 2017-09-29 | End: 2017-09-29

## 2017-09-29 RX ORDER — RAMELTEON 8 MG/1
8 TABLET ORAL NIGHTLY PRN
Status: DISCONTINUED | OUTPATIENT
Start: 2017-09-29 | End: 2017-09-30 | Stop reason: HOSPADM

## 2017-09-29 RX ADMIN — HYDRALAZINE HYDROCHLORIDE 75 MG: 50 TABLET ORAL at 05:09

## 2017-09-29 RX ADMIN — SEVELAMER CARBONATE 1600 MG: 800 TABLET, FILM COATED ORAL at 09:09

## 2017-09-29 RX ADMIN — HEPARIN SODIUM 5000 UNITS: 5000 INJECTION, SOLUTION INTRAVENOUS; SUBCUTANEOUS at 08:09

## 2017-09-29 RX ADMIN — SODIUM BICARBONATE 650 MG TABLET 650 MG: at 09:09

## 2017-09-29 RX ADMIN — OMEGA-3-ACID ETHYL ESTERS 1 G: 1 CAPSULE, LIQUID FILLED ORAL at 08:09

## 2017-09-29 RX ADMIN — TICAGRELOR 90 MG: 90 TABLET ORAL at 08:09

## 2017-09-29 RX ADMIN — PANTOPRAZOLE SODIUM 40 MG: 40 TABLET, DELAYED RELEASE ORAL at 09:09

## 2017-09-29 RX ADMIN — ALLOPURINOL 100 MG: 100 TABLET ORAL at 09:09

## 2017-09-29 RX ADMIN — Medication 3 ML: at 08:09

## 2017-09-29 RX ADMIN — LEVOTHYROXINE SODIUM 25 MCG: 25 TABLET ORAL at 05:09

## 2017-09-29 RX ADMIN — ISOSORBIDE MONONITRATE 120 MG: 30 TABLET, EXTENDED RELEASE ORAL at 09:09

## 2017-09-29 RX ADMIN — TAMSULOSIN HYDROCHLORIDE 0.4 MG: 0.4 CAPSULE ORAL at 09:09

## 2017-09-29 RX ADMIN — SODIUM CHLORIDE 3 ML/KG/HR: 0.9 INJECTION, SOLUTION INTRAVENOUS at 12:09

## 2017-09-29 RX ADMIN — ACETYLCYSTEINE 1200 MG: 200 INHALANT RESPIRATORY (INHALATION) at 12:09

## 2017-09-29 RX ADMIN — ROSUVASTATIN CALCIUM 10 MG: 5 TABLET ORAL at 09:09

## 2017-09-29 RX ADMIN — FINASTERIDE 5 MG: 5 TABLET, FILM COATED ORAL at 09:09

## 2017-09-29 RX ADMIN — ALPRAZOLAM 0.25 MG: 0.25 TABLET ORAL at 10:09

## 2017-09-29 RX ADMIN — OMEGA-3-ACID ETHYL ESTERS 1 G: 1 CAPSULE, LIQUID FILLED ORAL at 09:09

## 2017-09-29 RX ADMIN — SEVELAMER CARBONATE 1600 MG: 800 TABLET, FILM COATED ORAL at 06:09

## 2017-09-29 RX ADMIN — SODIUM CHLORIDE 1.5 ML/KG/HR: 9 INJECTION, SOLUTION INTRAVENOUS at 05:09

## 2017-09-29 RX ADMIN — SODIUM CHLORIDE: 0.9 INJECTION, SOLUTION INTRAVENOUS at 12:09

## 2017-09-29 RX ADMIN — TICAGRELOR 90 MG: 90 TABLET ORAL at 10:09

## 2017-09-29 RX ADMIN — ASPIRIN 81 MG CHEWABLE TABLET 81 MG: 81 TABLET CHEWABLE at 10:09

## 2017-09-29 RX ADMIN — Medication 3 ML: at 05:09

## 2017-09-29 RX ADMIN — METOPROLOL SUCCINATE 100 MG: 100 TABLET, EXTENDED RELEASE ORAL at 09:09

## 2017-09-29 RX ADMIN — CALCITRIOL 0.25 MCG: 0.25 CAPSULE, LIQUID FILLED ORAL at 06:09

## 2017-09-29 NOTE — ASSESSMENT & PLAN NOTE
This is a 86 yo M with baseline CKD5 (Cr of 3.9, eGFR of 10) who [place] [facility] [place] of [place]STEMI initially not wanting an angiogram but has since elected to get the procedure.  Nephrology was consulted for the use of contrast in a CKD5 patient.  -currently producing adequate urine  -BUN/Cr is stable today (slowly increased 4.7 to 5.1 to 5.4 to 5.5)      PLAN:  -50cc/hr for 6 hours before and 50cc/hr for 6 hours after angiogram  -give 1200 of Mucomyst 12 hours before angiogram  -avoid nephrotoxic agents, monitor ins/outs  -F/U VAS U/S HD access to assess flow rates (placed in EJ 8/9/17) which stated it is mature and can be used  -will go to to cardiac cath today.

## 2017-09-29 NOTE — ASSESSMENT & PLAN NOTE
- Patient presents with 1 day history of chest tightness, EKG with ST depression in inferior leads, and a troponin of 5.   - Patient initially refused angiogram as it may put him at risk for dialysis, which he did  not want.  The risk of death due to large myocardial infarctions was explained to Mr. Maria, however he still deferred angiogram and PCI for medical management. Currently patient now wanting angiogram- pending SPECT stress test as per interventional cardiology and nephro consult  - Continue ASA 81 mg QD, ticagrelor 90 mg BID, and heparin gtt x 48 hours to finish today  - Metoprolol succinate 50 mg QD  - Rosuvastatin 10 mg QD (renally dosed).   - SPECT stress test was positive for myocardial ischemia.  - Cardiology plans to take patient for heart cath this evening.   - Nephrology evaluated patient yesterday and felt he was ok for heart cath.  - Per nephrology, mucomyst 12 hrs prior to heart cath and IV NS 50 cc/hr for 6 hours prior and post heart cath.   - Will f/u cardiology recs s/p heart cath.

## 2017-09-29 NOTE — ASSESSMENT & PLAN NOTE
- Patient had an echo on 9/8/17 which showed an EF of 50%  - Presented to the ED yesterday with a BNP of 3900 and pulmonary edema on chest x-ray in the setting of NSTEMI.  - 2D echo yesterday showed EF 50%, elevated PA pressures, and mild RV systolic dysfunction.   - SOB has resolved. Patient completed lasix 80 IV BID x 4 doses.   - Continue Metoprolol succinate 50 mg QD.

## 2017-09-29 NOTE — INTERVAL H&P NOTE
The patient has been examined and the H&P has been reviewed:    I concur with the findings and no changes have occurred since H&P was written.    Anesthesia/Surgery risks, benefits and alternative options discussed and understood by patient/family.          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

## 2017-09-29 NOTE — PROGRESS NOTES
Clear liquid diet ordered so patient can order sprite to take ordered medication Acetylcysteine (must be mixed with carbonated beverage per pharmacy).   NPO status will continue.   Will continue to monitor.

## 2017-09-29 NOTE — ASSESSMENT & PLAN NOTE
- Patient received Epo injections every 14 days as an outpatient.  - Hgb currently stable at 9.7  - Will continue to monitor daily CBC.

## 2017-09-29 NOTE — ASSESSMENT & PLAN NOTE
- Creatinine continues to trend upwards. Up from baseline of 3.9  - Could represent gradual worsening of CKD as patient refuses dialysis or 2/2 cardiorenal syndrome given NSTEMI and worsening CHF.   - Continue medical management of HTN, CHF, and NSTEMI.  - Sevelamer 1600 mg PO QD with meals.  - Sodium bicarb 650 mg PO QD.   - Edd continue to monitor renal function.   - Nephrology evaluated patient and feel he is ok for dialysis. Recommended vascular consult to assess patency of right arm fistula.   - Vascular surgery evaluated fistula and felt it was ok to use. Will order vascular u/s of fistula to further assess flow rate.    - Per nephrology, mucomyst 12 hrs prior to heart cath and IV NS 50 cc/hr for 6 hours prior and post heart cath.

## 2017-09-29 NOTE — PROGRESS NOTES
Patient transferred to cath lab per stretcher by transport aide.   Transport aide aware of need for telemetry monitoring and continuous IV fluids.   No acute distress noted. Will monitor for return.

## 2017-09-29 NOTE — PROGRESS NOTES
[facility]  Nephrology  Progress Note    Patient Name: Jose Maria Jr.  MRN: [id number]  Admission Date: 9/25/2017  Hospital Length of Stay: 3 days  Attending Provider: Dylan Bedolla MD   Primary Care Physician: Lamar Felix MD  Principal Problem:NSTEMI (non-ST elevated myocardial infarction)    Subjective:     HPI: Mr. 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    He began having substernal chest tightness and indigestion on 9/25 at 10AM. He was seen in the ED on 9/25, initially with troponin of 0.04, subsequently increased to 5.9-->10.3 peak before downtrending. EKGs with SHAYY in aVR, worsened STD and TWI from baseline in inferolateral leads. He was managed medically per patient preference for NSTEMI given his CKD and aversion to HD. An echo showed EF 50-55%, severely hypokinetic apical septum, apical lateral wall, mid anterolateral wall. He was started on aspirin, ticagrelor, heparin gtt, metoprolol succinate and rosuvastatin. He has been CP free since the ED on 9/25.     He was subsequently admitted to Hospital Medicine.  Last night he began to have SOB and decided that he did want an angiogram, even if this meant he may be on HD. He has had a AVF placed in his R arm 8/2017 for worsening renal functions.  His baseline Cr is 3.9. He follows with Dr. AMELIE Fung at [facility] in [place].     Of note, he had a recent admission with an NSTEMI  on 9/10 with NSTEMI, troponin peaked at 18. He is a patient of Dr. Marin at [facility] [place]. Managed medically at that time due to his renal function.        Interval History: KYRIE.  Pt was seen by vascular, who will get VAS U/S HD access to assess flow rates today.  Angiogram planned for today. BUN/Cr are stable.  Patient has no complaints.     Review of patient's allergies indicates:   Allergen Reactions     Benadryl [diphenhydramine hcl] Rash    Versed [midazolam] Rash     Current Facility-Administered Medications   Medication Frequency    allopurinol tablet 100 mg Daily    aspirin chewable tablet 81 mg Daily    calcitRIOL capsule 0.25 mcg Every Mon, Wed, Fri    finasteride tablet 5 mg Daily    GI cocktail (mylanta 30 mL, lidocaine 2 % viscous 10 mL, dicyclomine 10 mL) 50 mL BID PRN    heparin (porcine) injection 5,000 Units Q8H    hydrALAZINE tablet 75 mg TID    isosorbide mononitrate 24 hr tablet 120 mg Daily    levothyroxine tablet 25 mcg Daily    metoprolol succinate (TOPROL-XL) 24 hr tablet 100 mg Daily    nitroGLYCERIN SL tablet 0.4 mg Q5 Min PRN    omega-3 acid ethyl esters capsule 1 g BID    pantoprazole EC tablet 40 mg Daily    rosuvastatin tablet 10 mg Daily    sevelamer carbonate tablet 1,600 mg TID WM    sodium bicarbonate tablet 650 mg Daily    sodium chloride 0.9% flush 3 mL Q8H    tamsulosin 24 hr capsule 0.4 mg Daily    ticagrelor tablet 90 mg BID       Objective:     Vital Signs (Most Recent):  Temp: 97.5 °F (36.4 °C) (09/29/17 0800)  Pulse: 83 (09/29/17 0800)  Resp: 18 (09/29/17 0800)  BP: (!) 152/68 (09/29/17 0800)  SpO2: 97 % (09/29/17 0800)  O2 Device (Oxygen Therapy): nasal cannula (09/29/17 0800) Vital Signs (24h Range):  Temp:  [97.5 °F (36.4 °C)-98.5 °F (36.9 °C)] 97.5 °F (36.4 °C)  Pulse:  [60-83] 83  Resp:  [18-20] 18  SpO2:  [95 %-97 %] 97 %  BP: (116-152)/(55-68) 152/68     Weight: 76.1 kg (167 lb 12.3 oz) (09/29/17 0515)  Body mass index is 24.78 kg/m².  Body surface area is 1.92 meters squared.    I/O last 3 completed shifts:  In: 1282.7 [P.O.:1140; I.V.:142.7]  Out: 3605 [Urine:3605]    Physical Exam   Constitutional: He is oriented to person, place, and time. He appears well-developed and well-nourished. No distress.   HENT:   Head: Normocephalic and atraumatic.   Mouth/Throat: Oropharynx is clear and moist.   Eyes: EOM are normal. Pupils are equal, round, and  reactive to light.   Neck: Neck supple.   Cardiovascular: Normal rate, regular rhythm and intact distal pulses.    Murmur ((?radiation of AVF)) heard.  Pulmonary/Chest: Effort normal. No respiratory distress. He has no wheezes.   Abdominal: Soft. Bowel sounds are normal. He exhibits no distension. There is no tenderness. There is no guarding.   Musculoskeletal: Normal range of motion.   Bilateral lower extremity trace pitting edema up to shins.    Neurological: He is alert and oriented to person, place, and time.   Skin: Skin is warm and dry. Capillary refill takes less than 2 seconds. He is not diaphoretic.       Significant Labs:  CBC:   Recent Labs  Lab 09/27/17  0500   WBC 7.26   RBC 3.01*   HGB 9.3*   HCT 29.3*      MCV 97   MCH 30.9   MCHC 31.7*     CMP:   Recent Labs  Lab 09/29/17  0417   GLU 99   CALCIUM 8.8   ALBUMIN 2.9*   PROT 5.7*      K 3.6   CO2 21*      BUN 90*   CREATININE 5.5*   ALKPHOS 53*   ALT 14   AST 8*   BILITOT 0.5     All labs within the past 24 hours have been reviewed.     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  Nephrology was consulted for the use of contrast in a CKD5 patient.  -currently producing adequate urine  -BUN/Cr is stable today (slowly increased 4.7 to 5.1 to 5.4 to 5.5)      PLAN:  -50cc/hr for 6 hours before and 50cc/hr for 6 hours after angiogram  -give 1200 of Mucomyst 12 hours before angiogram  -avoid nephrotoxic agents, monitor ins/outs  -F/U VAS U/S HD access to assess flow rates (placed in EJ 8/9/17) which stated it is mature and can be used  -will go to to cardiac cath today.               Thank you for your consult. I will follow-up with patient. Please contact us if you have any additional  questions.    Janell Wells MD  Nephrology  [facility]

## 2017-09-29 NOTE — H&P (VIEW-ONLY)
INTERVENTIONAL CARDIOLOGY CONSULT      Referring Physician:  Dylan Bedolla MD  Indication: NSTEMI     HPI:  85 y.o. male with a history significant for CAD s/p CABG (SVG-LAD, SVG-PDA, SVG-OM1), PCI to SVG-Om1 and SVG-PDA (4./2016), ICM, CKD IV/V (baseline Cr 4), HTN, HLD presenting with substernal chest tightness and indigestion on 9/25 at 10AM. He was seen in the ED on 9/25, initially with troponin of 0.04, subsequently increased to 5.9-->10.3 peak before downtrending. EKGs with SHAYY in aVR, worsened STD and TWI from baseline in inferolateral leads. He was managed medically per patient preference for NSTEMI given his CKD and aversion to Hd.  He was recently admitted on 9/10 with NSTEMI, troponin peaked at 18. He is a patient of Dr. Marin at [facility]. Managed medically at that time due to his renal function.  He has had a AVF placed in his R arm ~6 weeks ago. Though he initially declined intervention as he was advised that he would likely progress to ESRD/HD. However, he now reports wanting intervention and willingness to undergo HD.  An echo showed EF 50-55%, severely hypokinetic apical septum, apical lateral wall, mid anterolateral wall. He was started on aspirin, ticagrelor, heparin gtt, metoprolol succinate and rosuvastatin. He has been CP free since the ED on 9/25.      ROS:  Constitution: Negative for fever, chills, weight loss or gain.   HENT: Negative for sore throat, rhinorrhea, or headache.  Eyes: Negative for blurred or double vision.   Cardiovascular: See above  Pulmonary: Positive for SOB   Gastrointestinal: Negative for abdominal pain, nausea, vomiting, or diarrhea.   : Negative for dysuria.   Neurological: Negative for focal weakness or sensory changes.    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 4/7/2016    Coronary artery disease     Diabetes mellitus     Gout     High cholesterol      Hypertension     Persistent proteinuria 2/5/2017    Renal cyst 2/5/2017    Thyroid disease      Past Surgical History:   Procedure Laterality Date    aaa bypass      GALLBLADDER SURGERY      KNEE SURGERY       No family history on file.  Social History   Substance Use Topics    Smoking status: Never Smoker    Smokeless tobacco: Never Used    Alcohol use No     Review of patient's allergies indicates:   Allergen Reactions    Benadryl [diphenhydramine hcl] Rash    Versed [midazolam] Rash      allopurinol  100 mg Oral Daily    aspirin  81 mg Oral Daily    calcitRIOL  0.25 mcg Oral Every Mon, Wed, Fri    finasteride  5 mg Oral Daily    furosemide  80 mg Intravenous BID    hydrALAZINE  75 mg Oral TID    isosorbide mononitrate  120 mg Oral Daily    levothyroxine  25 mcg Oral Daily    metoprolol succinate  50 mg Oral Daily    omega-3 acid ethyl esters  1 g Oral BID    pantoprazole  40 mg Oral Daily    rosuvastatin  10 mg Oral Daily    sevelamer carbonate  1,600 mg Oral TID WM    sodium bicarbonate  650 mg Oral Daily    sodium chloride 0.9%  3 mL Intravenous Q8H    tamsulosin  0.4 mg Oral Daily    ticagrelor  90 mg Oral BID     No current facility-administered medications on file prior to encounter.      Current Outpatient Prescriptions on File Prior to Encounter   Medication Sig Dispense Refill    ACCU-CHEK TANIKA PLUS TEST STRP Strp once daily.       allopurinol (ZYLOPRIM) 100 MG tablet Take 1 tablet (100 mg total) by mouth once daily. 30 tablet 0    alprazolam (XANAX) 0.25 MG tablet Take 1 tablet (0.25 mg total) by mouth nightly as needed for Anxiety. 60 tablet 0    aspirin 81 MG Chew Take 81 mg by mouth once daily.      calcitRIOL (ROCALTROL) 0.25 MCG Cap Take 1 capsule (0.25 mcg total) by mouth every Mon, Wed, Fri. 12 capsule 11    finasteride (PROSCAR) 5 mg tablet Take 5 mg by mouth once daily.      furosemide (LASIX) 40 MG tablet Take 1 tablet (40 mg total) by mouth once daily.  30 tablet 11    hydrALAZINE (APRESOLINE) 25 MG tablet Take 3 tablets (75 mg total) by mouth 3 (three) times daily. 270 tablet 11    isosorbide mononitrate (IMDUR) 120 MG 24 hr tablet Take 1 tablet (120 mg total) by mouth once daily. 30 tablet 11    levothyroxine (SYNTHROID) 25 MCG tablet Take 1 tablet (25 mcg total) by mouth once daily. 90 tablet 3    metoprolol tartrate (LOPRESSOR) 25 MG tablet Take 0.5 tablets (12.5 mg total) by mouth 2 (two) times daily. 30 tablet 11    multivitamin capsule Take 1 capsule by mouth once daily.      nitroGLYCERIN (NITROSTAT) 0.4 MG SL tablet Place 1 tablet (0.4 mg total) under the tongue every 5 (five) minutes as needed for Chest pain. 25 tablet 1    omega-3 acid ethyl esters (LOVAZA) 1 gram capsule Take 1 capsule (1 g total) by mouth 2 (two) times daily. 60 capsule 3    omeprazole (PRILOSEC) 20 MG capsule Take 1 capsule (20 mg total) by mouth once daily. 30 capsule 11    rosuvastatin (CRESTOR) 40 MG Tab Take 1 tablet (40 mg total) by mouth every Mon, Wed, Fri. 36 tablet 3    sevelamer HCl (RENAGEL) 800 MG Tab Take 2 tablets (1,600 mg total) by mouth 3 (three) times daily with meals. 180 tablet 11    sodium bicarbonate 650 MG tablet Take 1 tablet (650 mg total) by mouth once daily. 90 tablet 3    tamsulosin (FLOMAX) 0.4 mg Cp24 Take 1 capsule (0.4 mg total) by mouth once daily. 30 capsule 11    cyanocobalamin 500 mcg tablet Take 2 tablets (1,000 mcg total) by mouth once daily.         Continuous Infusions:   heparin (porcine) in D5W 13 Units/kg/hr (09/27/17 0858)       Scheduled Meds:   allopurinol  100 mg Oral Daily    aspirin  81 mg Oral Daily    calcitRIOL  0.25 mcg Oral Every Mon, Wed, Fri    finasteride  5 mg Oral Daily    furosemide  80 mg Intravenous BID    hydrALAZINE  75 mg Oral TID    isosorbide mononitrate  120 mg Oral Daily    levothyroxine  25 mcg Oral Daily    metoprolol succinate  50 mg Oral Daily    omega-3 acid ethyl esters  1 g Oral BID     pantoprazole  40 mg Oral Daily    rosuvastatin  10 mg Oral Daily    sevelamer carbonate  1,600 mg Oral TID WM    sodium bicarbonate  650 mg Oral Daily    sodium chloride 0.9%  3 mL Intravenous Q8H    tamsulosin  0.4 mg Oral Daily    ticagrelor  90 mg Oral BID     PRN Meds:GI cocktail (mylanta 30 mL, lidocaine 2 % viscous 10 mL, dicyclomine 10 mL) 50 mL, heparin (PORCINE), heparin (PORCINE), HYDROmorphone, nitroGLYCERIN  OBJECTIVE:     Vitals:    09/28/17 0314 09/28/17 0400 09/28/17 0600 09/28/17 0700   BP:       Pulse: 64 (!) 58 (!) 58 93   Resp:       Temp:       TempSrc:       SpO2:       Weight:       Height:         Gen: Lying in bed, no acute distress, 3LNC in place  HEENT: Supple, +JVD  Cvs: Regular rate and rhythm, no murmurs  Resp: Normal work of breathing, bibasilar rales  Abd: Soft, non-tender and not distended  Ext: Warm, no edema  Vascular:   LEFT RIGHT   RADIAL 2+ 2+   BRACHIAL 2+ 2+   FEMORAL 2+ 2+   DP 2+ 2+   TP 2+ 2+   Jorge's Test Normal Normal     Labs:       Recent Labs  Lab 09/26/17  0623 09/27/17  0500 09/28/17  0440    138 139   K 4.8 4.5 4.2   * 108 106   CO2 19* 20* 21*   BUN 66* 77* 85*   CREATININE 4.7* 5.1* 5.4*   * 120* 97   ANIONGAP 12 10 12       Recent Labs  Lab 09/28/17  0440   MG 2.0   PHOS 4.5       Recent Labs  Lab 09/26/17  0623 09/27/17  0500 09/28/17  0440   AST 25 20 11   ALT 23 21 15   ALKPHOS 62 54* 50*   BILITOT 0.4 0.3 0.4   ALBUMIN 3.0* 2.9* 2.8*        Recent Labs  Lab 09/26/17  0623 09/26/17  0840 09/27/17  0500   WBC 7.85 8.42 7.26   HGB 10.0* 10.8* 9.3*   HCT 31.9* 33.1* 29.3*    194 158   GRAN 86.0*  6.8 82.7*  7.0 80.3*  5.8       Recent Labs  Lab 09/25/17 2135   INR 1.1       Recent Labs  Lab 09/25/17  2135  09/26/17  1843 09/27/17  0018 09/27/17  0500   TROPONINI 0.049*  < > 10.118* 8.200* 7.303*   BNP 3,051*  --   --   --   --    < > = values in this interval not displayed.   Micro:   Blood Cultures  Lab Results   Component  Value Date    LABBLOO No growth after 5 days. 01/20/2017    LABBLOO No growth after 5 days. 12/26/2016    LABBLOO No growth after 5 days. 12/26/2016     Urine Cultures  Lab Results   Component Value Date    LABURIN No growth 12/26/2016     IMAGING:   EKGs: Sr, worsening STD and TWI in inferolateral leads, resolved to baseline    TTE:  EF   Date Value Ref Range Status   09/26/2017 50 55 - 65    09/08/2017 50 55 - 65    12/27/2016 45 55 - 65            IMPRESSION:    85 y.o. male with a history significant for CAD s/p CABG (SVG-LAD, SVG-PDA, SVG-OM1), PCI to SVG-OM1 (2015), ICM, CKD V not on HD, HTN, HLD presenting with NSTEMI ~72 hours ago, peak troponon 10. Recent NSTEMI 2 weeks ago, peak 18. Managed medically, now interested in pursuing intervention; however, currently 72 hours out from ischemic insult as well as ADHF and CKDV not yet on HD. Plan for ischemia guided strategy at this time.    PROCEDURAL RISK STRATIFICATION:   LOGAN Score: 5      Pattison Predicted PCI Mortality Risk: 4.2%   Pattison Predicted MACE Risk:  10.2%  Crusade Bleeding Score & Risk: 60, 16.7%   Contrast Nephropathy Post-PCI/Renal Risk Score: 57.7%    Prior Contrast Allergy: none   Sedation   Prior Sedation: yes   History of Obstructive Sleep Apnea/SDB: no   Pertinent Allergies:   Latex: no   ASA: no    Heparin-Induced Thrombocytopenia: no  PLAN:     CAD (coronary artery disease), native coronary artery  NSTEMI (non-ST elevated myocardial infarction)  -Plan for noninvasive stress test. PET unavailable. Will plan for pharmacologic nuclear SPECT   -Continue medical management with ASA/ticagrelor/toprol/renally dosed crestor  -Once compensated, can uptitrate toprol to 100 for further anti-anginal effect  -Consented for 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    -The risks of coronary angiography include but are not limited to:  Bleeding, infection, heart rhythm abnormalities, allergic reactions, kidney injury, stroke and death.    -Should stenting be indicated, the patient has agreed to dual anti-platelet therapy for 1-consecutive year with a drug-eluting stent and a minimum of 1-month with the use of a bare metal stent.    -The risks of moderate sedation include hypotension, respiratory depression, arrhythmias, bronchospasm, & death.    -Informed consent was obtained & the patient is agreeable to proceed with the procedure.  -This patient was discussed with the attending interventional cardiologist who agrees with the above assessment & plan.      Russell Pennington MD  Cardiology Fellow  Pager [phone]

## 2017-09-29 NOTE — SUBJECTIVE & OBJECTIVE
Interval History: NAEON.  Pt was seen by vascular, who will get VAS U/S HD access to assess flow rates today.  Angiogram planned for today. BUN/Cr are stable.  Patient has no complaints.     Review of patient's allergies indicates:   Allergen Reactions    Benadryl [diphenhydramine hcl] Rash    Versed [midazolam] Rash     Current Facility-Administered Medications   Medication Frequency    allopurinol tablet 100 mg Daily    aspirin chewable tablet 81 mg Daily    calcitRIOL capsule 0.25 mcg Every Mon, Wed, Fri    finasteride tablet 5 mg Daily    GI cocktail (mylanta 30 mL, lidocaine 2 % viscous 10 mL, dicyclomine 10 mL) 50 mL BID PRN    heparin (porcine) injection 5,000 Units Q8H    hydrALAZINE tablet 75 mg TID    isosorbide mononitrate 24 hr tablet 120 mg Daily    levothyroxine tablet 25 mcg Daily    metoprolol succinate (TOPROL-XL) 24 hr tablet 100 mg Daily    nitroGLYCERIN SL tablet 0.4 mg Q5 Min PRN    omega-3 acid ethyl esters capsule 1 g BID    pantoprazole EC tablet 40 mg Daily    rosuvastatin tablet 10 mg Daily    sevelamer carbonate tablet 1,600 mg TID WM    sodium bicarbonate tablet 650 mg Daily    sodium chloride 0.9% flush 3 mL Q8H    tamsulosin 24 hr capsule 0.4 mg Daily    ticagrelor tablet 90 mg BID       Objective:     Vital Signs (Most Recent):  Temp: 97.5 °F (36.4 °C) (09/29/17 0800)  Pulse: 83 (09/29/17 0800)  Resp: 18 (09/29/17 0800)  BP: (!) 152/68 (09/29/17 0800)  SpO2: 97 % (09/29/17 0800)  O2 Device (Oxygen Therapy): nasal cannula (09/29/17 0800) Vital Signs (24h Range):  Temp:  [97.5 °F (36.4 °C)-98.5 °F (36.9 °C)] 97.5 °F (36.4 °C)  Pulse:  [60-83] 83  Resp:  [18-20] 18  SpO2:  [95 %-97 %] 97 %  BP: (116-152)/(55-68) 152/68     Weight: 76.1 kg (167 lb 12.3 oz) (09/29/17 0515)  Body mass index is 24.78 kg/m².  Body surface area is 1.92 meters squared.    I/O last 3 completed shifts:  In: 1282.7 [P.O.:1140; I.V.:142.7]  Out: 3605 [Urine:3605]    Physical Exam    Constitutional: He is oriented to person, place, and time. He appears well-developed and well-nourished. No distress.   HENT:   Head: Normocephalic and atraumatic.   Mouth/Throat: Oropharynx is clear and moist.   Eyes: EOM are normal. Pupils are equal, round, and reactive to light.   Neck: Neck supple.   Cardiovascular: Normal rate, regular rhythm and intact distal pulses.    Murmur ((?radiation of AVF)) heard.  Pulmonary/Chest: Effort normal. No respiratory distress. He has no wheezes.   Abdominal: Soft. Bowel sounds are normal. He exhibits no distension. There is no tenderness. There is no guarding.   Musculoskeletal: Normal range of motion.   Bilateral lower extremity trace pitting edema up to shins.    Neurological: He is alert and oriented to person, place, and time.   Skin: Skin is warm and dry. Capillary refill takes less than 2 seconds. He is not diaphoretic.       Significant Labs:  CBC:   Recent Labs  Lab 09/27/17  0500   WBC 7.26   RBC 3.01*   HGB 9.3*   HCT 29.3*      MCV 97   MCH 30.9   MCHC 31.7*     CMP:   Recent Labs  Lab 09/29/17  0417   GLU 99   CALCIUM 8.8   ALBUMIN 2.9*   PROT 5.7*      K 3.6   CO2 21*      BUN 90*   CREATININE 5.5*   ALKPHOS 53*   ALT 14   AST 8*   BILITOT 0.5     All labs within the past 24 hours have been reviewed.     Significant Imaging:  Labs: Reviewed

## 2017-09-29 NOTE — PROGRESS NOTES
Notified IMJaylan KING of the following:  Patient complaining of anxiety regarding angiogram this afternoon.   Requesting xanax, which he takes at home.   MD to update orders.   Will continue to monitor.

## 2017-09-29 NOTE — PROGRESS NOTES
Interventional Cardiology Progress Note    Subjective:   Interval History: NAEON. Breathing well this am. No complaints. No Cp. Cr 5.5.    Medications:   Continuous Infusions:   sodium chloride 0.9%         Scheduled Meds:   acetylcysteine 200 mg/ml (20%)  1,200 mg Oral Once    allopurinol  100 mg Oral Daily    aspirin  81 mg Oral Daily    calcitRIOL  0.25 mcg Oral Every Mon, Wed, Fri    finasteride  5 mg Oral Daily    heparin (porcine)  5,000 Units Subcutaneous Q8H    hydrALAZINE  75 mg Oral TID    isosorbide mononitrate  120 mg Oral Daily    levothyroxine  25 mcg Oral Daily    metoprolol succinate  100 mg Oral Daily    omega-3 acid ethyl esters  1 g Oral BID    pantoprazole  40 mg Oral Daily    rosuvastatin  10 mg Oral Daily    sevelamer carbonate  1,600 mg Oral TID WM    sodium bicarbonate  650 mg Oral Daily    sodium chloride 0.9%  3 mL Intravenous Q8H    tamsulosin  0.4 mg Oral Daily    ticagrelor  90 mg Oral BID     PRN Meds:GI cocktail (mylanta 30 mL, lidocaine 2 % viscous 10 mL, dicyclomine 10 mL) 50 mL, nitroGLYCERIN  Objective:     Vitals:  Temp:  [97.5 °F (36.4 °C)-98.5 °F (36.9 °C)]   Pulse:  [60-83]   Resp:  [18-20]   BP: (116-152)/(55-68)   SpO2:  [95 %-97 %]  on 2L NC I/O's:    Intake/Output Summary (Last 24 hours) at 09/29/17 1047  Last data filed at 09/29/17 1000   Gross per 24 hour   Intake              600 ml   Output             2455 ml   Net            -1855 ml      Gen: Lying in bed, no acute distress, 3LNC in place  HEENT: Supple, +JVD  Cvs: Regular rate and rhythm, no murmurs  Resp: Normal work of breathing, bibasilar rales  Abd: Soft, non-tender and not distended  Ext: Warm, no edema  Pulses: 2+ Dp, 2+ Pt, 2+ popliteal, 2+ femoral, 2+ L radial, R brachial AVF    Labs:       Recent Labs  Lab 09/27/17  0500 09/28/17  0440 09/29/17  0417    139 140   K 4.5 4.2 3.6    106 107   CO2 20* 21* 21*   BUN 77* 85* 90*   CREATININE 5.1* 5.4* 5.5*   * 97 99    ANIONGAP 10 12 12       Recent Labs  Lab 09/29/17  0417   MG 1.8   PHOS 4.4       Recent Labs  Lab 09/27/17  0500 09/28/17  0440 09/29/17  0417   AST 20 11 8*   ALT 21 15 14   ALKPHOS 54* 50* 53*   BILITOT 0.3 0.4 0.5   ALBUMIN 2.9* 2.8* 2.9*        Recent Labs  Lab 09/26/17  0840 09/27/17  0500 09/29/17  0932   WBC 8.42 7.26 5.20   HGB 10.8* 9.3* 9.7*   HCT 33.1* 29.3* 29.7*    158 143*   GRAN 82.7*  7.0 80.3*  5.8 68.6  3.6       Recent Labs  Lab 09/25/17  2135   INR 1.1       Recent Labs  Lab 09/25/17  2135  09/26/17  1843 09/27/17  0018 09/27/17  0500   TROPONINI 0.049*  < > 10.118* 8.200* 7.303*   BNP 3,051*  --   --   --   --    < > = values in this interval not displayed.   Micro:   Blood Cultures  Lab Results   Component Value Date    LABBLOO No growth after 5 days. 01/20/2017    LABBLOO No growth after 5 days. 12/26/2016    LABBLOO No growth after 5 days. 12/26/2016     Urine Cultures  Lab Results   Component Value Date    LABURIN No growth 12/26/2016       Imaging:     EF   Date Value Ref Range Status   09/26/2017 50 55 - 65    09/08/2017 50 55 - 65    12/27/2016 45 55 - 65    04/05/2016 60 55 - 65      Impression: ABNORMAL MYOCARDIAL PERFUSION  1. There is evidence for a large sized area of moderate myocardial ischemia that extends from the base to the apical inferior and nferolateral wall of the left ventricle with underlying injury present.   2. There is evidence for a small to moderate sized area of mild myocardial ischemia in the mid to apical lateral wall of the left ventricle.  3. There is evidence for a small sized area of mild myocardial ischemia in the mid anterior wall of the left ventricle consistent with diagonal disease.    4. Resting wall motion is physiologic.   5. Resting LV function is normal.  (normal is >= 51%)  6. The left ventricular volume is mildly increased at rest.   7. The extracardiac distribution of radioactivity is normal.   8. There was no previous study available  to compare.    Assessment/Plan    85 y.o. male with a history significant for CAD s/p CABG (SVG-LAD, SVG-PDA, SVG-OM1), PCI to SVG-OM1 (2015), ICM, CKD V not on HD, HTN, HLD presenting with NSTEMI 9/25/17, peak troponon 10. Recent NSTEMI 2 weeks ago, peak 18. Managed medically, now interested in pursuing intervention given high risk of recurrent ischemia. NM Pharm SPECT abnormal as above.     CAD (coronary artery disease), native coronary artery  NSTEMI (non-ST elevated myocardial infarction)  -Continue medical management with ASA/ticagrelor/toprol/renally dosed crestor  -Once compensated, can uptitrate toprol to 100 for further anti-anginal effect  -Consented for LHC on initial consult. See consult note for risk scores assessment.   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 No indication for acute need for HD  - Renal recs for reducing very high risk for TACHO  - Have extensively discussed risk of angiogram to progress CKD to ESRD and HD with patient and wife. He accepts and understands the risks.      Signed:  Russell Pennington MD  Cardiology Fellow  Pager: [phone]  9/29/2017 10:47 AM

## 2017-09-29 NOTE — CONSULTS
"[facility]  Vascular Surgery  Consult Note    Inpatient consult to Vascular Surgery  Consult performed by: RICKY GUIDO  Consult ordered by: RICHMOND AGUILAR  Reason for consult: "rec by nephro- recently placed fistula; want to know if its accessible"        Subjective:     Chief Complaint/Reason for Admission: Chest pain    History of Present Illness:   Patient is an 85-year-old male with Hx of HTN, HLD, DM2, CAD s/p CABG x 3 (SVG-LAD, SVG-PDA, SVG-OM1) and subsequent PCI, CKD V s/p RUE AVF who was admitted to the Internal Medicine service after presenting to the ED (9/25/17) via flight care EMS with complaint of new onset chest pain and found to have NSTEMI on work-up. He had a recent MI that was treated medically per his wishes, as he did not want contrast load to induce ESRD with HD requirement. Cardiology, Interventional Cardiology, and Nephrology have all been involved in his care during this hospital stay. Decision was made to defer cardiac intervention initially due to the above but ultimately the patient desired angiogram for possible intervention. Managing teams have also been attempting diuresis due to hypervolemia. Vascular Surgery has been consulted to assess maturity of patient's HD access.    Patient is currently DNR.      No current facility-administered medications on file prior to encounter.      Current Outpatient Prescriptions on File Prior to Encounter   Medication Sig    ACCU-CHEK TANIKA PLUS TEST STRP Strp once daily.     allopurinol (ZYLOPRIM) 100 MG tablet Take 1 tablet (100 mg total) by mouth once daily.    alprazolam (XANAX) 0.25 MG tablet Take 1 tablet (0.25 mg total) by mouth nightly as needed for Anxiety.    aspirin 81 MG Chew Take 81 mg by mouth once daily.    calcitRIOL (ROCALTROL) 0.25 MCG Cap Take 1 capsule (0.25 mcg total) by mouth every Mon, Wed, Fri.    finasteride (PROSCAR) 5 mg tablet Take 5 mg by mouth once daily.    furosemide (LASIX) 40 MG tablet " Take 1 tablet (40 mg total) by mouth once daily.    hydrALAZINE (APRESOLINE) 25 MG tablet Take 3 tablets (75 mg total) by mouth 3 (three) times daily.    isosorbide mononitrate (IMDUR) 120 MG 24 hr tablet Take 1 tablet (120 mg total) by mouth once daily.    levothyroxine (SYNTHROID) 25 MCG tablet Take 1 tablet (25 mcg total) by mouth once daily.    metoprolol tartrate (LOPRESSOR) 25 MG tablet Take 0.5 tablets (12.5 mg total) by mouth 2 (two) times daily.    multivitamin capsule Take 1 capsule by mouth once daily.    nitroGLYCERIN (NITROSTAT) 0.4 MG SL tablet Place 1 tablet (0.4 mg total) under the tongue every 5 (five) minutes as needed for Chest pain.    omega-3 acid ethyl esters (LOVAZA) 1 gram capsule Take 1 capsule (1 g total) by mouth 2 (two) times daily.    omeprazole (PRILOSEC) 20 MG capsule Take 1 capsule (20 mg total) by mouth once daily.    rosuvastatin (CRESTOR) 40 MG Tab Take 1 tablet (40 mg total) by mouth every Mon, Wed, Fri.    sevelamer HCl (RENAGEL) 800 MG Tab Take 2 tablets (1,600 mg total) by mouth 3 (three) times daily with meals.    sodium bicarbonate 650 MG tablet Take 1 tablet (650 mg total) by mouth once daily.    tamsulosin (FLOMAX) 0.4 mg Cp24 Take 1 capsule (0.4 mg total) by mouth once daily.    cyanocobalamin 500 mcg tablet Take 2 tablets (1,000 mcg total) by mouth once daily.     Review of patient's allergies indicates:   Allergen Reactions    Benadryl [diphenhydramine hcl] Rash    Versed [midazolam] Rash     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 4/7/2016    Coronary artery disease     Diabetes mellitus     Gout     High cholesterol     Hypertension     Persistent proteinuria 2/5/2017    Renal cyst 2/5/2017    Thyroid disease      Past Surgical History:   Procedure Laterality Date    aaa bypass      GALLBLADDER SURGERY      KNEE SURGERY       Family  History     None        Social History Main Topics    Smoking status: Never Smoker    Smokeless tobacco: Never Used    Alcohol use No    Drug use: No    Sexual activity: Not Currently     Review of Systems   Constitutional: Positive for activity change. Negative for appetite change, diaphoresis and fever.   HENT: Negative for congestion, rhinorrhea and sore throat.    Eyes: Negative for visual disturbance.   Respiratory: Negative for cough, shortness of breath and wheezing.    Cardiovascular: Negative for chest pain and palpitations.   Gastrointestinal: Negative for abdominal distention, abdominal pain, constipation, diarrhea, nausea and vomiting.   Genitourinary: Negative for dysuria, frequency and hematuria.   Musculoskeletal: Negative for arthralgias and myalgias.   Skin: Negative for color change, rash and wound.   Neurological: Negative for syncope, weakness and numbness.   Hematological: Does not bruise/bleed easily.   Psychiatric/Behavioral: Negative for behavioral problems and confusion.        Objective:     Vital Signs (Most Recent):  Temp: 97.5 °F (36.4 °C) (09/29/17 0800)  Pulse: 83 (09/29/17 0800)  Resp: 18 (09/29/17 0800)  BP: (!) 152/68 (09/29/17 0800)  SpO2: 97 % (09/29/17 0800) Vital Signs (24h Range):  Temp:  [97.5 °F (36.4 °C)-98.5 °F (36.9 °C)] 97.5 °F (36.4 °C)  Pulse:  [60-83] 83  Resp:  [18-20] 18  SpO2:  [95 %-97 %] 97 %  BP: (116-152)/(55-68) 152/68     Weight: 76.1 kg (167 lb 12.3 oz)  Body mass index is 24.78 kg/m².      Intake/Output Summary (Last 24 hours) at 09/29/17 0818  Last data filed at 09/29/17 0500   Gross per 24 hour   Intake           628.66 ml   Output             2405 ml   Net         -1776.34 ml       Physical Exam   Constitutional: He is oriented to person, place, and time. He appears well-developed. No distress.   HENT:   Head: Normocephalic and atraumatic.   Eyes: EOM are normal.   Neck: Neck supple. No JVD present.   Cardiovascular: Normal rate and intact distal  pulses.    RUE AVF with palpable thrill and some pulsatility, well-healed transverse incision distal to the antecubital fossa   Pulmonary/Chest: Effort normal. No respiratory distress.   Abdominal: Soft. He exhibits no distension. There is no tenderness.   Musculoskeletal: He exhibits no edema or deformity.   Neurological: He is alert and oriented to person, place, and time.   Skin: Skin is warm and dry. No rash noted. He is not diaphoretic. No erythema.   Psychiatric: He has a normal mood and affect. His behavior is normal.       Significant Labs:  CMP:   Recent Labs  Lab 09/29/17  0417   GLU 99   CALCIUM 8.8   ALBUMIN 2.9*   PROT 5.7*      K 3.6   CO2 21*      BUN 90*   CREATININE 5.5*   ALKPHOS 53*   ALT 14   AST 8*   BILITOT 0.5       Significant Diagnostics:  VAS U/S HD Access: Pending      Assessment/Plan:   84 y/o male with above medical comorbidities including CKD V s/p RUE AVF with anticipation of ESRD requiring HD following planned LHC    - Discussed with OSH ([facility]) - RUE AVF created 8/9/17  - VAS U/S HD access to assess flow rates  - Will follow up results - if flows appropriate, may attempt access  - Discussed with Vascular Surgery fellow and Dr. Jerardo Mckeon MD  Surgery Resident, PGY-III  Pager: [phone]  9/29/2017 8:34 AM

## 2017-09-29 NOTE — PROGRESS NOTES
Pt transported from U room 8091 to 10 th floor [facility].  Handoff report given to Sander BENSON RN via telephone.   Pt not in any distress upon leaving the floor by wheelchair, accompanied by Valerio PRICE and pt wife.

## 2017-09-29 NOTE — PLAN OF CARE
Problem: Patient Care Overview  Goal: Plan of Care Review  Outcome: Ongoing (interventions implemented as appropriate)  - This RN assumed care of pt shortly before midnight (lateral transfer from TSU).   - VS WNL throughout evening shift; pt denies pain.  - Pt wearing 1 L nasal cannula. Pt has telemetry monitoring in place. Appears NSR; last 12-lead showed 1st degree heart block.  - Vascular Surgery should see pt today to assess his fistula (right upper arm) to determine accessibility.  - Pt requires an angiogram for s/p NSTEMI. Due to CKD stage 4-5 status, pt will need HD immediately after angiogram to prevent contrast-induced nephropathy. Pt had been refusing HD and thus the angiogram, but is now willing to have HD.  - Per provider notes, once angiogram is scheduled, pt will need MucoMyst 12 hours before angiogram, NS at 50 mL/hr for 6 hours before angiogram, and NS at 50 mL/hr for 6 hours after angiogram.  - Pt has Do-Not-Resuscitate orders. Form in pt's paper chart and is signed by 2 physicians.  - Pt has not had a bowel movement since 9/24; previous RN posted sticky note to physicians to see if pt can be started on bowel regimen today.  - Pt slept with wife at bedside. Will continue to monitor.

## 2017-09-29 NOTE — PROGRESS NOTES
[facility] aide.   Patient is AAOx4. VSS.   R femoral groin dressing CDI, +2 pulses bilaterally.   No acute distress noted. Will continue to monitor.

## 2017-09-29 NOTE — PROGRESS NOTES
[facility] Hospital Medicine  Progress Note    Patient Name: Jose Maria Jr.  MRN: [id number]  Patient Class: IP- Inpatient   Admission Date: 9/25/2017  Length of Stay: 3 days  Attending Physician: Dylan Bedolla MD  Primary Care Provider: Lamar Felix MD    Hospital Medicine Team: [facility] [place] HOSP MED 4 Michael Yanez MD    Subjective:     Principal Problem:NSTEMI (non-ST elevated myocardial infarction)    HPI:  Mr. Maria is a 84 y/o gentleman with PMHx  MI x 2 s/p CABG, DM, CKD4, HTN, HLD, Hypothyroidism, and gout who presents to [facility] [place] [facility] as a transfer from [facility] for evaluation of NSTEMI. Yesterday morning, the patient started experiencing generalized chest and abdominal tightness 7/10 in severity.  It was exacerbated by exertion and relieved with rest, and was associated with SOB and a cough productive of clear sputum. He denied having diaphoresis or associated radiation of his chest tightness. He otherwise denies any fevers, chills, n/v/d, or dysuria.     Mr. Maria was recently admitted by Dr. Gorman for NSTEMI, and states his pain yesterday was similar to the pain he had on that admission. He was treated medically at that time due to his renal function. The patient does not want to be on dialysis, and deferred angiogram from cardiology on this admission due to the likelihood of him receiving dialysis s/p angiogram. The risk of death due to large myocardial infarctions was explained to Mr. Maria, however he still deferred angiogram and PCI and opted for medical management.           Hospital Course:  Mr. Maria was admitted to Hospital Medicine for NSTEMI (troponin of 5) as he deferred angiogram from cardiology. Patient was started on aspirin, ticagrelor, heparin gtt, metoprolol succinate and rosuvastatin. Troponins peaked at 10 over night and began to trend downward today. He denied having any further episodes of chest pain, however on 9/27 the patient complained of SOB and and  appeared volume overloaded. He was given lasix 80 IV BID x 4 doses.   9/28: Patient has decided to pursue possible angiogram. Consulted cardiology who feels cath is warranted and agrees with consult to interventional cardiology and nephrology. Interventional cardiology wants a stress test before proceeding with angiogram given concern for putting patient on dialysis.     Interval History: NAEON. Patient received nuclear stress test yesterday which was positive for ischemia. Patient evaluated by nephrology yesterday who feels patient is ok for heart cath. Vascular surgery evaluated patient's fistula and feel it is ok to use for dialysis. Cardiology plans to take patient back for diagnostic cath this evening.     Review of Systems   Constitutional: Negative for chills, diaphoresis and fever.   HENT: Negative for congestion and sore throat.    Eyes: Negative for photophobia and visual disturbance.   Respiratory: Negative for chest tightness and shortness of breath.    Cardiovascular: Negative for chest pain and palpitations.   Gastrointestinal: Negative for abdominal pain, nausea and vomiting.   Genitourinary: Negative for dysuria and hematuria.   Musculoskeletal: Negative for arthralgias and myalgias.   Skin: Negative for color change and rash.   Neurological: Negative for light-headedness and headaches.     Objective:     Vital Signs (Most Recent):  Temp: 97.5 °F (36.4 °C) (09/29/17 0800)  Pulse: 83 (09/29/17 0800)  Resp: 18 (09/29/17 0800)  BP: (!) 152/68 (09/29/17 0800)  SpO2: 97 % (09/29/17 0800) Vital Signs (24h Range):  Temp:  [97.5 °F (36.4 °C)-98.5 °F (36.9 °C)] 97.5 °F (36.4 °C)  Pulse:  [61-83] 83  Resp:  [18-20] 18  SpO2:  [95 %-97 %] 97 %  BP: (116-152)/(55-68) 152/68     Weight: 76.1 kg (167 lb 12.3 oz)  Body mass index is 24.78 kg/m².    Intake/Output Summary (Last 24 hours) at 09/29/17 1127  Last data filed at 09/29/17 1000   Gross per 24 hour   Intake              600 ml   Output             2455 ml    Net            -1855 ml      Physical Exam   Constitutional: He is oriented to person, place, and time. He appears well-developed and well-nourished. No distress.   HENT:   Head: Normocephalic and atraumatic.   Mouth/Throat: Oropharynx is clear and moist.   Eyes: EOM are normal. Pupils are equal, round, and reactive to light.   Neck: Neck supple. No JVD present.   Cardiovascular: Normal rate, regular rhythm and intact distal pulses.    Murmur heard.  Pulmonary/Chest: Effort normal. No respiratory distress. He has no wheezes. He has no rales.   Abdominal: Soft. Bowel sounds are normal. He exhibits no distension. There is no tenderness. There is no guarding.   Musculoskeletal: Normal range of motion.   Bilateral lower extremity 1+ pitting edema up to shins.    Neurological: He is alert and oriented to person, place, and time.   Skin: Skin is warm and dry. Capillary refill takes less than 2 seconds. He is not diaphoretic.       Significant Labs:   Recent Results (from the past 24 hour(s))   Comprehensive metabolic panel    Collection Time: 09/29/17  4:17 AM   Result Value Ref Range    Sodium 140 136 - 145 mmol/L    Potassium 3.6 3.5 - 5.1 mmol/L    Chloride 107 95 - 110 mmol/L    CO2 21 (L) 23 - 29 mmol/L    Glucose 99 70 - 110 mg/dL    BUN, Bld 90 (H) 8 - 23 mg/dL    Creatinine 5.5 (H) 0.5 - 1.4 mg/dL    Calcium 8.8 8.7 - 10.5 mg/dL    Total Protein 5.7 (L) 6.0 - 8.4 g/dL    Albumin 2.9 (L) 3.5 - 5.2 g/dL    Total Bilirubin 0.5 0.1 - 1.0 mg/dL    Alkaline Phosphatase 53 (L) 55 - 135 U/L    AST 8 (L) 10 - 40 U/L    ALT 14 10 - 44 U/L    Anion Gap 12 8 - 16 mmol/L    eGFR if African American 10.1 (A) >60 mL/min/1.73 m^2    eGFR if non  8.7 (A) >60 mL/min/1.73 m^2   Magnesium    Collection Time: 09/29/17  4:17 AM   Result Value Ref Range    Magnesium 1.8 1.6 - 2.6 mg/dL   Phosphorus    Collection Time: 09/29/17  4:17 AM   Result Value Ref Range    Phosphorus 4.4 2.7 - 4.5 mg/dL   CBC auto differential     Collection Time: 09/29/17  9:32 AM   Result Value Ref Range    WBC 5.20 3.90 - 12.70 K/uL    RBC 3.13 (L) 4.60 - 6.20 M/uL    Hemoglobin 9.7 (L) 14.0 - 18.0 g/dL    Hematocrit 29.7 (L) 40.0 - 54.0 %    MCV 95 82 - 98 fL    MCH 31.0 27.0 - 31.0 pg    MCHC 32.7 32.0 - 36.0 g/dL    RDW 15.0 (H) 11.5 - 14.5 %    Platelets 143 (L) 150 - 350 K/uL    MPV 12.1 9.2 - 12.9 fL    Gran # 3.6 1.8 - 7.7 K/uL    Lymph # 1.2 1.0 - 4.8 K/uL    Mono # 0.2 (L) 0.3 - 1.0 K/uL    Eos # 0.2 0.0 - 0.5 K/uL    Baso # 0.01 0.00 - 0.20 K/uL    Gran% 68.6 38.0 - 73.0 %    Lymph% 23.3 18.0 - 48.0 %    Mono% 4.6 4.0 - 15.0 %    Eosinophil% 2.9 0.0 - 8.0 %    Basophil% 0.2 0.0 - 1.9 %    Differential Method Automated          Significant Imaging:     Nuclear stress test:   1. There is evidence for a large sized area of moderate myocardial ischemia that extends from the base to the apical inferior and nferolateral wall of the left ventricle with underlying injury present.   2. There is evidence for a small to moderate sized area of mild myocardial ischemia in the mid to apical lateral wall of the left ventricle.  3. There is evidence for a small sized area of mild myocardial ischemia in the mid anterior wall of the left ventricle consistent with diagonal disease.    4. Resting wall motion is physiologic.   5. Resting LV function is normal.  (normal is >= 51%)  6. The left ventricular volume is mildly increased at rest.   7. The extracardiac distribution of radioactivity is normal.   8. There was no previous study available to compare.    Assessment/Plan:      * NSTEMI (non-ST elevated myocardial infarction)    - Patient presents with 1 day history of chest tightness, EKG with ST depression in inferior leads, and a troponin of 5.   - Patient initially refused angiogram as it may put him at risk for dialysis, which he did  not want.  The risk of death due to large myocardial infarctions was explained to Juan C Candace, however he still deferred  angiogram and PCI for medical management. Currently patient now wanting angiogram- pending SPECT stress test as per interventional cardiology and nephro consult  - Continue ASA 81 mg QD, ticagrelor 90 mg BID, and heparin gtt x 48 hours to finish today  - Metoprolol succinate 50 mg QD  - Rosuvastatin 10 mg QD (renally dosed).   - SPECT stress test was positive for myocardial ischemia.  - Cardiology plans to take patient for heart cath this evening.   - Nephrology evaluated patient yesterday and felt he was ok for heart cath.  - Per nephrology, mucomyst 12 hrs prior to heart cath and IV NS 50 cc/hr for 6 hours prior and post heart cath.   - Will f/u cardiology recs s/p heart cath.           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 Up from baseline of 3.9  - Could represent gradual worsening of CKD as patient refuses dialysis or 2/2 cardiorenal syndrome given NSTEMI and worsening CHF.   - Continue medical management of HTN, CHF, and NSTEMI.  - Sevelamer 1600 mg PO QD with meals.  - Sodium bicarb 650 mg PO QD.   - Edd continue to monitor renal function.   - Nephrology evaluated patient and feel he is ok for dialysis. Recommended vascular consult to assess patency of right arm fistula.   - Vascular surgery evaluated fistula and felt it was ok to use. Will order vascular u/s of fistula to further assess flow rate.    - Per nephrology, mucomyst 12 hrs prior to heart cath and IV NS 50 cc/hr for 6 hours prior and post heart cath.           Chronic systolic heart failure    - Patient had an echo on 9/8/17 which showed an EF of 50%  - Presented to the ED yesterday with a BNP of 3900 and pulmonary edema on chest x-ray in the setting of NSTEMI.  - 2D echo yesterday showed EF 50%, elevated PA pressures, and mild RV systolic dysfunction.   - SOB has resolved. Patient completed lasix 80 IV BID x 4 doses.   - Continue Metoprolol succinate 50 mg QD.           Gastroesophageal reflux disease  without esophagitis    - Protonix 40 mg QD.           BPH (benign prostatic hyperplasia)    - Continue home flomax 0.4 mg QD.  - Finasteride 5 mg QD.            Gout    - Continue home allopurinol 100 mg QD.           Acquired hypothyroidism    - Continue home synthroid 25 mcg QD.           Anemia of chronic renal failure, stage 5    - Patient received Epo injections every 14 days as an outpatient.  - Hgb currently stable at 9.7  - Will continue to monitor daily CBC.           HLD (hyperlipidemia)    - rosuvastatin 10 mg QD (renally dosed).           Essential hypertension    - isosorbide mononitrate 120 mg PO QD.   - hydralazine 75 mg PO TID.  - metoprolol succinate 50 mg QD.         CAD (coronary artery disease), native coronary artery    - ASA 81 mg QD.  - Rosuvastatin 10 mg QD (renally dosed).             VTE Risk Mitigation         Ordered     heparin (porcine) injection 5,000 Units  Every 8 hours     Route:  Subcutaneous        09/29/17 0729     High Risk of VTE  Once      09/26/17 0001     Reason for No Pharmacological VTE Prophylaxis  Once      09/26/17 0001              Michael Yanez MD PGY-1   Department of Hospital Medicine   [facility]

## 2017-09-29 NOTE — SUBJECTIVE & OBJECTIVE
Interval History: NAEON. Patient received nuclear stress test yesterday which was positive for ischemia. Patient evaluated by nephrology yesterday who feels patient is ok for heart cath. Vascular surgery evaluated patient's fistula and feel it is ok to use for dialysis. Cardiology plans to take patient back for diagnostic cath this evening.     Review of Systems   Constitutional: Negative for chills, diaphoresis and fever.   HENT: Negative for congestion and sore throat.    Eyes: Negative for photophobia and visual disturbance.   Respiratory: Negative for chest tightness and shortness of breath.    Cardiovascular: Negative for chest pain and palpitations.   Gastrointestinal: Negative for abdominal pain, nausea and vomiting.   Genitourinary: Negative for dysuria and hematuria.   Musculoskeletal: Negative for arthralgias and myalgias.   Skin: Negative for color change and rash.   Neurological: Negative for light-headedness and headaches.     Objective:     Vital Signs (Most Recent):  Temp: 97.5 °F (36.4 °C) (09/29/17 0800)  Pulse: 83 (09/29/17 0800)  Resp: 18 (09/29/17 0800)  BP: (!) 152/68 (09/29/17 0800)  SpO2: 97 % (09/29/17 0800) Vital Signs (24h Range):  Temp:  [97.5 °F (36.4 °C)-98.5 °F (36.9 °C)] 97.5 °F (36.4 °C)  Pulse:  [61-83] 83  Resp:  [18-20] 18  SpO2:  [95 %-97 %] 97 %  BP: (116-152)/(55-68) 152/68     Weight: 76.1 kg (167 lb 12.3 oz)  Body mass index is 24.78 kg/m².    Intake/Output Summary (Last 24 hours) at 09/29/17 1127  Last data filed at 09/29/17 1000   Gross per 24 hour   Intake              600 ml   Output             2455 ml   Net            -1855 ml      Physical Exam   Constitutional: He is oriented to person, place, and time. He appears well-developed and well-nourished. No distress.   HENT:   Head: Normocephalic and atraumatic.   Mouth/Throat: Oropharynx is clear and moist.   Eyes: EOM are normal. Pupils are equal, round, and reactive to light.   Neck: Neck supple. No JVD present.    Cardiovascular: Normal rate, regular rhythm and intact distal pulses.    Murmur heard.  Pulmonary/Chest: Effort normal. No respiratory distress. He has no wheezes. He has no rales.   Abdominal: Soft. Bowel sounds are normal. He exhibits no distension. There is no tenderness. There is no guarding.   Musculoskeletal: Normal range of motion.   Bilateral lower extremity 1+ pitting edema up to shins.    Neurological: He is alert and oriented to person, place, and time.   Skin: Skin is warm and dry. Capillary refill takes less than 2 seconds. He is not diaphoretic.       Significant Labs:   Recent Results (from the past 24 hour(s))   Comprehensive metabolic panel    Collection Time: 09/29/17  4:17 AM   Result Value Ref Range    Sodium 140 136 - 145 mmol/L    Potassium 3.6 3.5 - 5.1 mmol/L    Chloride 107 95 - 110 mmol/L    CO2 21 (L) 23 - 29 mmol/L    Glucose 99 70 - 110 mg/dL    BUN, Bld 90 (H) 8 - 23 mg/dL    Creatinine 5.5 (H) 0.5 - 1.4 mg/dL    Calcium 8.8 8.7 - 10.5 mg/dL    Total Protein 5.7 (L) 6.0 - 8.4 g/dL    Albumin 2.9 (L) 3.5 - 5.2 g/dL    Total Bilirubin 0.5 0.1 - 1.0 mg/dL    Alkaline Phosphatase 53 (L) 55 - 135 U/L    AST 8 (L) 10 - 40 U/L    ALT 14 10 - 44 U/L    Anion Gap 12 8 - 16 mmol/L    eGFR if African American 10.1 (A) >60 mL/min/1.73 m^2    eGFR if non  8.7 (A) >60 mL/min/1.73 m^2   Magnesium    Collection Time: 09/29/17  4:17 AM   Result Value Ref Range    Magnesium 1.8 1.6 - 2.6 mg/dL   Phosphorus    Collection Time: 09/29/17  4:17 AM   Result Value Ref Range    Phosphorus 4.4 2.7 - 4.5 mg/dL   CBC auto differential    Collection Time: 09/29/17  9:32 AM   Result Value Ref Range    WBC 5.20 3.90 - 12.70 K/uL    RBC 3.13 (L) 4.60 - 6.20 M/uL    Hemoglobin 9.7 (L) 14.0 - 18.0 g/dL    Hematocrit 29.7 (L) 40.0 - 54.0 %    MCV 95 82 - 98 fL    MCH 31.0 27.0 - 31.0 pg    MCHC 32.7 32.0 - 36.0 g/dL    RDW 15.0 (H) 11.5 - 14.5 %    Platelets 143 (L) 150 - 350 K/uL    MPV 12.1 9.2 - 12.9  fL    Gran # 3.6 1.8 - 7.7 K/uL    Lymph # 1.2 1.0 - 4.8 K/uL    Mono # 0.2 (L) 0.3 - 1.0 K/uL    Eos # 0.2 0.0 - 0.5 K/uL    Baso # 0.01 0.00 - 0.20 K/uL    Gran% 68.6 38.0 - 73.0 %    Lymph% 23.3 18.0 - 48.0 %    Mono% 4.6 4.0 - 15.0 %    Eosinophil% 2.9 0.0 - 8.0 %    Basophil% 0.2 0.0 - 1.9 %    Differential Method Automated          Significant Imaging:     Nuclear stress test:   1. There is evidence for a large sized area of moderate myocardial ischemia that extends from the base to the apical inferior and nferolateral wall of the left ventricle with underlying injury present.   2. There is evidence for a small to moderate sized area of mild myocardial ischemia in the mid to apical lateral wall of the left ventricle.  3. There is evidence for a small sized area of mild myocardial ischemia in the mid anterior wall of the left ventricle consistent with diagonal disease.    4. Resting wall motion is physiologic.   5. Resting LV function is normal.  (normal is >= 51%)  6. The left ventricular volume is mildly increased at rest.   7. The extracardiac distribution of radioactivity is normal.   8. There was no previous study available to compare.

## 2017-09-30 VITALS
SYSTOLIC BLOOD PRESSURE: 152 MMHG | WEIGHT: 167.75 LBS | HEART RATE: 70 BPM | OXYGEN SATURATION: 95 % | RESPIRATION RATE: 18 BRPM | BODY MASS INDEX: 24.85 KG/M2 | DIASTOLIC BLOOD PRESSURE: 66 MMHG | HEIGHT: 69 IN | TEMPERATURE: 98 F

## 2017-09-30 LAB
ALBUMIN SERPL BCP-MCNC: 2.8 G/DL
ALP SERPL-CCNC: 49 U/L
ALT SERPL W/O P-5'-P-CCNC: 12 U/L
ANION GAP SERPL CALC-SCNC: 12 MMOL/L
AST SERPL-CCNC: 7 U/L
BASOPHILS # BLD AUTO: 0.02 K/UL
BASOPHILS NFR BLD: 0.4 %
BILIRUB SERPL-MCNC: 0.4 MG/DL
BUN SERPL-MCNC: 88 MG/DL
CALCIUM SERPL-MCNC: 8.6 MG/DL
CHLORIDE SERPL-SCNC: 107 MMOL/L
CO2 SERPL-SCNC: 22 MMOL/L
CREAT SERPL-MCNC: 5.9 MG/DL
DIFFERENTIAL METHOD: ABNORMAL
EOSINOPHIL # BLD AUTO: 0.2 K/UL
EOSINOPHIL NFR BLD: 3.3 %
ERYTHROCYTE [DISTWIDTH] IN BLOOD BY AUTOMATED COUNT: 15.2 %
EST. GFR  (AFRICAN AMERICAN): 9.2 ML/MIN/1.73 M^2
EST. GFR  (NON AFRICAN AMERICAN): 8 ML/MIN/1.73 M^2
GLUCOSE SERPL-MCNC: 113 MG/DL
HCT VFR BLD AUTO: 28 %
HGB BLD-MCNC: 9.1 G/DL
LYMPHOCYTES # BLD AUTO: 1.2 K/UL
LYMPHOCYTES NFR BLD: 23.2 %
MAGNESIUM SERPL-MCNC: 1.8 MG/DL
MCH RBC QN AUTO: 31 PG
MCHC RBC AUTO-ENTMCNC: 32.5 G/DL
MCV RBC AUTO: 95 FL
MONOCYTES # BLD AUTO: 0.3 K/UL
MONOCYTES NFR BLD: 5.9 %
NEUTROPHILS # BLD AUTO: 3.5 K/UL
NEUTROPHILS NFR BLD: 66.8 %
PHOSPHATE SERPL-MCNC: 4.4 MG/DL
PLATELET # BLD AUTO: 131 K/UL
PMV BLD AUTO: 11.9 FL
POTASSIUM SERPL-SCNC: 3.9 MMOL/L
PROT SERPL-MCNC: 5.8 G/DL
RBC # BLD AUTO: 2.94 M/UL
SODIUM SERPL-SCNC: 141 MMOL/L
WBC # BLD AUTO: 5.22 K/UL

## 2017-09-30 PROCEDURE — 85025 COMPLETE CBC W/AUTO DIFF WBC: CPT

## 2017-09-30 PROCEDURE — G0008 ADMIN INFLUENZA VIRUS VAC: HCPCS | Performed by: INTERNAL MEDICINE

## 2017-09-30 PROCEDURE — 83735 ASSAY OF MAGNESIUM: CPT

## 2017-09-30 PROCEDURE — 84100 ASSAY OF PHOSPHORUS: CPT

## 2017-09-30 PROCEDURE — 25000003 PHARM REV CODE 250: Performed by: STUDENT IN AN ORGANIZED HEALTH CARE EDUCATION/TRAINING PROGRAM

## 2017-09-30 PROCEDURE — 63600175 PHARM REV CODE 636 W HCPCS: Performed by: INTERNAL MEDICINE

## 2017-09-30 PROCEDURE — 25000003 PHARM REV CODE 250: Performed by: INTERNAL MEDICINE

## 2017-09-30 PROCEDURE — 99238 HOSP IP/OBS DSCHRG MGMT 30/<: CPT | Mod: GC,,, | Performed by: INTERNAL MEDICINE

## 2017-09-30 PROCEDURE — 90471 IMMUNIZATION ADMIN: CPT | Performed by: INTERNAL MEDICINE

## 2017-09-30 PROCEDURE — 36415 COLL VENOUS BLD VENIPUNCTURE: CPT

## 2017-09-30 PROCEDURE — A4216 STERILE WATER/SALINE, 10 ML: HCPCS | Performed by: STUDENT IN AN ORGANIZED HEALTH CARE EDUCATION/TRAINING PROGRAM

## 2017-09-30 PROCEDURE — 3E0234Z INTRODUCTION OF SERUM, TOXOID AND VACCINE INTO MUSCLE, PERCUTANEOUS APPROACH: ICD-10-PCS | Performed by: INTERNAL MEDICINE

## 2017-09-30 PROCEDURE — 80053 COMPREHEN METABOLIC PANEL: CPT

## 2017-09-30 PROCEDURE — 90662 IIV NO PRSV INCREASED AG IM: CPT | Performed by: INTERNAL MEDICINE

## 2017-09-30 RX ORDER — ROSUVASTATIN CALCIUM 10 MG/1
10 TABLET, COATED ORAL DAILY
Qty: 30 TABLET | Refills: 2 | Status: ON HOLD | OUTPATIENT
Start: 2017-10-01 | End: 2017-10-11

## 2017-09-30 RX ORDER — METOPROLOL SUCCINATE 100 MG/1
100 TABLET, EXTENDED RELEASE ORAL DAILY
Qty: 30 TABLET | Refills: 2 | Status: SHIPPED | OUTPATIENT
Start: 2017-10-01 | End: 2017-11-18 | Stop reason: SDUPTHER

## 2017-09-30 RX ORDER — AMLODIPINE BESYLATE 5 MG/1
5 TABLET ORAL DAILY
Status: DISCONTINUED | OUTPATIENT
Start: 2017-09-30 | End: 2017-09-30 | Stop reason: HOSPADM

## 2017-09-30 RX ORDER — AMLODIPINE BESYLATE 5 MG/1
5 TABLET ORAL DAILY
Qty: 30 TABLET | Refills: 2 | Status: SHIPPED | OUTPATIENT
Start: 2017-10-01 | End: 2017-11-01

## 2017-09-30 RX ADMIN — ASPIRIN 81 MG CHEWABLE TABLET 81 MG: 81 TABLET CHEWABLE at 09:09

## 2017-09-30 RX ADMIN — LEVOTHYROXINE SODIUM 25 MCG: 25 TABLET ORAL at 03:09

## 2017-09-30 RX ADMIN — SODIUM BICARBONATE 650 MG TABLET 650 MG: at 09:09

## 2017-09-30 RX ADMIN — TICAGRELOR 90 MG: 90 TABLET ORAL at 09:09

## 2017-09-30 RX ADMIN — ISOSORBIDE MONONITRATE 120 MG: 30 TABLET, EXTENDED RELEASE ORAL at 09:09

## 2017-09-30 RX ADMIN — HEPARIN SODIUM 5000 UNITS: 5000 INJECTION, SOLUTION INTRAVENOUS; SUBCUTANEOUS at 03:09

## 2017-09-30 RX ADMIN — ALLOPURINOL 100 MG: 100 TABLET ORAL at 09:09

## 2017-09-30 RX ADMIN — PANTOPRAZOLE SODIUM 40 MG: 40 TABLET, DELAYED RELEASE ORAL at 09:09

## 2017-09-30 RX ADMIN — FINASTERIDE 5 MG: 5 TABLET, FILM COATED ORAL at 09:09

## 2017-09-30 RX ADMIN — OMEGA-3-ACID ETHYL ESTERS 1 G: 1 CAPSULE, LIQUID FILLED ORAL at 09:09

## 2017-09-30 RX ADMIN — ROSUVASTATIN CALCIUM 10 MG: 5 TABLET ORAL at 09:09

## 2017-09-30 RX ADMIN — AMLODIPINE BESYLATE 5 MG: 5 TABLET ORAL at 09:09

## 2017-09-30 RX ADMIN — INFLUENZA A VIRUSA/MICHIGAN/45/2015 X-275 (H1N1) ANTIGEN (FORMALDEHYDE INACTIVATED), INFLUENZA A VIRUS A/HONG KONG/4801/2014 X-263B (H3N2) ANTIGEN (FORMALDEHYDE INACTIVATED), AND INFLUENZA B VIRUS B/BRISBANE/60/2008 ANTIGEN (FORMALDEHYDE INACTIVATED) 0.5 ML: 60; 60; 60 INJECTION, SUSPENSION INTRAMUSCULAR at 11:09

## 2017-09-30 RX ADMIN — METOPROLOL SUCCINATE 100 MG: 100 TABLET, EXTENDED RELEASE ORAL at 09:09

## 2017-09-30 RX ADMIN — TAMSULOSIN HYDROCHLORIDE 0.4 MG: 0.4 CAPSULE ORAL at 09:09

## 2017-09-30 RX ADMIN — DOCUSATE SODIUM 50 MG: 50 CAPSULE, LIQUID FILLED ORAL at 09:09

## 2017-09-30 RX ADMIN — Medication 3 ML: at 03:09

## 2017-09-30 RX ADMIN — SEVELAMER CARBONATE 1600 MG: 800 TABLET, FILM COATED ORAL at 09:09

## 2017-09-30 NOTE — DISCHARGE SUMMARY
[facility] Hospital Medicine  Discharge Summary      Patient Name: Jose Maria Jr.  MRN: [id number]  Admission Date: 9/25/2017  Hospital Length of Stay: 4 days  Discharge Date and Time:  09/30/2017 10:58 AM  Attending Physician: Dylan Bedolla MD   Discharging Provider: Michael Yanez MD  Primary Care Provider: Lamar Felix MD  Hospital Medicine Team: [facility] HOSP MED 4 Michael Yanez MD    HPI:   Mr. Maria is a 86 y/o gentleman with PMHx  MI x 2 s/p CABG, DM, CKD4, HTN, HLD, Hypothyroidism, and gout who presents to [facility] as a transfer from [facility] for evaluation of NSTEMI. Yesterday morning, the patient started experiencing generalized chest and abdominal tightness 7/10 in severity.  It was exacerbated by exertion and relieved with rest, and was associated with SOB and a cough productive of clear sputum. He denied having diaphoresis or associated radiation of his chest tightness. He otherwise denies any fevers, chills, n/v/d, or dysuria.     Mr. Maria was recently admitted by Dr. Gorman for NSTEMI, and states his pain yesterday was similar to the pain he had on that admission. He was treated medically at that time due to his renal function. The patient does not want to be on dialysis, and deferred angiogram from cardiology on this admission due to the likelihood of him receiving dialysis s/p angiogram. The risk of death due to large myocardial infarctions was explained to Mr. Maria, however he still deferred angiogram and PCI and opted for medical management.           Procedure(s) (LRB):  ANGIOGRAM-CORONARY (N/A)      Indwelling Lines/Drains at time of discharge:   Lines/Drains/Airways     Drain                 Hemodialysis AV Fistula Right upper arm -- days              Hospital Course:   Mr. Maria was admitted to Hospital Medicine for NSTEMI (troponin of 5) as he deferred angiogram from cardiology. Patient was started on aspirin, ticagrelor, heparin gtt, metoprolol succinate  and rosuvastatin. Troponins peaked at 10 over night and began to trend downward today. He denied having any further episodes of chest pain, however on 9/27 the patient complained of SOB and and appeared volume overloaded. He was given lasix 80 IV BID x 4 doses with resolution of symptoms.   9/28: Patient has decided to pursue possible angiogram. Consulted cardiology who feels cath is warranted and agrees with consult to interventional cardiology and nephrology. Interventional cardiology wants a stress test before proceeding with angiogram given concern for putting patient on dialysis. SPECT stress test was positive for ischemia. Nephrology OK'd patient for heart cath. Patient received heart cath on 9/29 which showed 3 vessel disease. He maintained good UOP s/p cath and there were no acute indications for inpatient dialysis. He will begin cardiac rehab post discharge and follow up with Dr. Chu with interventional cardiology for further assessment for stent placement. Patient will also follow up with nephrology as an outpatient to further assess indications for dialysis. Patient was discharged to home on 9/30/17 with continued medical management of NSTEMI.      Patient was examined today prior to discharge. Patient received left heart cath yesterday with no complications. Patient states he is feeling well today and has no new complaints.     Review of Systems   Constitutional: Negative for chills, diaphoresis and fever.   HENT: Negative for congestion and sore throat.    Eyes: Negative for photophobia and visual disturbance.   Respiratory: Negative for chest tightness and shortness of breath.    Cardiovascular: Negative for chest pain and palpitations.   Gastrointestinal: Negative for abdominal pain, nausea and vomiting.   Genitourinary: Negative for dysuria and hematuria.   Musculoskeletal: Negative for arthralgias and myalgias.   Skin: Negative for color change and rash.   Neurological: Negative for  light-headedness and headaches.    Vitals:    09/30/17 0700 09/30/17 0930 09/30/17 0933 09/30/17 1050   BP:  (!) 152/66     BP Location:  Left arm     Patient Position:  Lying     Pulse: (!) 59 63  70   Resp:  18     Temp:  98.2 °F (36.8 °C)     TempSrc:  Oral     SpO2:  97% 95%    Weight:       Height:         Physical Exam   Constitutional: He is oriented to person, place, and time. He appears well-developed and well-nourished. No distress.   HENT:   Head: Normocephalic and atraumatic.   Mouth/Throat: Oropharynx is clear and moist.   Eyes: EOM are normal. Pupils are equal, round, and reactive to light.   Neck: Neck supple. No JVD present.   Cardiovascular: Normal rate, regular rhythm and intact distal pulses.    Murmur heard.  Pulmonary/Chest: Effort normal. No respiratory distress. He has no wheezes. He has no rales.   Abdominal: Soft. Bowel sounds are normal. He exhibits no distension. There is no tenderness. There is no guarding.   Musculoskeletal: Normal range of motion. No edema    Neurological: He is alert and oriented to person, place, and time.   Skin: Skin is warm and dry. Capillary refill takes less than 2 seconds. He is not diaphoretic.     Consults:   Consults         Status Ordering Provider     Inpatient consult to Cardiology  Once     Provider:  (Not yet assigned)    RICHMOND Haywood     Inpatient consult to Interventional Cardiology  Once     Provider:  (Not yet assigned)    PATRICK Perkins     Inpatient consult to Nephrology  Once     Provider:  (Not yet assigned)    RICHMOND Haywood     Inpatient consult to Vascular Surgery  Once     Provider:  (Not yet assigned)    RICHMOND Haywood          Significant Diagnostic Studies: Labs:   BMP:   Recent Labs  Lab 09/29/17  0417 09/30/17  0350   GLU 99 113*    141   K 3.6 3.9    107   CO2 21* 22*   BUN 90* 88*   CREATININE 5.5* 5.9*   CALCIUM 8.8 8.6*   MG 1.8 1.8   , CMP   Recent Labs  Lab 09/29/17  0769  "09/30/17  0350    141   K 3.6 3.9    107   CO2 21* 22*   GLU 99 113*   BUN 90* 88*   CREATININE 5.5* 5.9*   CALCIUM 8.8 8.6*   PROT 5.7* 5.8*   ALBUMIN 2.9* 2.8*   BILITOT 0.5 0.4   ALKPHOS 53* 49*   AST 8* 7*   ALT 14 12   ANIONGAP 12 12   ESTGFRAFRICA 10.1* 9.2*   EGFRNONAA 8.7* 8.0*   , CBC   Recent Labs  Lab 09/29/17  0932 09/30/17  0350   WBC 5.20 5.22   HGB 9.7* 9.1*   HCT 29.7* 28.0*   * 131*    and INR   Lab Results   Component Value Date    INR 1.1 09/25/2017    INR 1.0 09/08/2017    INR 1.06 04/18/2016     Microbiology:   Blood Culture   Lab Results   Component Value Date    LABBLOO No growth after 5 days. 01/20/2017     Left heart cath" 3 vessel coronary artery disease.     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 [I12.0, N18.5] 04/07/2016 Yes    Chronic systolic heart failure [I50.22] 09/26/2017 Yes    CKD (chronic kidney disease) stage 4, GFR 15-29 ml/min [N18.4] 09/29/2017 Yes    Coronary artery disease involving coronary bypass graft of native heart with angina pectoris [I25.709] 09/28/2017 Yes    Gastroesophageal reflux disease without esophagitis [K21.9] 09/09/2017 Yes    Prediabetes [R73.03] 06/09/2017 Yes    Gout [M10.9]  Yes    BPH (benign prostatic hyperplasia) [N40.0]  Yes    Acquired hypothyroidism [E03.9] 12/26/2016 Yes    HLD (hyperlipidemia) [E78.5] 04/07/2016 Yes    Anemia of chronic renal failure, stage 5 [N18.5, D63.1] 04/07/2016 Yes    Essential hypertension [I10] 04/07/2016 Yes    CAD (coronary artery disease), native coronary artery [I25.10] 07/03/2013 Yes      Problems Resolved During this Admission:    Diagnosis Date Noted Date Resolved POA    Elevated troponin level [R74.8] 12/26/2016 09/30/2017 Yes    AP (angina pectoris) [I20.9] 07/03/2013 09/30/2017 Yes    "   * NSTEMI (non-ST elevated myocardial infarction)    - Patient presents with 1 day history of chest tightness, EKG with ST depression in inferior leads, and a troponin of 5.   - Patient initially refused angiogram as it may put him at risk for dialysis, which he did  not want.  The risk of death due to large myocardial infarctions was explained to Mr. Maria, however he still deferred angiogram and PCI for medical management. Currently patient now wanting angiogram- pending SPECT stress test as per interventional cardiology and nephro consult  - Continue ASA 81 mg QD, ticagrelor 90 mg BID, and heparin gtt x 48 hours to finish today  - Metoprolol succinate 100 mg QD  - Rosuvastatin 10 mg QD (renally dosed).   - SPECT stress test was positive for myocardial ischemia.  - S/P heart cath yesterday evening, which showed 3 vessel disease.  - Patient will follow up with interventional cards as an outpatient to further assess need for PCI.  - He will be referred to cardiac rehab phase II at discharge as well.              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 Up from baseline of 3.9  - Could represent gradual worsening of CKD as patient refuses dialysis or 2/2 cardiorenal syndrome given NSTEMI and worsening CHF.   - Continue medical management of HTN, CHF, and NSTEMI.  - Sevelamer 1600 mg PO QD with meals.  - Sodium bicarb 650 mg PO QD.   - Edd continue to monitor renal function.   - Nephrology evaluated patient and feel he is ok for dialysis. Recommended vascular consult to assess patency of right arm fistula.   - Vascular surgery evaluated fistula and felt it was ok to use. Will order vascular u/s of fistula to further assess flow rate.    - Per nephrology, mucomyst 12 hrs prior to heart cath and IV NS 50 cc/hr for 6 hours prior and post heart cath.  - Patient continues to have good UOP s/p heart cath with no acute indications for inpatient dialysis.  - He will follow up with  nephrology as an outpatient to further assess need for dialysis.            Chronic systolic heart failure    - Patient had an echo on 9/8/17 which showed an EF of 50%  - Presented to the ED yesterday with a BNP of 3900 and pulmonary edema on chest x-ray in the setting of NSTEMI.  - 2D echo yesterday showed EF 50%, elevated PA pressures, and mild RV systolic dysfunction.   - SOB has resolved. Patient completed lasix 80 IV BID x 4 doses.   - Continue Metoprolol succinate 100mg QD.           Essential hypertension    - isosorbide mononitrate 120 mg PO QD.   - hydralazine 75 mg PO TID.  - metoprolol succinate 100 mg QD.         CAD (coronary artery disease), native coronary artery    - ASA 81 mg QD.  - Rosuvastatin 10 mg QD (renally dosed).               Discharged Condition: stable    Disposition: Home or Self Care    Follow Up:  Follow-up Information     Khari Chu MD In 2 weeks.    Specialties:  Cardiology, INTERVENTIONAL CARDIOLOGY  Why:  Performed heart cath as an inpatient. F/u up for PCI.   Contact information:  [address]  [facility] [address]  [phone]             Kai Iniguez DO In 1 week.    Specialty:  Nephrology  Why:  CKD s/p heart cath. Potentially needs dialysis  Contact information:  [address]  [facility] [address]  [phone]                 Patient Instructions:     Ambulatory Referral to Nephrology   Referral Priority: Routine Referral Type: Consultation   Referral Reason: Specialty Services Required    Requested Specialty: Nephrology    Number of Visits Requested: 1      Cardiac rehab phase ii   Standing Status: Future  Standing Exp. Date: 09/30/18   Order Specific Question Answer Comments   Department  Whichever is closest to patient's home   Select qualifying diagnosis: I21.4 - Non-ST elevation (NSTEMI) myocardial infarction        Medications:  Reconciled Home Medications:   Current Discharge Medication List      START taking these medications    Details    amlodipine (NORVASC) 5 MG tablet Take 1 tablet (5 mg total) by mouth once daily.  Qty: 30 tablet, Refills: 2      metoprolol succinate (TOPROL-XL) 100 MG 24 hr tablet Take 1 tablet (100 mg total) by mouth once daily.  Qty: 30 tablet, Refills: 2      ticagrelor (BRILINTA) 90 mg tablet Take 1 tablet (90 mg total) by mouth 2 (two) times daily.  Qty: 30 tablet, Refills: 2         CONTINUE these medications which have CHANGED    Details   rosuvastatin (CRESTOR) 10 MG tablet Take 1 tablet (10 mg total) by mouth once daily.  Qty: 30 tablet, Refills: 2         CONTINUE these medications which have NOT CHANGED    Details   ACCU-CHEK TANIKA PLUS TEST STRP Strp once daily.       allopurinol (ZYLOPRIM) 100 MG tablet Take 1 tablet (100 mg total) by mouth once daily.  Qty: 30 tablet, Refills: 0      alprazolam (XANAX) 0.25 MG tablet Take 1 tablet (0.25 mg total) by mouth nightly as needed for Anxiety.  Qty: 60 tablet, Refills: 0      aspirin 81 MG Chew Take 81 mg by mouth once daily.      calcitRIOL (ROCALTROL) 0.25 MCG Cap Take 1 capsule (0.25 mcg total) by mouth every Mon, Wed, Fri.  Qty: 12 capsule, Refills: 11      finasteride (PROSCAR) 5 mg tablet Take 5 mg by mouth once daily.      furosemide (LASIX) 40 MG tablet Take 1 tablet (40 mg total) by mouth once daily.  Qty: 30 tablet, Refills: 11      hydrALAZINE (APRESOLINE) 25 MG tablet Take 3 tablets (75 mg total) by mouth 3 (three) times daily.  Qty: 270 tablet, Refills: 11      isosorbide mononitrate (IMDUR) 120 MG 24 hr tablet Take 1 tablet (120 mg total) by mouth once daily.  Qty: 30 tablet, Refills: 11      levothyroxine (SYNTHROID) 25 MCG tablet Take 1 tablet (25 mcg total) by mouth once daily.  Qty: 90 tablet, Refills: 3      multivitamin capsule Take 1 capsule by mouth once daily.      nitroGLYCERIN (NITROSTAT) 0.4 MG SL tablet Place 1 tablet (0.4 mg total) under the tongue every 5 (five) minutes as needed for Chest pain.  Qty: 25 tablet, Refills: 1      omega-3 acid  ethyl esters (LOVAZA) 1 gram capsule Take 1 capsule (1 g total) by mouth 2 (two) times daily.  Qty: 60 capsule, Refills: 3      omeprazole (PRILOSEC) 20 MG capsule Take 1 capsule (20 mg total) by mouth once daily.  Qty: 30 capsule, Refills: 11      sevelamer HCl (RENAGEL) 800 MG Tab Take 2 tablets (1,600 mg total) by mouth 3 (three) times daily with meals.  Qty: 180 tablet, Refills: 11      sodium bicarbonate 650 MG tablet Take 1 tablet (650 mg total) by mouth once daily.  Qty: 90 tablet, Refills: 3      tamsulosin (FLOMAX) 0.4 mg Cp24 Take 1 capsule (0.4 mg total) by mouth once daily.  Qty: 30 capsule, Refills: 11      cyanocobalamin 500 mcg tablet Take 2 tablets (1,000 mcg total) by mouth once daily.         STOP taking these medications       metoprolol tartrate (LOPRESSOR) 25 MG tablet Comments:   Reason for Stopping:             Time spent on the discharge of patient: 30 minutes      Michael Yanez MD  Department of Hospital Medicine  [facility]

## 2017-09-30 NOTE — ASSESSMENT & PLAN NOTE
- Creatinine continues to trend upwards. Up from baseline of 3.9  - Could represent gradual worsening of CKD as patient refuses dialysis or 2/2 cardiorenal syndrome given NSTEMI and worsening CHF.   - Continue medical management of HTN, CHF, and NSTEMI.  - Sevelamer 1600 mg PO QD with meals.  - Sodium bicarb 650 mg PO QD.   - Edd continue to monitor renal function.   - Nephrology evaluated patient and feel he is ok for dialysis. Recommended vascular consult to assess patency of right arm fistula.   - Vascular surgery evaluated fistula and felt it was ok to use. Will order vascular u/s of fistula to further assess flow rate.    - Per nephrology, mucomyst 12 hrs prior to heart cath and IV NS 50 cc/hr for 6 hours prior and post heart cath.  - Patient continues to have good UOP s/p heart cath with no acute indications for inpatient dialysis.  - He will follow up with nephrology as an outpatient to further assess need for dialysis.

## 2017-09-30 NOTE — PLAN OF CARE
Problem: Patient Care Overview  Goal: Plan of Care Review  Outcome: Ongoing (interventions implemented as appropriate)  Plan of care reviewed with patient. Pt verbalized understanding. Pt AAOX4. Pt free from falls, injuries and trauma.  Pt free from skin breakdown.  Pt VSS and in NAD.  Pt afebrile.  Pt had no complaints of SOB, N/V or CP.  Pt had no complaints of pain this shift. Adequate UOP this shift. No BM this shift. No bleeding from rt femoral site. Dressing remained CDI.  Pt had uneventful evening. Pt in low locked bed with personal items and call light within reach. Fall precautions maintained. Wife remained at bedside.

## 2017-09-30 NOTE — ASSESSMENT & PLAN NOTE
- Patient had an echo on 9/8/17 which showed an EF of 50%  - Presented to the ED yesterday with a BNP of 3900 and pulmonary edema on chest x-ray in the setting of NSTEMI.  - 2D echo yesterday showed EF 50%, elevated PA pressures, and mild RV systolic dysfunction.   - SOB has resolved. Patient completed lasix 80 IV BID x 4 doses.   - Continue Metoprolol succinate 100mg QD.

## 2017-09-30 NOTE — ASSESSMENT & PLAN NOTE
- isosorbide mononitrate 120 mg PO QD.   - hydralazine 75 mg PO TID.  - metoprolol succinate 100 mg QD.

## 2017-09-30 NOTE — PROGRESS NOTES
Patient dcd per MD order.   Patient educated on what medications were given today and when they are due tonight and tomorrow.   Patient aware of prescriptions ready for pickup at [facility].   Patient given list of future lab and clinic appointments and aware of need to follow up with nephrology and cardiology.   Patient educated on heart failure medications and low salt diet.   Patient educated on when to call the doctor.   L wrist/forearm 18 gauge peripheral IV's x2 dcd, patient tolerated well.   Patient's wife present for discharge instructions.   Patient and patient's wife verbalized full understanding of discharge instructions, verbalized no further questions at this time.   Patient escorted off unit per wheelchair by grandson. Patient's daughter to provide transportation home.

## 2017-09-30 NOTE — ASSESSMENT & PLAN NOTE
- Patient presents with 1 day history of chest tightness, EKG with ST depression in inferior leads, and a troponin of 5.   - Patient initially refused angiogram as it may put him at risk for dialysis, which he did  not want.  The risk of death due to large myocardial infarctions was explained to Mr. Maria, however he still deferred angiogram and PCI for medical management. Currently patient now wanting angiogram- pending SPECT stress test as per interventional cardiology and nephro consult  - Continue ASA 81 mg QD, ticagrelor 90 mg BID, and heparin gtt x 48 hours to finish today  - Metoprolol succinate 100 mg QD  - Rosuvastatin 10 mg QD (renally dosed).   - SPECT stress test was positive for myocardial ischemia.  - S/P heart cath yesterday evening, which showed 3 vessel disease.  - Patient will follow up with interventional cards as an outpatient to further assess need for PCI.  - He will be referred to cardiac rehab phase II at discharge as well.

## 2017-09-30 NOTE — PROGRESS NOTES
"Notified Dr. Dumont with IM4 of the following:  Patient code status changed to FULL code for angiogram.   Family would like to continue DNR code status, documentation in chart.   MD stated will speak with primary team to make changes.   Patient also refusing IV site change stating he was "tired of being stuck."  MD stated OK to extend L FA 18 g (x2) peripheral IV's until tomorrow, 9/30/17.   Will continue to monitor.   "

## 2017-10-02 ENCOUNTER — PATIENT OUTREACH (OUTPATIENT)
Dept: ADMINISTRATIVE | Facility: CLINIC | Age: 82
End: 2017-10-02

## 2017-10-02 DIAGNOSIS — I25.10 CORONARY ARTERY DISEASE, ANGINA PRESENCE UNSPECIFIED, UNSPECIFIED VESSEL OR LESION TYPE, UNSPECIFIED WHETHER NATIVE OR TRANSPLANTED HEART: Primary | ICD-10-CM

## 2017-10-02 RX ORDER — TRAZODONE HYDROCHLORIDE 50 MG/1
25 TABLET ORAL NIGHTLY
COMMUNITY
End: 2017-11-01

## 2017-10-02 NOTE — PATIENT INSTRUCTIONS
Discharge Instructions for Heart Attack  You have had a heart attack (acute myocardial infarction). A heart attack occurs when a vessel that sends blood to your heart suddenly becomes blocked. This causes your heart not to work as well as it should. Follow these guidelines for home care and lifestyle changes.  Home care  · Take your medicines exactly as directed. Dont skip doses. Talk with your healthcare provider if your medicines aren't working for you. Together you can come up with another treatment plan.  · Remember that recovery after a heart attack takes time. Plan to rest for at least 4 to 8 weeks while you recover. Then return to normal activity when your doctor says its OK.  · Ask your doctor about joining a heart rehabilitation program. This can help strengthen your heart and lungs and give you more energy and confidence.  · Tell your doctor if you are feeling depressed. Feelings of sadness are common after a heart attack. But it is important to speak to someone or seek counseling if you are feeling overwhelmed by these feelings.  · Call 911 right away if you have chest pain or pain that goes to your shoulder, neck, or back. Don't drive yourself to the hospital.  · Ask your family members to learn CPR. This is an important skill that can save lives when it's needed.  · Learn to take your own blood pressure and pulse. Keep a record of your results. Ask your doctor when you should seek emergency medical attention. He or she will tell you which blood pressure reading is dangerous.  Lifestyle changes  Your heart attack might have been caused by cardiovascular disease. Your healthcare provider will work with you to make changes to your lifestyle. This will help the heart disease from getting worse. These changes will most likely be a combination of diet and exercise.  Diet  Your healthcare provider will tell you what changes you need to make to your diet. You may need to see a registered dietitian for help  with these diet changes. These changes may include:  · Cutting back on how much fat and cholesterol you eat  · Cutting back on how much salt (sodium) you eat, especially if you have high blood pressure  · Eating more fresh vegetables and fruits  · Eating lean proteins such as fish, poultry, beans, and peas, and eating less red meat and processed meats  · Using low-fat dairy products  · Using vegetable and nut oils in limited amounts  · Limiting how many sweets and processed foods such as chips, cookies, and baked goods you eat  · Limiting how often you eat out. And when you do eat out, making better food choices.  · Not eating fried or greasy foods, or foods high in saturated fat  Exercise  Your healthcare provider may tell you to get more exercise if you haven't been physically active. Depending on your case, your provider may recommend that you get moderate to vigorous physical activity for at least 40 minutes each day, and for at least 3 to 4 days each week. A few examples of moderate to vigorous activity include:  · Walking at a brisk pace, about 3 to 4 miles per hour  · Jogging or running  · Swimming or water aerobics  · Hiking  · Dancing  · Martial arts  · Tennis  · Riding a bicycle or stationary bike  Other changes  Your healthcare provider may also recommend that you:  · Lose weight. If you are overweight or obese, your provider will work with you to lose extra pounds. Making diet changes and getting more exercise can help. A good goal is to lose your 10% of your body weight in one year.  · Stop smoking. Sign up for a stop-smoking program to make it more likely for you to quit for good. You can join a stop-smoking support group. Or ask your doctor about nicotine replacement products.  · Learn to manage stress. Stress management techniques to help you deal with stress in your home and work life. This will help you feel better emotionally and ease the strain on your heart.  Follow-up  Make a follow-up  appointment as directed by our staff.     When to seek medical advice  Call 911 right away if you have:  · Chest pain that goes to your neck, jaw, back, or shoulder  · Shortness of breath  Otherwise, call your doctor immediately if you have:  · Lightheadedness, dizziness, or fainting  · Feeling of irregular heartbeat or fast pulse.   Date Last Reviewed: 10/1/2016  © 0129-6484 [employer] [address] [place] [address], [place] [address]. All rights reserved. This information is not intended as a substitute for professional medical care. Always follow your healthcare professional's instructions.

## 2017-10-02 NOTE — PROGRESS NOTES
C3 nurse attempted to contact patient. No answer. The following message was left for the patient to return the call:  Good morning I am a nurse calling on behalf of [facility] from the Care Coordination Center.  This is a Transitional Care Call for Jose. When you have a moment please contact us at [phone] or [phone] Monday through Friday, between the hours of 8 am to 4 pm. We look forward to speaking with you. On behalf of [facility] have a nice day.    The patient does not have a scheduled HOSFU appointment within 7-14 days post hospital discharge date 09/30/17. Message sent to Physician staff to assist with HOSFU appointment scheduling.

## 2017-10-03 ENCOUNTER — TELEPHONE (OUTPATIENT)
Dept: FAMILY MEDICINE | Facility: CLINIC | Age: 82
End: 2017-10-03

## 2017-10-03 NOTE — PHYSICIAN QUERY
PT Name: Jose Maria Jr.  MR #: [id number]     Physician Query Form - Documentation Clarification      CDS/: Swati Horn RN, CDI                Contact information: [phone]    This form is a permanent document in the medical record.     Query Date: October 3, 2017    By submitting this query, we are merely seeking further clarification of documentation. Please utilize your independent clinical judgment when addressing the question(s) below.    The Medical record reflects the following:    Supporting Clinical Findings Location in Medical Record          Prediabetes       H&P, Progress Notes & Discharge Summary     · Mr. Maria is a 86 y/o gentleman with PMHx  MI x 2 s/p CABG, DM, CKD4, HTN, HLD, Hypothyroidism, and gout who presents  to [facility] as a transfer from [facility] for evaluation of NSTEMI.        Hx of HTN, HLD, DM2, CAD s/p          CABG x 3 (SVG-LAD, SVG-PDA,        SVG-OM1) and subsequent PCI,        CKD V s/p RUE AVF who was            admitted to the Internal Medicine      service      H&P , Progress Notes and Discharge Summary                Consult 09/29                                                                            Doctor, Please specify diagnosis or diagnoses associated with above clinical findings.    Please clarify the above conflicting diagnosis.    Provider Use Only      [ x ]  Prediabetes    [  ]  DM2    [  ]  Other__________________________                                                                                                             [  ] Clinically undetermined

## 2017-10-03 NOTE — TELEPHONE ENCOUNTER
Spoke to someone in case mgmt she believes pt may need home health to help with medications. When she spoke to pt on the phone he was confused about what he was taking. Please advise.

## 2017-10-03 NOTE — PROGRESS NOTES
Left a voice message for Ana Hanna RN Care management per home health. Informed that [facility] has not seen or contacted JoseJuan C

## 2017-10-04 PROBLEM — N18.4 CKD (CHRONIC KIDNEY DISEASE) STAGE 4, GFR 15-29 ML/MIN: Status: RESOLVED | Noted: 2017-09-29 | Resolved: 2017-10-04

## 2017-10-05 ENCOUNTER — TELEPHONE (OUTPATIENT)
Dept: CARDIOLOGY | Facility: CLINIC | Age: 82
End: 2017-10-05

## 2017-10-05 PROBLEM — I95.1 ORTHOSTATIC HYPOTENSION: Status: RESOLVED | Noted: 2017-03-18 | Resolved: 2017-10-05

## 2017-10-05 PROBLEM — N19 UREMIA: Status: ACTIVE | Noted: 2017-10-05

## 2017-10-05 PROBLEM — R07.9 CHEST PAIN: Status: RESOLVED | Noted: 2017-09-08 | Resolved: 2017-10-05

## 2017-10-06 PROBLEM — N18.6 ESRD (END STAGE RENAL DISEASE): Status: ACTIVE | Noted: 2017-10-05

## 2017-10-06 PROBLEM — N18.6 ESRD (END STAGE RENAL DISEASE): Status: ACTIVE | Noted: 2017-10-06

## 2017-10-08 PROBLEM — E88.09 HYPOALBUMINEMIA DUE TO PROTEIN-CALORIE MALNUTRITION: Status: ACTIVE | Noted: 2017-10-08

## 2017-10-08 PROBLEM — E46 HYPOALBUMINEMIA DUE TO PROTEIN-CALORIE MALNUTRITION: Status: ACTIVE | Noted: 2017-10-08

## 2017-10-08 PROBLEM — M79.89 SWELLING OF RIGHT UPPER EXTREMITY: Status: ACTIVE | Noted: 2017-10-08

## 2017-10-10 PROBLEM — N18.6 ESRD (END STAGE RENAL DISEASE): Status: RESOLVED | Noted: 2017-10-06 | Resolved: 2017-10-10

## 2017-10-10 PROBLEM — R29.90 EPISODE OF TRANSIENT NEUROLOGIC SYMPTOMS: Status: ACTIVE | Noted: 2017-10-10

## 2017-10-10 PROBLEM — G45.9 TIA (TRANSIENT ISCHEMIC ATTACK): Status: ACTIVE | Noted: 2017-10-10

## 2017-10-11 PROBLEM — N19 UREMIA: Status: RESOLVED | Noted: 2017-10-05 | Resolved: 2017-10-11

## 2017-10-16 NOTE — PLAN OF CARE
Pt d/c to home.     10/16/17 1012   Final Note   Assessment Type Final Discharge Note   Discharge Disposition Home   What phone number can be called within the next 1-3 days to see how you are doing after discharge? [phone]   Hospital Follow Up  Appt(s) scheduled? No  (pt will be contacted for appts)   Right Care Referral Info   Post Acute Recommendation No Care

## 2017-11-01 ENCOUNTER — OFFICE VISIT (OUTPATIENT)
Dept: FAMILY MEDICINE | Facility: CLINIC | Age: 82
End: 2017-11-01
Payer: MEDICARE

## 2017-11-01 VITALS
SYSTOLIC BLOOD PRESSURE: 130 MMHG | BODY MASS INDEX: 22.35 KG/M2 | OXYGEN SATURATION: 98 % | DIASTOLIC BLOOD PRESSURE: 60 MMHG | WEIGHT: 155.81 LBS | HEART RATE: 89 BPM

## 2017-11-01 DIAGNOSIS — I25.709 CORONARY ARTERY DISEASE INVOLVING CORONARY BYPASS GRAFT OF NATIVE HEART WITH ANGINA PECTORIS: ICD-10-CM

## 2017-11-01 DIAGNOSIS — I10 ESSENTIAL HYPERTENSION: ICD-10-CM

## 2017-11-01 DIAGNOSIS — D63.1 ANEMIA OF CHRONIC RENAL FAILURE, STAGE 5: ICD-10-CM

## 2017-11-01 DIAGNOSIS — Z99.2 ESRD (END STAGE RENAL DISEASE) ON DIALYSIS: ICD-10-CM

## 2017-11-01 DIAGNOSIS — E03.9 ACQUIRED HYPOTHYROIDISM: ICD-10-CM

## 2017-11-01 DIAGNOSIS — N18.6 ESRD (END STAGE RENAL DISEASE) ON DIALYSIS: ICD-10-CM

## 2017-11-01 DIAGNOSIS — Z09 HOSPITAL DISCHARGE FOLLOW-UP: Primary | ICD-10-CM

## 2017-11-01 DIAGNOSIS — N18.5 ANEMIA OF CHRONIC RENAL FAILURE, STAGE 5: ICD-10-CM

## 2017-11-01 PROCEDURE — 99214 OFFICE O/P EST MOD 30 MIN: CPT | Mod: S$PBB,,, | Performed by: FAMILY MEDICINE

## 2017-11-01 PROCEDURE — 99495 TRANSJ CARE MGMT MOD F2F 14D: CPT | Mod: PBBFAC,PO | Performed by: FAMILY MEDICINE

## 2017-11-01 PROCEDURE — 99213 OFFICE O/P EST LOW 20 MIN: CPT | Mod: PBBFAC,PO | Performed by: FAMILY MEDICINE

## 2017-11-01 PROCEDURE — 99999 PR PBB SHADOW E&M-EST. PATIENT-LVL III: CPT | Mod: PBBFAC,,, | Performed by: FAMILY MEDICINE

## 2017-11-01 RX ORDER — METOPROLOL SUCCINATE 50 MG/1
TABLET, EXTENDED RELEASE ORAL
COMMUNITY
Start: 2017-10-04 | End: 2017-11-01

## 2017-11-01 RX ORDER — TEMAZEPAM 15 MG/1
15 CAPSULE ORAL NIGHTLY
COMMUNITY
End: 2018-02-16 | Stop reason: DRUGHIGH

## 2017-11-01 NOTE — PROGRESS NOTES
Transitional Care Note  Subjective:       Patient ID: Jose Maria Jr. is a 85 y.o. male.  Chief Complaint: Hospital Follow Up    Family and/or Caretaker present at visit?  Yes.  Diagnostic tests reviewed/disposition: No diagnosic tests pending after this hospitalization.  Disease/illness education:   Home health/community services discussion/referrals: Patient has home health established at patient is unsure of company..   Establishment or re-establishment of referral orders for community resources: No other necessary community resources.   Discussion with other health care providers: No discussion with other health care providers necessary.   HPI 85 year old male here with his wife for hospital follow up. He was admitted from 10/5 to 10/11. He had symptoms of uremia and HD was initiated. Patient is currently doing HD on T/Th/Sat.   He states he is tolerating HD well. He does not take BP medication on days of HD.   Patient denies falls. He ambulates with a walker.   Patient had four cardiac stents placed and is followed by . He takes statin and brillinta, and aspirin. Patient is taking temazepam 30 mg nightly which is prescribed by .   He has home health ordered by . Patient is unsure of the company.   Review of Systems   Constitutional: Negative for chills and fever.   Respiratory: Negative for chest tightness and shortness of breath.    Cardiovascular: Negative for chest pain and leg swelling.   Gastrointestinal: Negative for abdominal pain, blood in stool, diarrhea, nausea and vomiting.   Genitourinary: Negative for dysuria and hematuria.   Psychiatric/Behavioral: Negative for agitation and behavioral problems.       Objective:      Physical Exam   Constitutional: He appears well-developed and well-nourished. No distress.   HENT:   Mouth/Throat: Oropharynx is clear and moist. No oropharyngeal exudate.   Eyes: EOM are normal. Right eye exhibits no discharge. Left eye exhibits no  discharge.   Neck: Normal range of motion.   Cardiovascular: Normal rate and regular rhythm.    Pulmonary/Chest: Effort normal. He has no wheezes.   Abdominal: Soft. There is no tenderness. There is no guarding.   Lymphadenopathy:     He has no cervical adenopathy.   Skin: He is not diaphoretic.   Psychiatric: He has a normal mood and affect. His behavior is normal. Judgment and thought content normal.   Vitals reviewed.    AV vistula in RUE.  Tunneled CVC in right chest wall: intact.   Assessment:       1. Hospital discharge follow-up    2. Acquired hypothyroidism    3. Anemia of chronic renal failure, stage 5    4. 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 Essential hypertension    6. Coronary artery disease involving coronary bypass graft of native heart with angina pectoris        Plan:           1. Hospital discharge f/u for uremia, HD initiation: patient tolerating HD well. He was advised on ambulating at all times with walker or cane and to be cautious as he is a fall risk. He is due to f/u with  in one week to reassess RUE AV fistula.   2. Htn: well controlled  3. CAD with four stents: on statin, asa, brillinta. F/u with  as scheduled  4. Hypothyroidism: TSH slightly  high but t4 normal.   5. Immunizations: flu shot utd  rtc 6 months or sooner prn.

## 2017-11-03 LAB — CORONARY STENOSIS: ABNORMAL

## 2017-11-10 ENCOUNTER — TELEPHONE (OUTPATIENT)
Dept: CARDIOLOGY | Facility: CLINIC | Age: 82
End: 2017-11-10

## 2017-11-10 NOTE — TELEPHONE ENCOUNTER
Called patient to reschedule his appointment that he canceled on 10/24/17. No answer. Left message for patient to call me back. Will continue to attempt to call patient.

## 2017-12-15 ENCOUNTER — TELEPHONE (OUTPATIENT)
Dept: FAMILY MEDICINE | Facility: CLINIC | Age: 82
End: 2017-12-15

## 2017-12-15 NOTE — TELEPHONE ENCOUNTER
----- Message from Lamar Felix MD sent at 12/15/2017 10:21 AM CST -----  This patient needs eye exam, for me to fill this driving paperwork out. Has he been to the eye doctor recently? If not, he can come in for a nurses visit to test that.  Thanks

## 2017-12-20 ENCOUNTER — CLINICAL SUPPORT (OUTPATIENT)
Dept: FAMILY MEDICINE | Facility: CLINIC | Age: 82
End: 2017-12-20
Payer: MEDICARE

## 2017-12-20 PROCEDURE — 99212 OFFICE O/P EST SF 10 MIN: CPT | Mod: PBBFAC,PO

## 2017-12-20 PROCEDURE — 99999 PR PBB SHADOW E&M-EST. PATIENT-LVL II: CPT | Mod: PBBFAC,,,

## 2018-01-03 ENCOUNTER — HOSPITAL ENCOUNTER (OUTPATIENT)
Facility: HOSPITAL | Age: 83
Discharge: HOME | End: 2018-01-03
Attending: INTERNAL MEDICINE | Admitting: INTERNAL MEDICINE
Payer: MEDICARE

## 2018-01-03 VITALS
OXYGEN SATURATION: 97 % | SYSTOLIC BLOOD PRESSURE: 123 MMHG | RESPIRATION RATE: 18 BRPM | HEART RATE: 74 BPM | BODY MASS INDEX: 22.19 KG/M2 | WEIGHT: 155 LBS | DIASTOLIC BLOOD PRESSURE: 57 MMHG | HEIGHT: 70 IN | TEMPERATURE: 98 F

## 2018-01-03 DIAGNOSIS — T82.898A INADEQUATE FLOW OF DIALYSIS ARTERIOVENOUS FISTULA, INITIAL ENCOUNTER: Primary | ICD-10-CM

## 2018-01-03 DIAGNOSIS — N18.6 ESRD (END STAGE RENAL DISEASE) ON DIALYSIS: ICD-10-CM

## 2018-01-03 DIAGNOSIS — Z99.2 ESRD (END STAGE RENAL DISEASE) ON DIALYSIS: ICD-10-CM

## 2018-01-03 PROBLEM — T82.858A STENOSIS OF ARTERIOVENOUS DIALYSIS FISTULA: Status: ACTIVE | Noted: 2018-01-03

## 2018-01-03 PROCEDURE — 63600175 PHARM REV CODE 636 W HCPCS

## 2018-01-03 PROCEDURE — C1769 GUIDE WIRE: HCPCS

## 2018-01-03 PROCEDURE — 36902 INTRO CATH DIALYSIS CIRCUIT: CPT | Mod: ,,, | Performed by: INTERNAL MEDICINE

## 2018-01-03 PROCEDURE — C1894 INTRO/SHEATH, NON-LASER: HCPCS

## 2018-01-03 PROCEDURE — 25000003 PHARM REV CODE 250

## 2018-01-03 NOTE — PLAN OF CARE
Problem: Patient Care Overview  Goal: Plan of Care Review  Outcome: Ongoing (interventions implemented as appropriate)  Admission completed, iv started, oriented to room

## 2018-01-03 NOTE — H&P
[facility]  History & Physical    Subjective:      Chief Complaint/Reason for Admission: Malfunction of the  Right BC AVF    Jose Maria Jr. is a 85 y.o. male withRight BC AVF presents here with difficultyin cannulation  Of the AV access.    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 No pertinent family history.  Social History   Substance Use Topics    Smoking status: Never Smoker    Smokeless tobacco: Never Used    Alcohol use No       Facility-Administered Medications Prior to Admission   Medication    epoetin edwin injection 20,000 Units     PTA Medications   Medication Sig    allopurinol (ZYLOPRIM) 100 MG tablet Take 1 tablet (100 mg total) by mouth once daily.    aspirin 81 MG Chew Take 81 mg by mouth once daily.    calcitRIOL (ROCALTROL) 0.25 MCG Cap Take 1 capsule (0.25 mcg total) by mouth every Mon, Wed, Fri.    finasteride (PROSCAR) 5 mg tablet Take 5 mg by mouth once daily.    isosorbide mononitrate (IMDUR) 120 MG 24 hr tablet Take 1 tablet (120 mg total) by mouth once daily.    levothyroxine (SYNTHROID) 25 MCG tablet Take 1 tablet (25 mcg total) by mouth once daily.    lidocaine-prilocaine (EMLA) cream Apply topically every Tues, Thurs, Sat.    metoprolol succinate (TOPROL-XL) 50 MG 24 hr tablet Take 1 tablet (50 mg total) by mouth once daily.    multivitamin capsule Take 1 capsule by mouth once daily.    omega-3 acid ethyl esters (LOVAZA) 1 gram capsule Take 1 capsule (1 g total) by mouth 2 (two) times daily.    rosuvastatin (CRESTOR) 10 MG  tablet Take 1 tablet (10 mg total) by mouth once daily. (Patient taking differently: Take 40 mg by mouth once daily. )    tamsulosin (FLOMAX) 0.4 mg Cp24 Take 1 capsule (0.4 mg total) by mouth once daily.    temazepam (RESTORIL) 15 mg Cap Take 15 mg by mouth every evening.    ticagrelor (BRILINTA) 90 mg tablet Take 1 tablet (90 mg total) by mouth 2 (two) times daily.    ACCU-CHEK TANIKA PLUS TEST STRP Strp once daily.     furosemide (LASIX) 80 MG tablet Take 1 tablet (80 mg total) by mouth 2 (two) times daily.    nitroGLYCERIN (NITROSTAT) 0.4 MG SL tablet Place 1 tablet (0.4 mg total) under the tongue every 5 (five) minutes as needed for Chest pain.     Review of patient's allergies indicates:   Allergen Reactions    Benadryl [diphenhydramine hcl] Rash    Versed [midazolam] Rash        ROS  Constitutional: No fever or chills, no weight changes.  Eyes: No visual changes or photophobia  HEENT: No nasal congestion or sore throat  Respiratory: No cough or shortness of breath  Cardiovascular: No chest pain or palpitations  Gastrointestinal: Good appetite, no nausea or vomiting, no change in bowel habits  Genitourinary: No hematuria or dysuria  Skin: No rash or pruritis  Hematologic/lymphatic: No easy bruising, bleeding or lymphadenopathy  Musculoskeletal: No arthralgias or myalgias  Neurological: No seizures or tremors  Endocrine: No heat/cold intolerance.  No polyuria/polydipsia.  Psychiatric:  No depression or anxiety.  Objective:      Vital Signs (Most Recent)  Temp: 97.9 °F (36.6 °C) (01/03/18 0919)  Pulse: 101 (01/03/18 0919)  Resp: 18 (01/03/18 0919)  BP: (!) 140/72 (01/03/18 0919)  SpO2: 98 % (01/03/18 0919)    Vital Signs Range (Last 24H):  Temp:  [97.9 °F (36.6 °C)]   Pulse:  [101]   Resp:  [18]   BP: (140)/(72)   SpO2:  [98 %]     Physical Exam  General appearance: Well developed, well nourished  Head: Normocephalic, atraumatic  Eyes:  Conjunctivae nl. Sclera anicteric. PERRL.  HEENT: Lips, mucosa, and  tongue normal; teeth and gums normal and oropharynx clear.  Neck: Supple, trachea midline, thyroid not enlarged,   Lungs: Clear to auscultation bilaterally and normal respiratory effort  Heart: Regular rate and rhythm, S1, S2 normal, no murmur, click, rub or gallop  Abdomen: Soft, non-tender non-distended; bowel sounds normal; no masses,  no organomegaly  Extremities: No cyanosis or clubbing. No edema  Pulses: 2+ and symmetric  Skin: Skin color, texture, turgor normal. No rashes or lesions  Lymph nodes: Cervical, supraclavicular, and axillary nodes normal.  Neurologic: Normal strength and tone. No focal numbness or weakness  Psychiatric:  Alert and oriented times 3.  Affect appropriate.  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       Plan:    1. Fistulagram with possible PTA

## 2018-01-03 NOTE — NURSING
Patient without bleed or hematoma to fistula site.  Dressing clean, dry and intact.  Denies pain, numbness or tingling to rue.  Discharge instructions and medlist given.  Patient and wife verbalized understanding.  Off unit via wheelchair with DEWAYNE Felton to front door for transport home by [employer] set up by [facility] nurse.

## 2018-01-03 NOTE — NURSING TRANSFER
Nursing Transfer Note      1/3/2018     Transfer From: cath lab    Transfer via stretcher    Transfer with cardiac monitoring    Transported by RN    Medicines sent: none    Chart send with patient: Yes    Notified: spouse    Patient reassessed at: 1138  1/3/2017    Upon arrival to floor:, patient oriented to room, call bell in reach and bed in lowest position

## 2018-01-03 NOTE — BRIEF OP NOTE
[facility]  Brief Operative Note     SUMMARY     Surgery Date: 1/3/2018     Surgeon(s) and Role:     * Goyo Mac MD - Primary    Assisting Surgeon: None    Pre-op Diagnosis:  Malfunction of arteriovenous dialysis fistula, subsequent encounter [T82.211D]    Post-op Diagnosis: PTA of the juxta anastomosis with 6 mmx  4 cm lutonix drug coated balloon and 5 mm 4 cm ultra verse balloon.  Procedure(s) (LRB):  FISTULOGRAM (Left)    Anesthesia: RN IV Sedation  Estimated Blood Loss: 10 ml.         Specimens:   Specimen (12h ago through future)    None

## 2018-01-03 NOTE — DISCHARGE SUMMARY
[facility]  Discharge Note  Short Stay    Admit Date: 1/3/2018    Discharge Date and Time: No discharge date for patient encounter.     Attending Physician: Goyo Mac MD     Discharge Provider: Goyo Mac    Diagnoses: PTA of the juxta anastomosis with 6 mmx  4 cm lutonix drug coated balloon and 5 mm 4 cm ultra verse balloon.  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       Discharged Condition: good    Hospital Course: Patient was admitted for an outpatient procedure and tolerated the procedure well with no complications.    Final Diagnoses:  PTA of the juxta anastomosis with 6 mmx  4 cm lutonix drug coated balloon and 5 mm 4 cm ultra verse balloon.    Disposition: Home or Self Care    Follow up/Patient Instructions:    Medications:  Reconciled Home Medications:   Current Discharge Medication List      CONTINUE these medications which have NOT CHANGED    Details   allopurinol (ZYLOPRIM) 100 MG tablet Take 1 tablet (100 mg total) by mouth once daily.  Qty: 30 tablet, Refills: 0      aspirin 81 MG Chew Take 81 mg by mouth once daily.      calcitRIOL (ROCALTROL) 0.25 MCG Cap Take 1 capsule (0.25 mcg total) by mouth every Mon, Wed, Fri.  Qty: 12 capsule, Refills: 11      finasteride (PROSCAR) 5 mg tablet Take 5 mg by mouth once daily.      isosorbide mononitrate (IMDUR) 120 MG 24 hr tablet Take 1 tablet (120 mg total) by mouth once daily.  Qty: 30 tablet, Refills: 11      levothyroxine (SYNTHROID) 25 MCG tablet Take 1 tablet (25 mcg total) by mouth once daily.  Qty: 90 tablet, Refills: 3      lidocaine-prilocaine (EMLA) cream Apply topically every Tues, Thurs, Sat.  Qty: 40 g, Refills: 12      metoprolol succinate (TOPROL-XL) 50 MG 24 hr tablet Take 1 tablet (50 mg total) by mouth once daily.  Qty: 90 tablet, Refills: 3       multivitamin capsule Take 1 capsule by mouth once daily.      omega-3 acid ethyl esters (LOVAZA) 1 gram capsule Take 1 capsule (1 g total) by mouth 2 (two) times daily.  Qty: 60 capsule, Refills: 3      rosuvastatin (CRESTOR) 10 MG tablet Take 1 tablet (10 mg total) by mouth once daily.  Qty: 30 tablet, Refills: 11      tamsulosin (FLOMAX) 0.4 mg Cp24 Take 1 capsule (0.4 mg total) by mouth once daily.  Qty: 30 capsule, Refills: 11      temazepam (RESTORIL) 15 mg Cap Take 15 mg by mouth every evening.      ticagrelor (BRILINTA) 90 mg tablet Take 1 tablet (90 mg total) by mouth 2 (two) times daily.  Qty: 60 tablet, Refills: 11      ACCU-CHEK TANIKA PLUS TEST STRP Strp once daily.       furosemide (LASIX) 80 MG tablet Take 1 tablet (80 mg total) by mouth 2 (two) times daily.  Qty: 180 tablet, Refills: 3      nitroGLYCERIN (NITROSTAT) 0.4 MG SL tablet Place 1 tablet (0.4 mg total) under the tongue every 5 (five) minutes as needed for Chest pain.  Qty: 25 tablet, Refills: 1           No discharge procedures on file.      Discharge Procedure Orders ; Resume previous diet and activity , follow up as needed.

## 2018-01-04 NOTE — OP NOTE
DATE OF PROCEDURE:  01/03/2018    PROCEDURE TYPE:  Fistulogram of right brachiocephalic fistula.    INDICATION:  Difficulty cannulation.    :  Goyo Mac M.D.    REFERRING PHYSICIAN:  Jayleen Fung M.D.    POSTPROCEDURE DIAGNOSES:  Superior venacavogram, subclavian venogram, axillary   venogram, cephalic venogram.  Reflux arteriogram revealed there was high-grade   juxta-anastomotic stenosis, which was balloon angioplastied with 5 mm x 4 cm   Ultraverse balloon followed by 6 mm x 4 cm Lutonix drug-coated balloon.    PROCEDURE NOTE IN DETAIL:  After informed consent was obtained from Mr. Maria,   he was brought into Access, placed in supine position.  The area of his right   radiocephalic fistula was cleaned and draped in the usual sterile fashion.    After achieving local anesthesia with lidocaine and epinephrine, access was   cannulated with micropuncture needle.  The mandril wire was advanced.  A   5-Cypriot sheath was established.  Multiple angiographic runs revealed there was   patent superior vena cava, subclavian vein, axillary vein, cephalic vein and   reflux arteriogram revealed there are juxta-anastomotic stenosis, high-grade.    Subsequently, a second cannulation was made facing the radial towards the   arterial inflow and then a micropuncture wire was advanced under fluoroscopy   guidance.  A 5-Cypriot sheath was established and an angled Glidewire was   advanced under fluoroscopy guidance into the radial artery.  Followed by that, a   6-Cypriot sheath and then a 5 mm x 4 cm Ultraverse balloon was used to   angioplasty the entire juxta-anastomotic area.  Post-angioplasty angiogram   revealed excellent results.  Given the nature of the lesion and his   comorbidities and frailties in elderly gentleman, I decided to use a drug-coated   balloon to keep the access primary patency rate.  I decided to use a 6 mm x 4   cm Lutonix drug-coated balloon.  Post-angioplasty angiogram revealed an    excellent result.  The 2 minutes of inflation time was used for the drug-coated   balloon.  Post-angioplasty balloon was removed and repeat angiogram revealed   there was an excellent flow.  There was residual stenosis less than 10%.  The   patient tolerated the procedure well.  No immediate complication.  Estimated   blood loss was 5 mL.  The patient was discharged from the Access Suite in a   stable condition.  After removing the sheath, 3-0 Prolene suture was placed to   secure the hemostasis.  No immediate complications.  Estimated blood loss was 10   mL.  The patient was discharged from Access Suite in a stable condition.    PLAN:  Per KDOQI recommendations, we will do surveillance and monitoring.  If   there is an abnormality, we will follow him closely.  Otherwise, we will follow   him on an as needed basis.      DRK/HN  dd: 01/03/2018 14:08:59 (CST)  td: 01/03/2018 19:47:23 (CST)  Doc ID   #[id number]  Job ID #[id number]    CC: Jayleen Fung M.D.

## 2018-02-05 PROBLEM — Z09 HOSPITAL DISCHARGE FOLLOW-UP: Status: RESOLVED | Noted: 2017-01-10 | Resolved: 2018-02-05

## 2018-02-06 ENCOUNTER — TELEPHONE (OUTPATIENT)
Dept: SURGERY | Facility: CLINIC | Age: 83
End: 2018-02-06

## 2018-02-06 NOTE — TELEPHONE ENCOUNTER
----- Message from Avelina Grace DO sent at 2/6/2018  1:09 PM CST -----  Pls coordinate appt at [facility]. Pt is ready to have permacath out but will need office appt 1st, then OR date bc cath in since 10/2017.   thx  2-6-18 1322  Called, no answer, left message to call clinic.

## 2018-02-16 ENCOUNTER — INITIAL CONSULT (OUTPATIENT)
Dept: SURGERY | Facility: CLINIC | Age: 83
End: 2018-02-16
Payer: MEDICARE

## 2018-02-16 VITALS
HEART RATE: 95 BPM | WEIGHT: 155.88 LBS | DIASTOLIC BLOOD PRESSURE: 60 MMHG | TEMPERATURE: 98 F | HEIGHT: 70 IN | OXYGEN SATURATION: 96 % | BODY MASS INDEX: 22.32 KG/M2 | SYSTOLIC BLOOD PRESSURE: 112 MMHG

## 2018-02-16 DIAGNOSIS — Z79.01 ANTICOAGULATED: ICD-10-CM

## 2018-02-16 DIAGNOSIS — Z99.2 ESRD (END STAGE RENAL DISEASE) ON DIALYSIS: ICD-10-CM

## 2018-02-16 DIAGNOSIS — Z45.2 ENCOUNTER FOR CENTRAL LINE CARE: Primary | ICD-10-CM

## 2018-02-16 DIAGNOSIS — I25.10 CORONARY ARTERY DISEASE INVOLVING NATIVE CORONARY ARTERY OF NATIVE HEART WITHOUT ANGINA PECTORIS: ICD-10-CM

## 2018-02-16 DIAGNOSIS — N18.6 ESRD (END STAGE RENAL DISEASE) ON DIALYSIS: ICD-10-CM

## 2018-02-16 PROCEDURE — 99215 OFFICE O/P EST HI 40 MIN: CPT | Mod: S$PBB,,, | Performed by: SURGERY

## 2018-02-16 PROCEDURE — 1126F AMNT PAIN NOTED NONE PRSNT: CPT | Mod: ,,, | Performed by: SURGERY

## 2018-02-16 PROCEDURE — 99999 PR PBB SHADOW E&M-EST. PATIENT-LVL IV: CPT | Mod: PBBFAC,,, | Performed by: SURGERY

## 2018-02-16 PROCEDURE — 1159F MED LIST DOCD IN RCRD: CPT | Mod: ,,, | Performed by: SURGERY

## 2018-02-16 PROCEDURE — 99214 OFFICE O/P EST MOD 30 MIN: CPT | Mod: PBBFAC,PN | Performed by: SURGERY

## 2018-02-16 RX ORDER — TEMAZEPAM 30 MG/1
30 CAPSULE ORAL NIGHTLY PRN
COMMUNITY
Start: 2018-02-06

## 2018-02-16 RX ORDER — OMEPRAZOLE 20 MG/1
20 CAPSULE, DELAYED RELEASE ORAL DAILY
COMMUNITY
Start: 2018-01-24 | End: 2018-10-18 | Stop reason: SDUPTHER

## 2018-02-16 RX ORDER — ROSUVASTATIN CALCIUM 40 MG/1
TABLET, COATED ORAL
Refills: 3 | COMMUNITY
Start: 2017-11-18 | End: 2018-02-16 | Stop reason: DRUGHIGH

## 2018-02-16 NOTE — PROGRESS NOTES
Subjective:      Patient ID: Jose Maria Jr. is a 85 y.o. male.    Chief Complaint: Consult  Pt known to me, status post permacath insertion 10/2017.  He has a working right upper extremity AV fistula and presents today to plan permacatheter removal.  No new events.  States he feels well.  He is still taking Brilenta.  He has no plantar with his cardiologist, Dr. Gorman, 2/19/18.  No problems from his permacatheter.  He did have a problem with cannulation of this fistula in early January and that has resolved.  No new complaints or questions.    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 No pertinent family history.  Social History     Social History    Marital status:      Spouse name: N/A    Number of children: N/A    Years of education: N/A     Social History Main Topics    Smoking status: Never Smoker    Smokeless tobacco: Never Used    Alcohol use No    Drug use: No    Sexual activity: Not Currently     Other Topics Concern    None     Social History Narrative    None       Current Outpatient Prescriptions   Medication Sig Dispense Refill    ACCU-CHEK TANIKA PLUS TEST STRP Strp once daily.       allopurinol (ZYLOPRIM) 100 MG tablet Take 1 tablet (100 mg total) by mouth once daily. 30 tablet 0    aspirin 81 MG Chew Take 81 mg by mouth once daily.      calcitRIOL (ROCALTROL) 0.25 MCG Cap Take 1 capsule (0.25 mcg total) by mouth every Mon, Wed, Fri. 12 capsule 11    furosemide (LASIX) 80 MG tablet Take 1 tablet (80 mg total) by mouth 2 (two) times daily. 180 tablet  3    isosorbide mononitrate (IMDUR) 120 MG 24 hr tablet Take 1 tablet (120 mg total) by mouth once daily. 30 tablet 11    levothyroxine (SYNTHROID) 25 MCG tablet Take 1 tablet (25 mcg total) by mouth once daily. 90 tablet 3    lidocaine-prilocaine (EMLA) cream Apply topically every Tues, Thurs, Sat. 40 g 12    multivitamin capsule Take 1 capsule by mouth once daily.      nitroGLYCERIN (NITROSTAT) 0.4 MG SL tablet Place 1 tablet (0.4 mg total) under the tongue every 5 (five) minutes as needed for Chest pain. 25 tablet 1    omega-3 acid ethyl esters (LOVAZA) 1 gram capsule Take 1 capsule (1 g total) by mouth 2 (two) times daily. 60 capsule 3    omeprazole (PRILOSEC) 20 MG capsule       rosuvastatin (CRESTOR) 10 MG tablet Take 1 tablet (10 mg total) by mouth once daily. (Patient taking differently: Take 40 mg by mouth once daily. ) 30 tablet 11    temazepam (RESTORIL) 30 mg capsule       ticagrelor (BRILINTA) 90 mg tablet Take 1 tablet (90 mg total) by mouth 2 (two) times daily. 60 tablet 11    finasteride (PROSCAR) 5 mg tablet Take 5 mg by mouth once daily.      metoprolol succinate (TOPROL-XL) 50 MG 24 hr tablet Take 1 tablet (50 mg total) by mouth once daily. 90 tablet 3    tamsulosin (FLOMAX) 0.4 mg Cp24 Take 1 capsule (0.4 mg total) by mouth once daily. 30 capsule 11     Current Facility-Administered Medications   Medication Dose Route Frequency Provider Last Rate Last Dose    epoetin edwin injection 20,000 Units  20,000 Units Subcutaneous Q14 Days Jayleen Fung MD   20,000 Units at 04/27/17 1146     Review of patient's allergies indicates:   Allergen Reactions    Benadryl [diphenhydramine hcl] Rash    Versed [midazolam] Rash       ROS:  All systems were reviewed and are negative, except that mentioned in the HPI.    Objective:     Vitals:    02/16/18 0844   BP: 112/60   BP Location: Left arm   Patient Position: Sitting   BP Method: Large (Manual)   Pulse: 95   Temp: 97.5 °F (36.4 °C)   SpO2:  "96%   Weight: 70.7 kg (155 lb 14.4 oz)   Height: 5' 10" (1.778 m)     Physical Exam   Constitutional: He is oriented to person, place, and time. He appears well-developed and well-nourished. No distress.   HENT:   Head: Normocephalic and atraumatic.   Eyes: Conjunctivae and EOM are normal. Pupils are equal, round, and reactive to light. No scleral icterus.   Neck: Normal range of motion. Neck supple. No JVD present.   Cardiovascular: Normal rate and regular rhythm.    Pulmonary/Chest: Effort normal and breath sounds normal. No respiratory distress.   Right IJ permacatheter in place, no erythema, nontender.   Abdominal: Soft. He exhibits no distension.   Musculoskeletal: Normal range of motion. He exhibits no edema or tenderness.   Neurological: He is alert and oriented to person, place, and time. No cranial nerve deficit.   Skin: Skin is warm and dry. He is not diaphoretic.   Psychiatric: He has a normal mood and affect.       Lab Review: CBC:   Lab Results   Component Value Date    WBC 5.90 10/11/2017    RBC 2.61 (L) 10/11/2017    HGB 7.9 (L) 10/11/2017    HCT 26.4 (L) 10/11/2017    HCT 30 (L) 09/25/2017     (L) 10/11/2017     BMP:   Lab Results   Component Value Date    GLU 83 10/11/2017     10/11/2017     08/07/2017    K 3.8 10/11/2017    K 4.1 08/07/2017     10/11/2017     08/07/2017    CO2 26 10/11/2017    CO2 24 08/07/2017    BUN 12 10/11/2017    BUN 58 (H) 08/07/2017    CREATININE 2.32 (H) 10/11/2017    CREATININE 3.4 (H) 08/07/2017    CALCIUM 7.6 (L) 10/11/2017    CALCIUM 10.9 (H) 08/07/2017     Lab Results   Component Value Date    ALT 26 10/10/2017    AST 14 (L) 10/10/2017    ALKPHOS 46 10/10/2017    BILITOT 0.5 10/10/2017       Diagnostics Review:  Echo 10/2017:  CONCLUSIONS     1 - Low normal to mildly depressed left ventricular systolic function (EF 50-55%).     2 - Low normal right ventricular systolic function .     3 - Mild to moderate aortic stenosis, TERRIE = 1.49 cm2, " mean gradient = 7 mmHg.     4 - Mild to moderate aortic regurgitation.     5 - Severe mitral regurgitation.     6 - Moderate to severe tricuspid regurgitation.     7 - Intermediate central venous pressure.     8 - The estimated RV systolic pressure is 49 mmHg    Assessment:     1. Encounter for central line care    2. ESRD (end stage renal disease) on dialysis    3. Coronary artery disease involving native coronary artery of native heart without angina pectoris    4. Anticoagulated      Plan:   The pathology of the patient's disease was discussed: In need of permacatheter removal.  Elective removal in the operating room was recommended, after the patient is been evaluated by his cardiologist.  We will request evaluation for holding Brilinta preop. The surgical procedure, risks, postop recovery and consent were discussed. All questions were answered and the consent was signed.  Surgery is scheduled for March 7, 2018.

## 2018-02-16 NOTE — LETTER
February 16, 2018      Jayleen Fung MD  [address]  [address]  [facility]  [facility] [address]           [facility]  [address]  [facility] [address]  Phone: [phone]  Fax: [phone]          Patient: Jose Maria Jr.   MR Number: [id number]   YOB: 1932   Date of Visit: 2/16/2018       Dear Dr. Jayleen Fung:    Thank you for referring Jose Maria to me for evaluation. Attached you will find relevant portions of my assessment and plan of care.    If you have questions, please do not hesitate to call me. I look forward to following Jose Maria along with you.    Sincerely,    Avelina Grace,     Enclosure  CC:  No Recipients    If you would like to receive this communication electronically, please contact [email or web address] or [phone] to request more information on [employer] access.    For providers and/or their staff who would like to refer a patient to [facility], please contact us through our one-stop-shop provider referral line, [facility], at [phone].    If you feel you have received this communication in error or would no longer like to receive these types of communications, please e-mail [email or web address]

## 2018-02-21 ENCOUNTER — DOCUMENTATION ONLY (OUTPATIENT)
Dept: SURGERY | Facility: CLINIC | Age: 83
End: 2018-02-21

## 2018-02-21 NOTE — PROGRESS NOTES
Pre-op cardiology evaluation received from Dr. Gorman.  Notes and studies scanned into media file.    Recommendations include:  Cont ASA  Hold Brilinta 5d pre-op   Restart Brilinta ASAP post-op

## 2018-02-22 ENCOUNTER — TELEPHONE (OUTPATIENT)
Dept: SURGERY | Facility: CLINIC | Age: 83
End: 2018-02-22

## 2018-02-22 NOTE — TELEPHONE ENCOUNTER
----- Message from Avelina Grace, DO sent at 2/21/2018  5:14 PM CST -----  Please let pt know that he should:  Cont ASA preop  Hold Brilinta 5d pre-op   Thx  2-22-18 1315  Per Dr. Grace, patient notified of above recommendations. Patient voiced understanding and repeated instuctions to nurse to verify understanding.

## 2018-03-09 ENCOUNTER — PATIENT MESSAGE (OUTPATIENT)
Dept: UROLOGY | Facility: CLINIC | Age: 83
End: 2018-03-09

## 2018-03-23 ENCOUNTER — OFFICE VISIT (OUTPATIENT)
Dept: SURGERY | Facility: CLINIC | Age: 83
End: 2018-03-23
Payer: MEDICARE

## 2018-03-23 VITALS
HEART RATE: 99 BPM | WEIGHT: 158.81 LBS | BODY MASS INDEX: 22.73 KG/M2 | HEIGHT: 70 IN | TEMPERATURE: 98 F | DIASTOLIC BLOOD PRESSURE: 60 MMHG | SYSTOLIC BLOOD PRESSURE: 122 MMHG | OXYGEN SATURATION: 97 %

## 2018-03-23 DIAGNOSIS — N18.6 ESRD (END STAGE RENAL DISEASE) ON DIALYSIS: Primary | ICD-10-CM

## 2018-03-23 DIAGNOSIS — I25.10 CORONARY ARTERY DISEASE INVOLVING NATIVE CORONARY ARTERY OF NATIVE HEART WITHOUT ANGINA PECTORIS: ICD-10-CM

## 2018-03-23 DIAGNOSIS — Z45.2 ENCOUNTER FOR CENTRAL LINE CARE: ICD-10-CM

## 2018-03-23 DIAGNOSIS — Z99.2 ESRD (END STAGE RENAL DISEASE) ON DIALYSIS: Primary | ICD-10-CM

## 2018-03-23 DIAGNOSIS — Z79.01 ANTICOAGULATED: ICD-10-CM

## 2018-03-23 PROCEDURE — 99999 PR PBB SHADOW E&M-EST. PATIENT-LVL III: CPT | Mod: PBBFAC,,, | Performed by: SURGERY

## 2018-03-23 PROCEDURE — 99213 OFFICE O/P EST LOW 20 MIN: CPT | Mod: PBBFAC,PN | Performed by: SURGERY

## 2018-03-23 PROCEDURE — 99214 OFFICE O/P EST MOD 30 MIN: CPT | Mod: S$PBB,,, | Performed by: SURGERY

## 2018-03-23 NOTE — PROGRESS NOTES
Subjective:       Patient ID: Jose Maria Jr. is a 85 y.o. male.    Chief Complaint: Post-op Evaluation    HPI   Pt presents for eval s/p permacath removal 3/7/18. NO c/o. Taking baths. Healthy diet. No prob since last visit.    Review of Systems    All systems were reviewed and are negative, except that mentioned in the HPI.    Objective:      Physical Exam   Constitutional: He is oriented to person, place, and time. He appears well-developed and well-nourished. No distress.   HENT:   Head: Normocephalic and atraumatic.   Eyes: Conjunctivae and EOM are normal. Pupils are equal, round, and reactive to light. No scleral icterus.   Neck: Normal range of motion. Neck supple. No JVD present.   Cardiovascular: Normal rate and regular rhythm.    Pulmonary/Chest: Effort normal and breath sounds normal. No respiratory distress.   Right chest wound well-healed, c/d/i, no bandage   Abdominal: Soft. Bowel sounds are normal. He exhibits no distension.   Musculoskeletal: Normal range of motion. He exhibits no edema or tenderness.   RUE AVF +thrill/bruit   Neurological: He is alert and oriented to person, place, and time. No cranial nerve deficit.   Skin: Skin is warm and dry. He is not diaphoretic.   Psychiatric: He has a normal mood and affect.       Assessment:       1. ESRD (end stage renal disease) on dialysis    2. Coronary artery disease involving native coronary artery of native heart without angina pectoris    3. Anticoagulated    4. Encounter for central line care        Plan:     86yo male 2wk s/p removal of R chest permacath:  -pt has healed well  -ok to gradually resume unrestricted activity  -f/u prn  -all questions answered

## 2018-03-28 ENCOUNTER — TELEPHONE (OUTPATIENT)
Dept: UROLOGY | Facility: CLINIC | Age: 83
End: 2018-03-28

## 2018-03-28 ENCOUNTER — OFFICE VISIT (OUTPATIENT)
Dept: UROLOGY | Facility: CLINIC | Age: 83
End: 2018-03-28
Payer: MEDICARE

## 2018-03-28 VITALS
HEIGHT: 70 IN | BODY MASS INDEX: 22.62 KG/M2 | WEIGHT: 158 LBS | DIASTOLIC BLOOD PRESSURE: 71 MMHG | SYSTOLIC BLOOD PRESSURE: 140 MMHG | HEART RATE: 110 BPM | OXYGEN SATURATION: 98 % | RESPIRATION RATE: 17 BRPM

## 2018-03-28 DIAGNOSIS — N28.1 RENAL CYST: ICD-10-CM

## 2018-03-28 DIAGNOSIS — N40.1 BENIGN PROSTATIC HYPERPLASIA WITH WEAK URINARY STREAM: Primary | ICD-10-CM

## 2018-03-28 DIAGNOSIS — N18.6 ESRD (END STAGE RENAL DISEASE) ON DIALYSIS: ICD-10-CM

## 2018-03-28 DIAGNOSIS — R39.12 BENIGN PROSTATIC HYPERPLASIA WITH WEAK URINARY STREAM: Primary | ICD-10-CM

## 2018-03-28 DIAGNOSIS — Z99.2 ESRD (END STAGE RENAL DISEASE) ON DIALYSIS: ICD-10-CM

## 2018-03-28 PROCEDURE — 99999 PR PBB SHADOW E&M-EST. PATIENT-LVL III: CPT | Mod: PBBFAC,,, | Performed by: UROLOGY

## 2018-03-28 PROCEDURE — 99213 OFFICE O/P EST LOW 20 MIN: CPT | Mod: PBBFAC,PO | Performed by: UROLOGY

## 2018-03-28 PROCEDURE — 99205 OFFICE O/P NEW HI 60 MIN: CPT | Mod: S$PBB,,, | Performed by: UROLOGY

## 2018-03-28 RX ORDER — FINASTERIDE 5 MG/1
5 TABLET, FILM COATED ORAL DAILY
Qty: 30 TABLET | Refills: 11 | Status: SHIPPED | OUTPATIENT
Start: 2018-03-28

## 2018-03-28 RX ORDER — TAMSULOSIN HYDROCHLORIDE 0.4 MG/1
0.4 CAPSULE ORAL DAILY
Qty: 30 CAPSULE | Refills: 11 | Status: SHIPPED | OUTPATIENT
Start: 2018-03-28 | End: 2019-04-22

## 2018-03-28 NOTE — PROGRESS NOTES
Subjective:       Patient ID: Jose Maria Jr. is a 85 y.o. male.    Chief Complaint: Other (bladder problems referred by dr reddy)    84 yo WM with History of ESRD on Dialysis. On Flomax and Proscar for BPH. Doing well. History of Prostate Biopsy with Dr. Navarrete in the past.      Benign Prostatic Hypertrophy   This is a chronic problem. The current episode started more than 1 year ago. The problem has been gradually improving since onset. Irritative symptoms do not include frequency, nocturia or urgency. Obstructive symptoms include a slower stream. Obstructive symptoms do not include dribbling, incomplete emptying, an intermittent stream, straining or a weak stream. Pertinent negatives include no chills, dysuria, genital pain, hematuria, hesitancy, nausea or vomiting. AUA score is 0-7. He is not sexually active. Nothing aggravates the symptoms. Past treatments include tamsulosin and finasteride. The treatment provided significant relief. He has been using treatment for 2 or more years.     Review of Systems   Constitutional: Negative for activity change, appetite change, chills, diaphoresis, fatigue, fever and unexpected weight change.   HENT: Negative for congestion, hearing loss, sinus pressure and trouble swallowing.    Eyes: Negative for photophobia, pain, discharge and visual disturbance.   Respiratory: Negative for apnea, cough and shortness of breath.    Cardiovascular: Negative for chest pain, palpitations and leg swelling.   Gastrointestinal: Negative for abdominal distention, abdominal pain, anal bleeding, blood in stool, constipation, diarrhea, nausea, rectal pain and vomiting.   Endocrine: Negative for cold intolerance, heat intolerance, polydipsia, polyphagia and polyuria.   Genitourinary: Negative for decreased urine volume, difficulty urinating, discharge, dysuria, enuresis, flank pain, frequency, genital sores, hematuria, hesitancy, incomplete emptying, nocturia, penile pain, penile swelling,  scrotal swelling, testicular pain and urgency.   Musculoskeletal: Negative for arthralgias, back pain and myalgias.   Skin: Negative for color change, pallor, rash and wound.   Allergic/Immunologic: Negative for environmental allergies, food allergies and immunocompromised state.   Neurological: Negative for dizziness, seizures, weakness and headaches.   Hematological: Negative for adenopathy. Does not bruise/bleed easily.   Psychiatric/Behavioral: Negative.        Objective:      Physical Exam   Nursing note and vitals reviewed.  Constitutional: He is oriented to person, place, and time. He appears well-developed and well-nourished.   HENT:   Head: Normocephalic.   Nose: Nose normal.   Mouth/Throat: Oropharynx is clear and moist.   Eyes: Conjunctivae and EOM are normal. Pupils are equal, round, and reactive to light.   Neck: Normal range of motion. Neck supple.   Cardiovascular: Normal rate, regular rhythm, normal heart sounds and intact distal pulses.    Pulmonary/Chest: Effort normal and breath sounds normal.   Abdominal: Soft. Bowel sounds are normal.   Genitourinary: Rectum normal, testes normal and penis normal. Prostate is not tender. Cremasteric reflex is present. Circumcised.   Musculoskeletal: Normal range of motion.   Neurological: He is alert and oriented to person, place, and time. He has normal reflexes.   Skin: Skin is warm and dry.     Psychiatric: He has a normal mood and affect. His behavior is normal. Judgment and thought content normal.       Assessment:       1. Benign prostatic hyperplasia with weak urinary stream    2. ESRD (end stage renal disease) on dialysis    3. Renal cyst        Plan:       Patient Instructions   Continue Proscar and Flomax  Renal U/S  F/U 1 year.

## 2018-03-28 NOTE — TELEPHONE ENCOUNTER
----- Message from Sandy Mckeon sent at 3/28/2018  8:13 AM CDT -----  Contact: [phone]/Yulisa pt's wife   Pt's wife called to verify pt's appointment for today and we told her there was nothing schedule and the pt's appointment was cancel . Pt's wife its really upset because pt's appointment was cancel with out given any notice . Pt's wife states pt needs to be seen today . Please advise

## 2018-03-28 NOTE — TELEPHONE ENCOUNTER
----- Message from Cortez Horne sent at 3/28/2018  8:13 AM CDT -----  Contact: [phone]/ Yulisa/wife  The patient called to confirm 11:30 appt today they want to know why it was cancelled and no one notified them.

## 2018-03-28 NOTE — TELEPHONE ENCOUNTER
-- Holding home meds  -- BP WNL at present    ov for patient to come in today at 1120.  [facility]

## 2018-04-05 ENCOUNTER — TELEPHONE (OUTPATIENT)
Dept: UROLOGY | Facility: CLINIC | Age: 83
End: 2018-04-05

## 2018-04-05 NOTE — TELEPHONE ENCOUNTER
----- Message from Abel Riggins MD sent at 4/5/2018  8:26 AM CDT -----  Notify pt that he has bilateral simple renal cysts and will require yearly u/s follow up.

## 2018-04-10 ENCOUNTER — HOSPITAL ENCOUNTER (OUTPATIENT)
Facility: HOSPITAL | Age: 83
Discharge: HOME | End: 2018-04-10
Attending: INTERNAL MEDICINE | Admitting: INTERNAL MEDICINE
Payer: MEDICARE

## 2018-04-10 VITALS
BODY MASS INDEX: 22.62 KG/M2 | HEART RATE: 79 BPM | SYSTOLIC BLOOD PRESSURE: 169 MMHG | DIASTOLIC BLOOD PRESSURE: 73 MMHG | TEMPERATURE: 97 F | WEIGHT: 158 LBS | OXYGEN SATURATION: 98 % | HEIGHT: 70 IN | RESPIRATION RATE: 18 BRPM

## 2018-04-10 DIAGNOSIS — T82.858A STENOSIS OF ARTERIOVENOUS DIALYSIS FISTULA, INITIAL ENCOUNTER: Primary | ICD-10-CM

## 2018-04-10 DIAGNOSIS — T82.590D MALFUNCTION OF ARTERIOVENOUS DIALYSIS FISTULA, SUBSEQUENT ENCOUNTER: ICD-10-CM

## 2018-04-10 DIAGNOSIS — T82.590A MALFUNCTION OF ARTERIOVENOUS DIALYSIS FISTULA, INITIAL ENCOUNTER: ICD-10-CM

## 2018-04-10 DIAGNOSIS — T85.9XXA UNSPECIFIED COMPLICATION OF INTERNAL PROSTHETIC DEVICE, IMPLANT AND GRAFT, INITIAL ENCOUNTER: ICD-10-CM

## 2018-04-10 LAB — PERIPHERAL PTA: YES

## 2018-04-10 PROCEDURE — 63600175 PHARM REV CODE 636 W HCPCS

## 2018-04-10 PROCEDURE — 99152 MOD SED SAME PHYS/QHP 5/>YRS: CPT

## 2018-04-10 PROCEDURE — 36558 INSERT TUNNELED CV CATH: CPT | Mod: ,,, | Performed by: INTERNAL MEDICINE

## 2018-04-10 PROCEDURE — 25000003 PHARM REV CODE 250

## 2018-04-10 PROCEDURE — 77001 FLUOROGUIDE FOR VEIN DEVICE: CPT | Mod: 26,,, | Performed by: INTERNAL MEDICINE

## 2018-04-10 PROCEDURE — C1769 GUIDE WIRE: HCPCS

## 2018-04-10 PROCEDURE — 99152 MOD SED SAME PHYS/QHP 5/>YRS: CPT | Mod: ,,, | Performed by: INTERNAL MEDICINE

## 2018-04-10 PROCEDURE — C1750 CATH, HEMODIALYSIS,LONG-TERM: HCPCS

## 2018-04-10 RX ORDER — ONDANSETRON 2 MG/ML
4 INJECTION INTRAMUSCULAR; INTRAVENOUS DAILY PRN
Status: DISCONTINUED | OUTPATIENT
Start: 2018-04-10 | End: 2018-04-10 | Stop reason: HOSPADM

## 2018-04-10 RX ORDER — FENTANYL CITRATE 50 UG/ML
25 INJECTION, SOLUTION INTRAMUSCULAR; INTRAVENOUS EVERY 5 MIN PRN
Status: DISCONTINUED | OUTPATIENT
Start: 2018-04-10 | End: 2018-04-10 | Stop reason: HOSPADM

## 2018-04-10 RX ORDER — SODIUM CHLORIDE 0.9 % (FLUSH) 0.9 %
3 SYRINGE (ML) INJECTION EVERY 8 HOURS
Status: DISCONTINUED | OUTPATIENT
Start: 2018-04-10 | End: 2018-04-10 | Stop reason: HOSPADM

## 2018-04-10 RX ORDER — MEPERIDINE HYDROCHLORIDE 50 MG/ML
12.5 INJECTION INTRAMUSCULAR; INTRAVENOUS; SUBCUTANEOUS ONCE AS NEEDED
Status: DISCONTINUED | OUTPATIENT
Start: 2018-04-10 | End: 2018-04-10 | Stop reason: HOSPADM

## 2018-04-10 NOTE — PROCEDURES
DATE OF PROCEDURE: 4/10/18     PROCEDURE TYPE:   1. Placement of left internal jugular vein tunneled dialysis catheter.  2. Conscious Sedation     INDICATIONS: AVF malfunction/ HD access     Pre-procedure Diagnoses:   ESRD    Post-procedure diagnosis:  ESRD    Operators: Bharathi Wong MD     PROCEDURE NOTE: After obtaining consent, the patient was taken to the [facility] suite. Sedation was administered. The neck and chest were prepped and draped in usual sterile fashion. We began using ultrasound guidance to examine the left internal jugular vein. It appeared to be widely patent and was free of thrombus that appeared suitable for access for HD catheter. We accessed the left internal jugular vein using a Seldinger technique with an micropunture needle without difficulty, then the thin wire was passed into the superior vena cava through the heart into the inferior vena cava. The wire position was confirmed with intraoperative fluoroscopy, then a 5Fr sheath was introduced over the wire. The thin wire was removed and the the guidewire was passed into the IVC under fluoroscopic guidance. The 5Fr sheath was removed and the left internal jugular vein was dilated with serial dilator. Then a tear-away sheath was then introduced over the guidewire and the dilator was removed. Then a 28cm Palindrome catheter was inserted into the tear-away sheath over the wire and the catheter was advanced into the RA using fluoroscopic guidance. The sheath was teared away. A counterincision was made at the venotomy site and was blunt dissected. A suitable area on the chest was then selected; anesthesia again obtained with 2% lidocaine with Epinephrine. Using a tunnel device, tunnel was constructed inserting it from the exit site to the venotomy site. Using the tunnel device, the catheter was pulled back through the exit site. The tip of the catheter was confirmed to be in the center of the RA via fluoroscopy. The lock hub was connected. There  was excellent aspiration and flush through both ports. The venotomy site was then closed with 3-0 Vicryl in a subcuticular stitch and the exit site was closed with 3-0 Prolene in a wrapped stitch. He tolerated the procedure well. There were no immediate complications. Estimated blood loss was less than 5 mL. He was then discharged from the Nephrology Access Suite in stable condition back to his room.    Anesthesia:  Conscious sedation was achieved with 125 mcg of Fentanyl. Local anesthesia was achieved with 20 ml of Lidocaine 2%. Moderate conscious sedation was performed and cardiorespiratory functions were monitored the entire procedure by me and RN. Sedation began at 1433pm and concluded at 1543pm, totaling 70 minutes.

## 2018-04-10 NOTE — BRIEF OP NOTE
[facility]  Brief Operative Note  Nephrology     SUMMARY     Surgery Date: 4/10/2018     Surgeon(s) and Role:     * Bharathi Wong MD - Primary    Assisting Surgeon: None    Pre-op Diagnosis:  ESRD (end stage renal disease) [N18.6]  Complications due to device, implant, and graft, initial encounter [T85.9XXA]    Post-op Diagnosis: * No Diagnosis Codes entered *    Procedure Performed:     Procedure(s) (LRB):  FISTULOGRAM (N/A)   TDC    Estimated Blood Loss: 10cc         Specimens:   Specimen (12h ago through future)    None

## 2018-04-10 NOTE — PROGRESS NOTES
Pt is AAOx3 and in no apparent distress.  Provided a copy of discharge instructions.  Teaching performed.  Pt verbalized understanding and denied any questions. PIV d/c catheter tip intact.  2x2 applied and no active bleeding noted.

## 2018-04-10 NOTE — PROCEDURES
DATE OF PROCEDURE:  04/10/2018     PROCEDURE TYPE:  Fistulogram of right brachiocephalic fistula.     INDICATION:  Difficulty cannulation/hematoma.     : Bharathi Wong M.D.     POSTPROCEDURE DIAGNOSES:    1. Superior venacavogram, subclavian venogram, axillary venogram, cephalic venogram.    2. High-grade juxta-anastomotic stenosis, long segment, which was 90% and was balloon angioplastied with 6 mm x 6 cm Ultraverse balloon followed by 7 mm x 8 cm [place] balloon. Post-angioplasty angiogram revealed 20% residual stenosis, but very poor blood flow and clearance of contrast material.   3. Tunneled HD catheter placement. (see procedure note)    PROCEDURE NOTE IN DETAIL:  After informed consent was obtained from Mr. Maria, he was brought into Access, placed in supine position.  The area of his right brachiocephalic fistula was cleaned and draped in the usual sterile fashion.  After achieving local anesthesia with lidocaine and epinephrine, access was cannulated with micropuncture needle.  The mandril wire was advanced.  A 5-Norwegian sheath was established.  Multiple angiographic runs revealed there was patent superior vena cava, subclavian vein, axillary vein, cephalic vein but there was a juxta-anastomotic stenosis, long segment, high-grade, which was 90%. Also it was noted a very sluggish flow/clearance of contrast material. Reflux arteriogram showed multiple collateral veins close to the arterial anastomosis without stenosis. Followed by that, a 6-Norwegian sheath and then a 6 mm x 6 cm Ultraverse balloon was used to angioplasty the entire juxta-anastomotic area.  Post-angioplasty angiogram revealed 50% residual stenosis. Again, poor flow and clearance of contrast material. Then we used a 7 mm x 8 cm [place] balloon to angioplasty the residual stenosis.  Post-angioplasty angiogram revealed 20% residual stenosis. Again, poor flows and clearance of contrast material.  Given the nature of the lesions, poor inflow,  small cephalic vein with poor flows/clearance of contrast, multiple collaterals and difficulty cannulation, I decided to abandon AVF and place a tunneled HD catheter.  The patient tolerated the procedure well. After removing the sheath, 3-0 Prolene suture was placed to secure the hemostasis.  No immediate complications.  Estimated blood loss was 10mL.  The patient was discharged from Access Suite in a stable condition.    Plan: will follow with vascular surgery for new AV access creation.

## 2018-04-10 NOTE — PROGRESS NOTES
Pts dressing to L upper chest permacath and dressing to R upper arm fistula are both c/d/i without redness or swelling.  Pt's family member is transporting pt in wheelchair to garage for discharge home.

## 2018-04-10 NOTE — DISCHARGE SUMMARY
[facility]  Discharge Note  Short Stay    Admit Date: 4/10/2018    Discharge Date and Time: 4/10/18    Attending Physician: Bharathi Wong MD     Discharge Provider: Bharathi Wong    Diagnoses:  Active Hospital Problems    Diagnosis  POA    Dialysis AV fistula malfunction [T82.590A]  Yes      Resolved Hospital Problems    Diagnosis Date Resolved POA   No resolved problems to display.       Discharged Condition: stable    Hospital Course: Patient was admitted for an outpatient procedure and tolerated the procedure well with no complications.    Final Diagnoses: Same as principal problem.    Disposition: Home or Self Care    Follow up/Patient Instructions:  F/u PRN. Instruction given.   Medications:  Reconciled Home Medications:      Medication List      ASK your doctor about these medications    ACCU-CHEK TANIKA PLUS TEST STRP Strp  Generic drug:  blood sugar diagnostic  once daily.     allopurinol 100 MG tablet  Commonly known as:  ZYLOPRIM  Take 1 tablet (100 mg total) by mouth once daily.     aspirin 81 MG Chew  Take 81 mg by mouth once daily.     calcitRIOL 0.25 MCG Cap  Commonly known as:  ROCALTROL  Take 1 capsule (0.25 mcg total) by mouth every Mon, Wed, Fri.     finasteride 5 mg tablet  Commonly known as:  PROSCAR  Take 1 tablet (5 mg total) by mouth once daily.     furosemide 80 MG tablet  Commonly known as:  LASIX  Take 1 tablet (80 mg total) by mouth 2 (two) times daily.     isosorbide mononitrate 120 MG 24 hr tablet  Commonly known as:  IMDUR  Take 1 tablet (120 mg total) by mouth once daily.     levothyroxine 25 MCG tablet  Commonly known as:  SYNTHROID  Take 1 tablet (25 mcg total) by mouth once daily.     lidocaine-prilocaine cream  Commonly known as:  EMLA  Apply topically every Tues, Thurs, Sat.     metoprolol succinate 50 MG 24 hr tablet  Commonly known as:  TOPROL-XL  Take 1 tablet (50 mg total) by mouth once daily.     multivitamin capsule  Take 1 capsule by mouth once daily.      nitroGLYCERIN 0.4 MG SL tablet  Commonly known as:  NITROSTAT  Place 1 tablet (0.4 mg total) under the tongue every 5 (five) minutes as needed for Chest pain.     omega-3 acid ethyl esters 1 gram capsule  Commonly known as:  LOVAZA  Take 1 capsule (1 g total) by mouth 2 (two) times daily.     omeprazole 20 MG capsule  Commonly known as:  PRILOSEC     rosuvastatin 10 MG tablet  Commonly known as:  CRESTOR  Take 1 tablet (10 mg total) by mouth once daily.     tamsulosin 0.4 mg Cp24  Commonly known as:  FLOMAX  Take 1 capsule (0.4 mg total) by mouth once daily.     temazepam 30 mg capsule  Commonly known as:  RESTORIL     ticagrelor 90 mg tablet  Commonly known as:  BRILINTA  Take 1 tablet (90 mg total) by mouth 2 (two) times daily.          No discharge procedures on file.      Discharge Procedure Orders: Resume previous diet and activity.

## 2018-04-10 NOTE — PLAN OF CARE
Problem: Patient Care Overview  Goal: Plan of Care Review  Outcome: Ongoing (interventions implemented as appropriate)  Report received from YOANA Gonzalez. Patient s/p fistulagram. Dressing cdi. Vss. Call light in reach. Patient tolerating PO. Will monitor.

## 2018-04-10 NOTE — H&P
[facility]  History & Physical    Subjective:      Chief Complaint/Reason for Admission: Malfunction of the  Right BC AVF    Jose Maria Jr. is a 85 y.o. male with Right BC AVF presents here with difficulty in cannulation/hematoma of the AV access. Last HD was Saturday.     Past Medical History:   Diagnosis Date    Anticoagulant long-term use     Arthritis     Basal cell carcinoma     BPH (benign prostatic hyperplasia)     CHF (congestive heart failure)     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 4/7/2016    Coronary artery disease     Diabetes mellitus     Encounter for blood transfusion     Gout     High cholesterol     Hypertension     Persistent proteinuria 2/5/2017    Renal cyst 2/5/2017    Thyroid disease     Urinary tract infection      Past Surgical History:   Procedure Laterality Date    aaa bypass      GALLBLADDER SURGERY      KNEE SURGERY       Family History   Problem Relation Age of Onset    Prostate cancer Neg Hx     Kidney disease Neg Hx      Social History   Substance Use Topics    Smoking status: Never Smoker    Smokeless tobacco: Never Used    Alcohol use No       Facility-Administered Medications Prior to Admission   Medication    epoetin edwin injection 20,000 Units     PTA Medications   Medication Sig    ACCU-CHEK TANIKA PLUS TEST STRP Strp once daily.     allopurinol (ZYLOPRIM) 100 MG tablet Take 1 tablet (100 mg total) by mouth once daily.    aspirin 81 MG Chew Take 81 mg by mouth once daily.    calcitRIOL (ROCALTROL) 0.25 MCG Cap Take 1 capsule (0.25 mcg total) by mouth every Mon, Wed, Fri.    finasteride (PROSCAR) 5 mg tablet Take 1 tablet (5 mg total) by mouth once daily.    furosemide (LASIX) 80 MG tablet Take 1 tablet (80 mg total) by mouth 2 (two) times daily.    levothyroxine (SYNTHROID) 25 MCG tablet Take 1 tablet (25 mcg total) by mouth once daily.    lidocaine-prilocaine (EMLA) cream Apply topically every Tues, Thurs, Sat.     metoprolol succinate (TOPROL-XL) 50 MG 24 hr tablet Take 1 tablet (50 mg total) by mouth once daily.    multivitamin capsule Take 1 capsule by mouth once daily.    omega-3 acid ethyl esters (LOVAZA) 1 gram capsule Take 1 capsule (1 g total) by mouth 2 (two) times daily.    omeprazole (PRILOSEC) 20 MG capsule     rosuvastatin (CRESTOR) 10 MG tablet Take 1 tablet (10 mg total) by mouth once daily. (Patient taking differently: Take 40 mg by mouth once daily. )    temazepam (RESTORIL) 30 mg capsule     ticagrelor (BRILINTA) 90 mg tablet Take 1 tablet (90 mg total) by mouth 2 (two) times daily.    isosorbide mononitrate (IMDUR) 120 MG 24 hr tablet Take 1 tablet (120 mg total) by mouth once daily.    nitroGLYCERIN (NITROSTAT) 0.4 MG SL tablet Place 1 tablet (0.4 mg total) under the tongue every 5 (five) minutes as needed for Chest pain.    tamsulosin (FLOMAX) 0.4 mg Cp24 Take 1 capsule (0.4 mg total) by mouth once daily.     Review of patient's allergies indicates:   Allergen Reactions    Benadryl [diphenhydramine hcl] Rash    Versed [midazolam] Rash        ROS  Constitutional: No fever or chills, no weight changes.  Eyes: No visual changes or photophobia  HEENT: No nasal congestion or sore throat  Respiratory: No cough or shortness of breath  Cardiovascular: No chest pain or palpitations  Gastrointestinal: Good appetite, no nausea or vomiting, no change in bowel habits  Genitourinary: No hematuria or dysuria  Skin: No rash or pruritis  Hematologic/lymphatic: No easy bruising, bleeding or lymphadenopathy  Musculoskeletal: No arthralgias or myalgias  Neurological: No seizures or tremors  Endocrine: No heat/cold intolerance.  No polyuria/polydipsia.  Psychiatric:  No depression or anxiety.  Objective:      Vital Signs (Most Recent)  Temp: 97.5 °F (36.4 °C) (04/10/18 1136)  Pulse: 84 (04/10/18 1136)  Resp: 16 (04/10/18 1136)  BP: 134/60 (04/10/18 1136)  SpO2: 98 % (04/10/18 1136)    Vital Signs Range (Last  24H):  Temp:  [97.5 °F (36.4 °C)]   Pulse:  [84]   Resp:  [16]   BP: (134)/(60)   SpO2:  [98 %]     Physical Exam  General appearance: Well developed, well nourished  Head: Normocephalic, atraumatic  Eyes:  Conjunctivae nl. Sclera anicteric. PERRL.  HEENT: Lips, mucosa, and tongue normal; teeth and gums normal and oropharynx clear.  Neck: Supple, trachea midline, thyroid not enlarged,   Lungs: Clear to auscultation bilaterally and normal respiratory effort  Heart: Regular rate and rhythm, S1, S2 normal, no murmur, click, rub or gallop  Abdomen: Soft, non-tender non-distended; bowel sounds normal; no masses,  no organomegaly  Extremities: No cyanosis or clubbing. No edema  Pulses: 2+ and symmetric  Skin: Skin color, texture, turgor normal. No rashes or lesions  Lymph nodes: Cervical, supraclavicular, and axillary nodes normal.  Neurologic: Normal strength and tone. No focal numbness or weakness  Psychiatric:  Alert and oriented times 3.  Affect appropriate.  Right BC AVF; Good thrill at AA, water hammer pulse, high pitched dis continous systolic only murmur  Assessment:    Right BC AVF  Active Hospital Problems    Diagnosis  POA    Dialysis AV fistula malfunction [T82.590A]  Yes      Resolved Hospital Problems    Diagnosis Date Resolved POA   No resolved problems to display.       Plan:    1. Fistulagram with possible PTA vs TDC.

## 2018-04-25 PROBLEM — N18.6 END STAGE RENAL DISEASE: Status: ACTIVE | Noted: 2018-04-25

## 2018-06-05 RX ORDER — LEVOTHYROXINE SODIUM 25 UG/1
25 TABLET ORAL DAILY
Qty: 90 TABLET | Refills: 0 | Status: SHIPPED | OUTPATIENT
Start: 2018-06-05 | End: 2018-09-08 | Stop reason: SDUPTHER

## 2018-06-05 NOTE — TELEPHONE ENCOUNTER
Per last office visit with Dr. Felix, pt was to follow up 5/1/18, pt now scheduled for 6/22/18 with Dr. Corona

## 2018-06-05 NOTE — TELEPHONE ENCOUNTER
----- Message from Sandy Mckeon sent at 6/5/2018 10:56 AM CDT -----  Contact: [phone]/ self   Pt requesting a refill on rx levothyroxine (SYNTHROID) 25 MCG tablet sent to [facility] [phone] . Please advise

## 2018-06-18 ENCOUNTER — OFFICE VISIT (OUTPATIENT)
Dept: SURGERY | Facility: CLINIC | Age: 83
End: 2018-06-18
Payer: MEDICARE

## 2018-06-18 VITALS
OXYGEN SATURATION: 97 % | HEIGHT: 70 IN | BODY MASS INDEX: 22.88 KG/M2 | DIASTOLIC BLOOD PRESSURE: 60 MMHG | TEMPERATURE: 98 F | WEIGHT: 159.81 LBS | HEART RATE: 96 BPM | SYSTOLIC BLOOD PRESSURE: 128 MMHG

## 2018-06-18 DIAGNOSIS — I25.10 CORONARY ARTERY DISEASE INVOLVING NATIVE CORONARY ARTERY OF NATIVE HEART WITHOUT ANGINA PECTORIS: ICD-10-CM

## 2018-06-18 DIAGNOSIS — Z99.2 ESRD (END STAGE RENAL DISEASE) ON DIALYSIS: ICD-10-CM

## 2018-06-18 DIAGNOSIS — Z79.01 ANTICOAGULATED: ICD-10-CM

## 2018-06-18 DIAGNOSIS — N18.6 ESRD (END STAGE RENAL DISEASE) ON DIALYSIS: ICD-10-CM

## 2018-06-18 DIAGNOSIS — Z45.2 ENCOUNTER FOR CENTRAL LINE CARE: Primary | ICD-10-CM

## 2018-06-18 PROCEDURE — 99214 OFFICE O/P EST MOD 30 MIN: CPT | Mod: PBBFAC,PN | Performed by: SURGERY

## 2018-06-18 PROCEDURE — 99999 PR PBB SHADOW E&M-EST. PATIENT-LVL IV: CPT | Mod: PBBFAC,,, | Performed by: SURGERY

## 2018-06-18 PROCEDURE — 99215 OFFICE O/P EST HI 40 MIN: CPT | Mod: 57,S$PBB,, | Performed by: SURGERY

## 2018-06-18 NOTE — PROGRESS NOTES
Subjective:       Patient ID: Jose Maria Jr. is a 86 y.o. male.    Chief Complaint: Consult (remove dialysis cath)    HPI   Pt is s/p AVF RUE , then AVG LUE 4/25/2018. AVG working well. Pt referred for eval and planning to remove LIJ permacath. No new c/o. Feels well. Daughter Lakia present.    Review of Systems    All systems were reviewed and are negative, except that mentioned in the HPI.    Objective:      Physical Exam   Constitutional: He is oriented to person, place, and time. He appears well-developed and well-nourished. No distress.   HENT:   Head: Normocephalic and atraumatic.   Eyes: Conjunctivae and EOM are normal. Pupils are equal, round, and reactive to light. No scleral icterus.   Neck: Normal range of motion. Neck supple. No JVD present.   Cardiovascular: Normal rate.    Pulmonary/Chest: Effort normal and breath sounds normal. No respiratory distress.   LIJ permacath in place   Abdominal: Soft. Bowel sounds are normal. He exhibits no distension.   Musculoskeletal: Normal range of motion. He exhibits no edema or tenderness.   Neurological: He is alert and oriented to person, place, and time. No cranial nerve deficit.   Skin: Skin is warm and dry. He is not diaphoretic.   Psychiatric: He has a normal mood and affect.       Assessment:       1. ESRD (end stage renal disease) on dialysis    2. Coronary artery disease involving native coronary artery of native heart without angina pectoris    3. Anticoagulated    4. Encounter for central line care        Plan:       The pathology of the patient's disease was discussed: functioning LUE AVG in need of permacath removal. Written handouts were explained and given to the patient.  Elective permacath removal was recommended. The surgical procedure, risks, postop recovery and consent were discussed. All questions were answered and the consent was signed. Will contact Dr. Kim for preop assessment. Pt thinks he continued Brilinta for AVG surgery so will  likely continue for permacath removal. Surgery scheduled for 6/25/2018 by pt request.  (Pt has HD T-Th-Sat).

## 2018-06-22 ENCOUNTER — TELEPHONE (OUTPATIENT)
Dept: SURGERY | Facility: CLINIC | Age: 83
End: 2018-06-22

## 2018-06-22 ENCOUNTER — OFFICE VISIT (OUTPATIENT)
Dept: FAMILY MEDICINE | Facility: CLINIC | Age: 83
End: 2018-06-22
Payer: MEDICARE

## 2018-06-22 VITALS
BODY MASS INDEX: 23.6 KG/M2 | WEIGHT: 159.81 LBS | DIASTOLIC BLOOD PRESSURE: 52 MMHG | HEART RATE: 94 BPM | RESPIRATION RATE: 18 BRPM | SYSTOLIC BLOOD PRESSURE: 124 MMHG | TEMPERATURE: 98 F | OXYGEN SATURATION: 96 %

## 2018-06-22 DIAGNOSIS — I25.709 CORONARY ARTERY DISEASE INVOLVING CORONARY BYPASS GRAFT OF NATIVE HEART WITH ANGINA PECTORIS: ICD-10-CM

## 2018-06-22 DIAGNOSIS — Z99.2 ANEMIA IN CHRONIC KIDNEY DISEASE, ON CHRONIC DIALYSIS: ICD-10-CM

## 2018-06-22 DIAGNOSIS — R39.12 BENIGN PROSTATIC HYPERPLASIA WITH WEAK URINARY STREAM: ICD-10-CM

## 2018-06-22 DIAGNOSIS — N18.6 END STAGE RENAL DISEASE: ICD-10-CM

## 2018-06-22 DIAGNOSIS — D63.1 ANEMIA IN CHRONIC KIDNEY DISEASE, ON CHRONIC DIALYSIS: ICD-10-CM

## 2018-06-22 DIAGNOSIS — N25.81 SECONDARY HYPERPARATHYROIDISM: ICD-10-CM

## 2018-06-22 DIAGNOSIS — N18.6 ANEMIA IN CHRONIC KIDNEY DISEASE, ON CHRONIC DIALYSIS: ICD-10-CM

## 2018-06-22 DIAGNOSIS — Z99.2 ESRD (END STAGE RENAL DISEASE) ON DIALYSIS: Primary | ICD-10-CM

## 2018-06-22 DIAGNOSIS — N28.1 RENAL CYST: ICD-10-CM

## 2018-06-22 DIAGNOSIS — E88.09 HYPOALBUMINEMIA DUE TO PROTEIN-CALORIE MALNUTRITION: ICD-10-CM

## 2018-06-22 DIAGNOSIS — I50.22 CHRONIC SYSTOLIC HEART FAILURE: ICD-10-CM

## 2018-06-22 DIAGNOSIS — I25.2 HISTORY OF MI (MYOCARDIAL INFARCTION): ICD-10-CM

## 2018-06-22 DIAGNOSIS — E46 HYPOALBUMINEMIA DUE TO PROTEIN-CALORIE MALNUTRITION: ICD-10-CM

## 2018-06-22 DIAGNOSIS — I25.10 CORONARY ARTERY DISEASE INVOLVING NATIVE CORONARY ARTERY OF NATIVE HEART WITHOUT ANGINA PECTORIS: ICD-10-CM

## 2018-06-22 DIAGNOSIS — E78.49 OTHER HYPERLIPIDEMIA: ICD-10-CM

## 2018-06-22 DIAGNOSIS — N40.1 BENIGN PROSTATIC HYPERPLASIA WITH WEAK URINARY STREAM: ICD-10-CM

## 2018-06-22 DIAGNOSIS — N18.6 ESRD (END STAGE RENAL DISEASE) ON DIALYSIS: Primary | ICD-10-CM

## 2018-06-22 PROBLEM — M79.89 SWELLING OF RIGHT UPPER EXTREMITY: Status: RESOLVED | Noted: 2017-10-08 | Resolved: 2018-06-22

## 2018-06-22 PROCEDURE — 99214 OFFICE O/P EST MOD 30 MIN: CPT | Mod: S$PBB,,, | Performed by: INTERNAL MEDICINE

## 2018-06-22 PROCEDURE — 99213 OFFICE O/P EST LOW 20 MIN: CPT | Mod: PBBFAC,PO | Performed by: INTERNAL MEDICINE

## 2018-06-22 PROCEDURE — 99999 PR PBB SHADOW E&M-EST. PATIENT-LVL III: CPT | Mod: PBBFAC,,, | Performed by: INTERNAL MEDICINE

## 2018-06-22 RX ORDER — ROSUVASTATIN CALCIUM 40 MG/1
TABLET, COATED ORAL
Refills: 3 | COMMUNITY
Start: 2018-06-04

## 2018-06-22 RX ORDER — OXYCODONE AND ACETAMINOPHEN 5; 325 MG/1; MG/1
TABLET ORAL
Refills: 0 | COMMUNITY
Start: 2018-04-25 | End: 2019-01-21

## 2018-06-22 RX ORDER — TRAMADOL HYDROCHLORIDE 50 MG/1
TABLET ORAL
Refills: 0 | COMMUNITY
Start: 2018-05-18 | End: 2019-01-21

## 2018-06-22 NOTE — TELEPHONE ENCOUNTER
6-22-18 1028  Per Dr. Grace, patient notified to stop Brilinta today and to continue with ASA 81 mg, per Dr. Gorman. Patient voiced understanding by repeating instructions.

## 2018-06-22 NOTE — PATIENT INSTRUCTIONS
We have reviewed your prescription medications. I reviewed your BP readings and they look great. I also reviewed recent labs from dialysis and they are stable. Potassium had improved from the last one here.

## 2018-06-22 NOTE — PROGRESS NOTES
Subjective:       Patient ID: Jose Maria Jr. is a 86 y.o. male.    Chief Complaint: Establish Care     I have reviewed the PMH,  and  for this patient. This is a new patient to me. He is a former patient of Dr. Felix who is here to establish care. HE is an ESRD patient. He has a BP log and recent labs from dialysis with him that I reviewed.  He is accompanied by his wife who assists with history. He has been doing well. He is not taking any BP medication at this time. HE had an elevated potassium on our last labs, but it was good when they repeated it at dialysis. He has had his dialysis shunt redone and it is working well now. He feels pretty good.       Review of Systems   Constitutional: Negative for chills, fatigue and fever.   HENT: Negative for congestion, ear discharge, ear pain, rhinorrhea and sore throat.    Eyes: Negative for discharge, redness and itching.   Respiratory: Negative for cough, chest tightness, shortness of breath and wheezing.    Cardiovascular: Negative for chest pain, palpitations and leg swelling.   Gastrointestinal: Negative for abdominal pain, constipation, diarrhea, nausea and vomiting.   Endocrine: Negative for cold intolerance and heat intolerance.   Genitourinary: Negative for dysuria, flank pain, frequency and hematuria.   Musculoskeletal: Negative for arthralgias, back pain, joint swelling and myalgias.   Skin: Negative for color change and rash.   Neurological: Negative for dizziness, tremors, numbness and headaches.   Psychiatric/Behavioral: Negative for dysphoric mood and sleep disturbance. The patient is not nervous/anxious.        Objective:      Physical Exam   Constitutional: He appears well-developed and well-nourished.   HENT:   Head: Normocephalic and atraumatic.   Right Ear: External ear normal. Tympanic membrane is not erythematous. No middle ear effusion.   Left Ear: External ear normal. Tympanic membrane is not erythematous.  No middle ear effusion.    Mouth/Throat: No posterior oropharyngeal edema or posterior oropharyngeal erythema. No tonsillar exudate.   Eyes: Conjunctivae and EOM are normal. Pupils are equal, round, and reactive to light.   Droopy eyelids on both sides   Neck: No thyromegaly present.   Cardiovascular: Normal rate, regular rhythm, normal heart sounds and intact distal pulses.    No murmur heard.  dialysis shunt in right arm has a good thrill   Pulmonary/Chest: Effort normal and breath sounds normal. He has no wheezes.   Abdominal: He exhibits no distension. There is no tenderness.   Musculoskeletal: He exhibits no edema or tenderness.   Lymphadenopathy:     He has no cervical adenopathy.   Neurological: He displays normal reflexes. No cranial nerve deficit.   Skin: Skin is warm and dry. No rash noted.   Psychiatric: He has a normal mood and affect. His behavior is normal.       Assessment and Plan:     Problem List Items Addressed This Visit     Coronary artery disease involving native coronary artery of native heart without angina pectoris - stable      HLD (hyperlipidemia) - stable. On medication      ESRD (end stage renal disease) on dialysis - Primary - HE goes to dialysis 3 times a week. Stable.       BPH (benign prostatic hyperplasia) - follow-up with Dr. Riggins      Renal cyst - follow-up with Dr. Riggins      Secondary hyperparathyroidism - PTH was a little better this time. Due to renal failure.       Chronic systolic heart failure - stable.       Coronary artery disease involving coronary bypass graft of native heart with angina pectoris - stable sees cardiology      Hypoalbuminemia due to protein-calorie malnutrition - encouraged to eat better.       End stage renal disease- chronic. Stable.       History of MI (myocardial infarction) - stable.       Anemia in chronic kidney disease, on chronic dialysis - Hct 37 last time. Stable.

## 2018-06-25 PROBLEM — Z79.01 ANTICOAGULATED: Status: ACTIVE | Noted: 2018-06-25

## 2018-07-12 ENCOUNTER — OFFICE VISIT (OUTPATIENT)
Dept: SURGERY | Facility: CLINIC | Age: 83
End: 2018-07-12
Payer: MEDICARE

## 2018-07-12 VITALS
HEART RATE: 88 BPM | BODY MASS INDEX: 24.24 KG/M2 | SYSTOLIC BLOOD PRESSURE: 140 MMHG | DIASTOLIC BLOOD PRESSURE: 50 MMHG | TEMPERATURE: 98 F | HEIGHT: 69 IN | WEIGHT: 163.63 LBS

## 2018-07-12 DIAGNOSIS — Z45.2 ENCOUNTER FOR CENTRAL LINE CARE: ICD-10-CM

## 2018-07-12 DIAGNOSIS — N18.6 ESRD (END STAGE RENAL DISEASE) ON DIALYSIS: Primary | ICD-10-CM

## 2018-07-12 DIAGNOSIS — Z99.2 ESRD (END STAGE RENAL DISEASE) ON DIALYSIS: Primary | ICD-10-CM

## 2018-07-12 DIAGNOSIS — Z79.01 ANTICOAGULATED: ICD-10-CM

## 2018-07-12 DIAGNOSIS — I25.10 CORONARY ARTERY DISEASE INVOLVING NATIVE CORONARY ARTERY OF NATIVE HEART WITHOUT ANGINA PECTORIS: ICD-10-CM

## 2018-07-12 PROCEDURE — 99999 PR PBB SHADOW E&M-EST. PATIENT-LVL III: CPT | Mod: PBBFAC,,, | Performed by: SURGERY

## 2018-07-12 PROCEDURE — 99213 OFFICE O/P EST LOW 20 MIN: CPT | Mod: PBBFAC,PN | Performed by: SURGERY

## 2018-07-12 PROCEDURE — 99214 OFFICE O/P EST MOD 30 MIN: CPT | Mod: S$PBB,,, | Performed by: SURGERY

## 2018-07-12 NOTE — PROGRESS NOTES
Subjective:       Patient ID: Jose Maria Jr. is a 86 y.o. male.    Chief Complaint: Post-op Evaluation    HPI   Patient presents for follow-up status post removal left chest PermCath.  No new complaints.  Feels well.  Presents alone, drove himself today.  He is ambulating with a walker.. He recently had several skin lesions treated by Dermatology.    Review of Systems    All systems were reviewed and are negative, except that mentioned in the HPI.    Objective:      Physical Exam   Constitutional: He is oriented to person, place, and time. He appears well-developed and well-nourished. No distress.   HENT:   Head: Normocephalic and atraumatic.   Eyes: Conjunctivae and EOM are normal. Pupils are equal, round, and reactive to light. No scleral icterus.   Neck: Normal range of motion. Neck supple. No JVD present.   Cardiovascular: Regular rhythm.    Pulmonary/Chest: Effort normal and breath sounds normal. No respiratory distress.   Abdominal: Soft. He exhibits no distension.   Musculoskeletal: Normal range of motion. He exhibits no edema or tenderness.   Neurological: He is alert and oriented to person, place, and time. No cranial nerve deficit.   Skin: Skin is warm and dry. He is not diaphoretic.   Well healed left chest wound, dressing was removed, minimal resolving ecchymosis noted, nontender.   Psychiatric: He has a normal mood and affect.       Assessment:       1. ESRD (end stage renal disease) on dialysis    2. Coronary artery disease involving native coronary artery of native heart without angina pectoris    3. Anticoagulated    4. Encounter for central line care        Plan:       Patient has healed well.  He will continue was current medical regimen.  All of his questions were answered.  No dressing is needed.  He will follow up with us as needed.

## 2018-07-12 NOTE — PROCEDURES
"    Post Cath Note  Referring Physician: Dylan Bedolla MD  Procedure: ANGIOGRAM-CORONARY (N/A)       Access: Right CFA  LVEDP 30 mmHg, LVEF preserved  See full report for further details    Intervention:   Diagnostic left heart catheterization  Closure device: Manual pressure    Post Cath Exam:   BP (!) 120/56 (Patient Position: Lying)   Pulse 64   Temp 98.6 °F (37 °C) (Oral)   Resp 18   Ht 5' 9" (1.753 m)   Wt 76.1 kg (167 lb 12.3 oz)   SpO2 95%   BMI 24.78 kg/m²   No unusual pain, hematoma, thrill or bruit at vascular access site.  Distal pulse present without signs of ischemia.    Recommendations:   - Routine post-cath care  - IVF at 1.5 cc/kg/hr for 4 hrs  - Cardiac rehab referral  - Continue maximal medical management, can add amlodipine 5 for further anti-anginal effect    Signed:  Russell Pennington MD  Cardiology Fellow  Pager [phone]          "
State Vehicle/PCA

## 2018-07-31 ENCOUNTER — HOSPITAL ENCOUNTER (INPATIENT)
Facility: HOSPITAL | Age: 83
LOS: 1 days | Discharge: HOME | DRG: 280 | End: 2018-08-01
Attending: INTERNAL MEDICINE | Admitting: INTERNAL MEDICINE
Payer: MEDICARE

## 2018-07-31 ENCOUNTER — SURGERY (OUTPATIENT)
Age: 83
End: 2018-07-31

## 2018-07-31 DIAGNOSIS — I21.4 NSTEMI (NON-ST ELEVATED MYOCARDIAL INFARCTION): ICD-10-CM

## 2018-07-31 DIAGNOSIS — N18.6 END STAGE RENAL DISEASE: Primary | ICD-10-CM

## 2018-07-31 DIAGNOSIS — I25.709 CORONARY ARTERY DISEASE INVOLVING CORONARY BYPASS GRAFT OF NATIVE HEART WITH ANGINA PECTORIS: ICD-10-CM

## 2018-07-31 DIAGNOSIS — I20.0 UNSTABLE ANGINA: ICD-10-CM

## 2018-07-31 PROBLEM — Z95.1 HX OF CABG: Status: ACTIVE | Noted: 2018-07-31

## 2018-07-31 PROBLEM — Z95.5 HISTORY OF CORONARY ARTERY STENT PLACEMENT: Status: ACTIVE | Noted: 2018-07-31

## 2018-07-31 LAB
ALBUMIN SERPL BCP-MCNC: 3.7 G/DL
ALP SERPL-CCNC: 43 U/L
ALT SERPL W/O P-5'-P-CCNC: 21 U/L
ANION GAP SERPL CALC-SCNC: 16 MMOL/L
AST SERPL-CCNC: 73 U/L
BASOPHILS # BLD AUTO: 0 K/UL
BASOPHILS NFR BLD: 0 %
BILIRUB SERPL-MCNC: 0.5 MG/DL
BUN SERPL-MCNC: 62 MG/DL
CALCIUM SERPL-MCNC: 9.9 MG/DL
CHLORIDE SERPL-SCNC: 100 MMOL/L
CHOLEST SERPL-MCNC: 166 MG/DL
CHOLEST/HDLC SERPL: 4.3 {RATIO}
CO2 SERPL-SCNC: 23 MMOL/L
CORONARY STENOSIS: ABNORMAL
CREAT SERPL-MCNC: 6.9 MG/DL
CRP SERPL-MCNC: 2 MG/L
DIASTOLIC DYSFUNCTION: NO
DIFFERENTIAL METHOD: ABNORMAL
EOSINOPHIL # BLD AUTO: 0 K/UL
EOSINOPHIL NFR BLD: 0 %
ERYTHROCYTE [DISTWIDTH] IN BLOOD BY AUTOMATED COUNT: 14.3 %
ERYTHROCYTE [SEDIMENTATION RATE] IN BLOOD BY WESTERGREN METHOD: 40 MM/HR
EST. GFR  (AFRICAN AMERICAN): 8 ML/MIN/1.73 M^2
EST. GFR  (NON AFRICAN AMERICAN): 7 ML/MIN/1.73 M^2
ESTIMATED PA SYSTOLIC PRESSURE: 60.15
GLUCOSE SERPL-MCNC: 167 MG/DL
HCT VFR BLD AUTO: 32.4 %
HDLC SERPL-MCNC: 39 MG/DL
HDLC SERPL: 23.5 %
HGB BLD-MCNC: 10.2 G/DL
LDLC SERPL CALC-MCNC: 76 MG/DL
LYMPHOCYTES # BLD AUTO: 1.1 K/UL
LYMPHOCYTES NFR BLD: 10.8 %
MCH RBC QN AUTO: 32.5 PG
MCHC RBC AUTO-ENTMCNC: 31.5 G/DL
MCV RBC AUTO: 103 FL
MITRAL VALVE MOBILITY: NORMAL
MITRAL VALVE REGURGITATION: ABNORMAL
MONOCYTES # BLD AUTO: 0.2 K/UL
MONOCYTES NFR BLD: 2.1 %
NEUTROPHILS # BLD AUTO: 8.5 K/UL
NEUTROPHILS NFR BLD: 86.9 %
NONHDLC SERPL-MCNC: 127 MG/DL
PLATELET # BLD AUTO: 190 K/UL
PMV BLD AUTO: 10.2 FL
POTASSIUM SERPL-SCNC: 4.2 MMOL/L
PROT SERPL-MCNC: 7.1 G/DL
RBC # BLD AUTO: 3.14 M/UL
RETIRED EF AND QEF - SEE NOTES: 55 (ref 55–65)
SODIUM SERPL-SCNC: 139 MMOL/L
TRICUSPID VALVE REGURGITATION: ABNORMAL
TRIGL SERPL-MCNC: 255 MG/DL
TROPONIN I SERPL DL<=0.01 NG/ML-MCNC: 11.95 NG/ML
TROPONIN I SERPL DL<=0.01 NG/ML-MCNC: 19.67 NG/ML
TROPONIN I SERPL DL<=0.01 NG/ML-MCNC: 30.19 NG/ML
WBC # BLD AUTO: 9.83 K/UL

## 2018-07-31 PROCEDURE — 93455 CORONARY ART/GRFT ANGIO S&I: CPT | Mod: 26,,, | Performed by: INTERNAL MEDICINE

## 2018-07-31 PROCEDURE — B2131ZZ FLUOROSCOPY OF MULTIPLE CORONARY ARTERY BYPASS GRAFTS USING LOW OSMOLAR CONTRAST: ICD-10-PCS | Performed by: INTERNAL MEDICINE

## 2018-07-31 PROCEDURE — 63600175 PHARM REV CODE 636 W HCPCS: Performed by: INTERNAL MEDICINE

## 2018-07-31 PROCEDURE — 93306 TTE W/DOPPLER COMPLETE: CPT | Mod: 26,,, | Performed by: INTERNAL MEDICINE

## 2018-07-31 PROCEDURE — 25500020 PHARM REV CODE 255

## 2018-07-31 PROCEDURE — 93010 ELECTROCARDIOGRAM REPORT: CPT | Mod: ,,, | Performed by: INTERNAL MEDICINE

## 2018-07-31 PROCEDURE — 86140 C-REACTIVE PROTEIN: CPT

## 2018-07-31 PROCEDURE — 12000002 HC ACUTE/MED SURGE SEMI-PRIVATE ROOM

## 2018-07-31 PROCEDURE — 63600175 PHARM REV CODE 636 W HCPCS

## 2018-07-31 PROCEDURE — 85025 COMPLETE CBC W/AUTO DIFF WBC: CPT

## 2018-07-31 PROCEDURE — 80100016 HC MAINTENANCE HEMODIALYSIS

## 2018-07-31 PROCEDURE — 99152 MOD SED SAME PHYS/QHP 5/>YRS: CPT | Mod: ,,, | Performed by: INTERNAL MEDICINE

## 2018-07-31 PROCEDURE — 80061 LIPID PANEL: CPT

## 2018-07-31 PROCEDURE — 84484 ASSAY OF TROPONIN QUANT: CPT | Mod: 91

## 2018-07-31 PROCEDURE — 93306 TTE W/DOPPLER COMPLETE: CPT

## 2018-07-31 PROCEDURE — 25000003 PHARM REV CODE 250

## 2018-07-31 PROCEDURE — 25000003 PHARM REV CODE 250: Performed by: INTERNAL MEDICINE

## 2018-07-31 PROCEDURE — 27200085 CATH LAB PROCEDURE

## 2018-07-31 PROCEDURE — 80053 COMPREHEN METABOLIC PANEL: CPT

## 2018-07-31 PROCEDURE — 94761 N-INVAS EAR/PLS OXIMETRY MLT: CPT

## 2018-07-31 PROCEDURE — 85652 RBC SED RATE AUTOMATED: CPT

## 2018-07-31 PROCEDURE — 93005 ELECTROCARDIOGRAM TRACING: CPT

## 2018-07-31 PROCEDURE — 36415 COLL VENOUS BLD VENIPUNCTURE: CPT

## 2018-07-31 PROCEDURE — B2111ZZ FLUOROSCOPY OF MULTIPLE CORONARY ARTERIES USING LOW OSMOLAR CONTRAST: ICD-10-PCS | Performed by: INTERNAL MEDICINE

## 2018-07-31 RX ORDER — NAPROXEN SODIUM 220 MG/1
81 TABLET, FILM COATED ORAL DAILY
Status: DISCONTINUED | OUTPATIENT
Start: 2018-07-31 | End: 2018-08-01 | Stop reason: HOSPADM

## 2018-07-31 RX ORDER — ONDANSETRON 2 MG/ML
4 INJECTION INTRAMUSCULAR; INTRAVENOUS EVERY 12 HOURS PRN
Status: DISCONTINUED | OUTPATIENT
Start: 2018-07-31 | End: 2018-08-01 | Stop reason: HOSPADM

## 2018-07-31 RX ORDER — ACETAMINOPHEN 325 MG/1
650 TABLET ORAL EVERY 4 HOURS PRN
Status: DISCONTINUED | OUTPATIENT
Start: 2018-07-31 | End: 2018-08-01 | Stop reason: HOSPADM

## 2018-07-31 RX ORDER — NITROGLYCERIN 0.4 MG/1
0.4 TABLET SUBLINGUAL EVERY 5 MIN PRN
Status: DISCONTINUED | OUTPATIENT
Start: 2018-07-31 | End: 2018-08-01 | Stop reason: HOSPADM

## 2018-07-31 RX ORDER — SODIUM CHLORIDE 9 MG/ML
INJECTION, SOLUTION INTRAVENOUS
Status: DISCONTINUED | OUTPATIENT
Start: 2018-07-31 | End: 2018-08-01 | Stop reason: HOSPADM

## 2018-07-31 RX ORDER — ALLOPURINOL 100 MG/1
100 TABLET ORAL DAILY
Status: DISCONTINUED | OUTPATIENT
Start: 2018-07-31 | End: 2018-08-01 | Stop reason: HOSPADM

## 2018-07-31 RX ORDER — MIDAZOLAM HYDROCHLORIDE 1 MG/ML
2 INJECTION INTRAMUSCULAR; INTRAVENOUS ONCE
Status: DISCONTINUED | OUTPATIENT
Start: 2018-07-31 | End: 2018-07-31

## 2018-07-31 RX ORDER — FINASTERIDE 5 MG/1
5 TABLET, FILM COATED ORAL DAILY
Status: DISCONTINUED | OUTPATIENT
Start: 2018-07-31 | End: 2018-08-01 | Stop reason: HOSPADM

## 2018-07-31 RX ORDER — TAMSULOSIN HYDROCHLORIDE 0.4 MG/1
0.4 CAPSULE ORAL DAILY
Status: DISCONTINUED | OUTPATIENT
Start: 2018-07-31 | End: 2018-08-01 | Stop reason: HOSPADM

## 2018-07-31 RX ORDER — SODIUM CHLORIDE 9 MG/ML
INJECTION, SOLUTION INTRAVENOUS ONCE
Status: DISCONTINUED | OUTPATIENT
Start: 2018-07-31 | End: 2018-08-01 | Stop reason: HOSPADM

## 2018-07-31 RX ORDER — SODIUM CHLORIDE 0.9 % (FLUSH) 0.9 %
3 SYRINGE (ML) INJECTION
Status: DISCONTINUED | OUTPATIENT
Start: 2018-07-31 | End: 2018-08-01 | Stop reason: HOSPADM

## 2018-07-31 RX ORDER — MORPHINE SULFATE 4 MG/ML
2 INJECTION, SOLUTION INTRAMUSCULAR; INTRAVENOUS
Status: DISCONTINUED | OUTPATIENT
Start: 2018-07-31 | End: 2018-08-01 | Stop reason: HOSPADM

## 2018-07-31 RX ORDER — ROSUVASTATIN CALCIUM 10 MG/1
40 TABLET, COATED ORAL DAILY
Status: DISCONTINUED | OUTPATIENT
Start: 2018-07-31 | End: 2018-08-01 | Stop reason: HOSPADM

## 2018-07-31 RX ORDER — PANTOPRAZOLE SODIUM 40 MG/1
40 TABLET, DELAYED RELEASE ORAL DAILY
Status: DISCONTINUED | OUTPATIENT
Start: 2018-07-31 | End: 2018-08-01 | Stop reason: HOSPADM

## 2018-07-31 RX ORDER — LEVOTHYROXINE SODIUM 25 UG/1
25 TABLET ORAL DAILY
Status: DISCONTINUED | OUTPATIENT
Start: 2018-07-31 | End: 2018-08-01 | Stop reason: HOSPADM

## 2018-07-31 RX ADMIN — ALLOPURINOL 100 MG: 100 TABLET ORAL at 08:07

## 2018-07-31 RX ADMIN — TICAGRELOR 90 MG: 90 TABLET ORAL at 09:07

## 2018-07-31 RX ADMIN — PANTOPRAZOLE SODIUM 40 MG: 40 TABLET, DELAYED RELEASE ORAL at 08:07

## 2018-07-31 RX ADMIN — ROSUVASTATIN 40 MG: 10 TABLET, FILM COATED ORAL at 08:07

## 2018-07-31 RX ADMIN — TAMSULOSIN HYDROCHLORIDE 0.4 MG: 0.4 CAPSULE ORAL at 08:07

## 2018-07-31 RX ADMIN — Medication 1 CAPSULE: at 09:07

## 2018-07-31 RX ADMIN — LORAZEPAM 1 MG: 2 INJECTION INTRAMUSCULAR at 11:07

## 2018-07-31 RX ADMIN — LEVOTHYROXINE SODIUM 25 MCG: 25 TABLET ORAL at 08:07

## 2018-07-31 RX ADMIN — FINASTERIDE 5 MG: 5 TABLET, FILM COATED ORAL at 08:07

## 2018-07-31 RX ADMIN — METOPROLOL SUCCINATE 12.5 MG: 25 TABLET, EXTENDED RELEASE ORAL at 08:07

## 2018-07-31 RX ADMIN — TRAZODONE HYDROCHLORIDE 25 MG: 50 TABLET ORAL at 01:07

## 2018-07-31 RX ADMIN — ASPIRIN 81 MG 81 MG: 81 TABLET ORAL at 08:07

## 2018-07-31 RX ADMIN — TICAGRELOR 90 MG: 90 TABLET ORAL at 08:07

## 2018-07-31 NOTE — PLAN OF CARE
Patient transferred to recovery cath lab slot 4 via stretcher with side rails up x2 .  Pt drowsy but able to follow commands.  Pt is stable when connecting to cardiac monitors.  VSS.  Right groin site with perclose, gauze and tegaderm in place is CDI no drainage or shadowing noted. No redness, bruising, or hematoma noted around site. Dopplered sierra pedal pulses. Palpated +2 sierra radial pulses.  Skin is normal in color, warm to touch, < 3 sec cap refill.  Right upper arm fistula + bruit +thrill. Small left ear lac noted o/a post cath. No active bleeding noted.  Fall risk precautions given and patient acknowledges.  AIDET completed to pt.  Will continue to monitor patient.    Updated pt's family in sx waiting room.

## 2018-07-31 NOTE — PLAN OF CARE
Problem: Patient Care Overview  Goal: Plan of Care Review  Outcome: Ongoing (interventions implemented as appropriate)  Pt on RA with documented sats.94%. Will continue to monitor.

## 2018-07-31 NOTE — PLAN OF CARE
"Problem: Patient Care Overview  Goal: Plan of Care Review  Outcome: Revised  Plan of care reviewed with patient. Patient verbalized complete understanding. Fall/safety precautions maintained. Slip resistant socks on. Bed in lowest position, locked, call light within reach. Bed alarm on, Side rails up x's 2. Nurse instructed patient to notify staff for any assistance and pt verbalized complete understanding. Pt sleeping throughout care. Pt states he has epigastric abdominal pain that is "relieved with belching." NPO status maintained. Troponin's elevated, will monitor A.M. Troponin. PRN trazodone administered. No acute distress noted, will continue to monitor. Pt on telemetry, no ectopy or true red alarms noted, SR w/ST depression, HR 80's         "

## 2018-07-31 NOTE — PROCEDURES
"Post Procedure Note: [facility]    The pt was brought to the cath lab and under sterile technique, RCFA access was obtained without difficulty. Images were obtained in multiple views and severe native disease with patent grafts x 3 and patent stents. When compared to [facility] 2017, no significant change. No V gram due to severely elevated EDP. Please see full report for details. The pt tolerated the procedure well without complications. Perclose closure device used with successful hemostasis.     Vitals:    07/31/18 0733 07/31/18 0748 07/31/18 0800 07/31/18 0900   BP:  (!) 155/74     BP Location:       Patient Position:  Sitting     Pulse: 90 94 98    Resp:  (!) 24     Temp:  96.1 °F (35.6 °C)     TempSrc:  Oral     SpO2:  (!) 94%  (!) 94%   Weight: 77.3 kg (170 lb 6.7 oz)      Height: 5' 10" (1.778 m)            Gen: NAD  Ext: 2+ fem pulses, no evidence of hematoma    Plan:  -Post cath care per protocol  -Cont ASA/Brilinta  -Check CTA PE protocol  -Check ESR/CRP  "

## 2018-07-31 NOTE — H&P
[facility]  Cardiology  History and Physical     Patient Name: Jose Maria Jr.  MRN: [id number]  Admission Date: 7/31/2018  Code Status: Full Code   Attending Provider: Valerio Bloom MD   Primary Care Physician: Billie Corona MD  Principal Problem: NSTEMI    Patient information was obtained from patient and ER records.     Subjective:     Chief Complaint:  Abdominal pain    HPI:   85 y/o male with hx of CAD s/p CABG with subsequent PCI, HFpEF (last 2DE with EF50-55%), mild to mod AS, mild to mod AI, severe MR, HTN, HLD, hypothyroidism, ESRD on HD who presented with UA. Had elevated trop and last level >11. Has been complaining of epigastric fullness and burping. Has chronic GAMEZ, unchanged. Denies orthopnea, PND, syncope. Compliant with meds. [employer]. Follows with Dr Gorman.     Last PCI with PCI to SVG-OM1 for ISR (2.75 x 15 JIL), SVG-RCA (3.5 x 15, post dilated with 4.0), PCI to pPLB (2.25 x 14), oPDA 2.25 x 12 in Oct 2017.    Past Medical History:   Diagnosis Date    Anticoagulant long-term use     Arthritis     Basal cell carcinoma     skin    BPH (benign prostatic hyperplasia)     CHF (congestive heart failure)     resolved    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 4/7/2016    Coronary artery disease     Encounter for blood transfusion     ESRD (end stage renal disease) on dialysis     3times a week for 3 hrs    Gout     High cholesterol     Hypertension     resolved    Persistent proteinuria 2/5/2017    Renal cyst 2/5/2017    Thyroid disease     Urinary tract infection        Past Surgical History:   Procedure Laterality Date    aaa bypass  1991    AV FISTULA PLACEMENT      left, nonfunctional to right arm    CORONARY ANGIOPLASTY WITH STENT PLACEMENT  2017    4 stents    EYE SURGERY      cataract    GALLBLADDER SURGERY      KNEE SURGERY Left     MEDIPORT REMOVAL Left 6/25/2018    Procedure: REMOVAL, CATHETER, CENTRAL VENOUS, TUNNELED, WITH PORT [8750] ;   Surgeon: Avelina Grace DO;  Location: [facility] OR;  Service: General;  Laterality: Left;  REMOVAL TUNNELED VASCULAR CATHETER LEFT (permacath)       Review of patient's allergies indicates:   Allergen Reactions    Benadryl [diphenhydramine hcl] Rash    Versed [midazolam] Rash       Current Facility-Administered Medications on File Prior to Encounter   Medication    [COMPLETED] aspirin tablet 325 mg    [COMPLETED] GI cocktail (mylanta 30 mL, lidocaine 2 % viscous 10 mL, dicyclomine 10 mL) 50 mL    [COMPLETED] metoprolol injection 2.5 mg    [COMPLETED] nitroGLYCERIN SL tablet 0.4 mg     Current Outpatient Prescriptions on File Prior to Encounter   Medication Sig    ACCU-CHEK TANIKA PLUS TEST STRP Strp once daily.     allopurinol (ZYLOPRIM) 100 MG tablet Take 1 tablet (100 mg total) by mouth once daily.    aspirin 81 MG Chew Take 81 mg by mouth once daily.    B complex-vitamin C-folic acid 0.8 mg Tab Take 1 tablet by mouth once daily.    finasteride (PROSCAR) 5 mg tablet Take 1 tablet (5 mg total) by mouth once daily.    furosemide (LASIX) 80 MG tablet Take 1 tablet (80 mg total) by mouth 2 (two) times daily.    levothyroxine (SYNTHROID) 25 MCG tablet Take 1 tablet (25 mcg total) by mouth once daily.    lidocaine-prilocaine (EMLA) cream Apply topically every Tues, Thurs, Sat.    nitroGLYCERIN (NITROSTAT) 0.4 MG SL tablet Place 1 tablet (0.4 mg total) under the tongue every 5 (five) minutes as needed for Chest pain.    omega-3 acid ethyl esters (LOVAZA) 1 gram capsule Take 1 capsule (1 g total) by mouth 2 (two) times daily.    omeprazole (PRILOSEC) 20 MG capsule Take 20 mg by mouth once daily.     oxyCODONE-acetaminophen (PERCOCET) 5-325 mg per tablet     rosuvastatin (CRESTOR) 10 MG tablet Take 1 tablet (10 mg total) by mouth once daily. (Patient taking differently: Take 40 mg by mouth once daily. )    rosuvastatin (CRESTOR) 40 MG Tab     tamsulosin (FLOMAX) 0.4 mg Cp24 Take 1 capsule (0.4 mg  total) by mouth once daily.    temazepam (RESTORIL) 30 mg capsule Take 30 mg by mouth nightly as needed.     ticagrelor (BRILINTA) 90 mg tablet Take 1 tablet (90 mg total) by mouth 2 (two) times daily.    traMADol (ULTRAM) 50 mg tablet      Family History     None        Social History Main Topics    Smoking status: Never Smoker    Smokeless tobacco: Never Used    Alcohol use No    Drug use: No    Sexual activity: Not Currently     Review of Systems   Constitution: Negative for weakness and malaise/fatigue.   HENT: Negative for congestion.    Eyes: Negative for blurred vision.   Cardiovascular: Positive for chest pain and dyspnea on exertion. Negative for claudication, cyanosis, irregular heartbeat, leg swelling, near-syncope, orthopnea, palpitations, paroxysmal nocturnal dyspnea and syncope.   Respiratory: Negative for shortness of breath.    Endocrine: Negative for polyuria.   Hematologic/Lymphatic: Negative for bleeding problem.   Skin: Negative for itching and rash.   Musculoskeletal: Negative for joint swelling, muscle cramps and muscle weakness.   Gastrointestinal: Negative for abdominal pain, hematemesis, hematochezia, melena, nausea and vomiting.   Genitourinary: Negative for dysuria and hematuria.   Neurological: Negative for dizziness, focal weakness, headaches, light-headedness and loss of balance.   Psychiatric/Behavioral: Negative for depression. The patient is not nervous/anxious.      Objective:     Vital Signs (Most Recent):  Temp: 96.1 °F (35.6 °C) (07/31/18 0748)  Pulse: 94 (07/31/18 0748)  Resp: (!) 24 (07/31/18 0748)  BP: (!) 155/74 (07/31/18 0748)  SpO2: (!) 94 % (07/31/18 0900) Vital Signs (24h Range):  Temp:  [94.5 °F (34.7 °C)-99 °F (37.2 °C)] 96.1 °F (35.6 °C)  Pulse:  [] 94  Resp:  [18-24] 24  SpO2:  [94 %-98 %] 94 %  BP: (139-165)/(68-83) 155/74     Weight: 77.5 kg (170 lb 13.7 oz)  Body mass index is 24.52 kg/m².    SpO2: (!) 94 %  O2 Device (Oxygen Therapy): room air    No  intake or output data in the 24 hours ending 07/31/18 1045    Lines/Drains/Airways     Drain                 Hemodialysis AV Fistula Left forearm -- days         Hemodialysis AV Fistula Right forearm -- days          Peripheral Intravenous Line                 Peripheral IV - Single Lumen 07/30/18 2254 Right Forearm less than 1 day                Physical Exam   Constitutional: He is oriented to person, place, and time. He appears well-developed and well-nourished.   HENT:   Head: Normocephalic and atraumatic.   Neck: Neck supple. No JVD present.   Cardiovascular: Normal rate and regular rhythm.    Murmur heard.   Systolic murmur is present with a grade of 2/6   Pulses:       Carotid pulses are 2+ on the right side, and 2+ on the left side.       Radial pulses are 2+ on the right side, and 2+ on the left side.        Femoral pulses are 2+ on the right side, and 2+ on the left side.       Posterior tibial pulses are 1+ on the right side, and 1+ on the left side.   Pulmonary/Chest: Effort normal and breath sounds normal.   Abdominal: Soft. Bowel sounds are normal.   Musculoskeletal: He exhibits no edema.   Neurological: He is alert and oriented to person, place, and time.   Skin: Skin is warm and dry.   Psychiatric: He has a normal mood and affect. His behavior is normal. Thought content normal.       Significant Labs:   BMP:   Recent Labs  Lab 07/30/18 2248 07/31/18  0529   * 167*    139   K 3.8 4.2    100   CO2 17* 23   BUN 54* 62*   CREATININE 6.85* 6.9*   CALCIUM 9.6 9.9   , CMP   Recent Labs  Lab 07/30/18 2248 07/31/18  0529    139   K 3.8 4.2    100   CO2 17* 23   * 167*   BUN 54* 62*   CREATININE 6.85* 6.9*   CALCIUM 9.6 9.9   PROT 7.6 7.1   ALBUMIN 4.8 3.7   BILITOT 0.4 0.5   ALKPHOS 67 43*   AST 38 73*   ALT 18 21   ANIONGAP 25* 16   ESTGFRAFRICA 7.7* 8*   EGFRNONAA 6.6* 7*   , CBC   Recent Labs  Lab 07/30/18 2248 07/31/18  0529   WBC 6.22 9.83   HGB 10.3* 10.2*   HCT  33.4* 32.4*    190   , INR No results for input(s): INR, PROTIME in the last 48 hours., Lipid Panel   Recent Labs  Lab 07/31/18  0801   CHOL 166   HDL 39*   LDLCALC 76.0   TRIG 255*   CHOLHDL 23.5    and Troponin   Recent Labs  Lab 07/30/18  2248 07/31/18  0529 07/31/18  0801   TROPONINI 0.086* 11.950* 19.672*         Assessment and Plan:     Active Diagnoses:    Diagnosis Date Noted POA    PRINCIPAL PROBLEM:  NSTEMI (non-ST elevated myocardial infarction) [I21.4] 04/07/2016 Yes    Unstable angina [I20.0] 07/31/2018 Yes    Hx of CABG [Z95.1] 07/31/2018 Not Applicable    History of coronary artery stent placement [Z95.5] 07/31/2018 Not Applicable    History of MI (myocardial infarction) [I25.2] 06/22/2018 Not Applicable    End stage renal disease [N18.6] 04/25/2018 Yes    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 Heparin gtt. Plan for [facility] +- intervention    -RCFA access with 6 Fr sheath   -JIL candidate   --The procedure was discussed with the pt along with the risks/benefits and the pt voiced understanding. All questions were answered and written consents obtained.             ESRD on HD - consult nephrology, HD after LHC    HTN - restart meds    HLD - cont statin      VTE Risk Mitigation         Ordered     IP VTE HIGH RISK PATIENT  Once      07/31/18 5347          Valerio Bloom MD  Cardiology   [facility]

## 2018-07-31 NOTE — PLAN OF CARE
Accompanied by spouse.  HD patient with Dr. Fung on TTS at [facility] in [place].    Patient prefers mid to late morning for appts when necessary and they must be on MWF.    Sees Dr. CLAUDIA Gorman for Cards is being worked up by Dr. Bloom at this time, for [facility] then to HD today.     07/31/18 0932   Discharge Assessment   Assessment Type Discharge Planning Assessment   Confirmed/corrected address and phone number on facesheet? Yes   Assessment information obtained from? Patient   Prior to hospitilization cognitive status: Alert/Oriented   Prior to hospitalization functional status: Independent   Current cognitive status: Alert/Oriented   Current Functional Status: Independent   Lives With spouse   Able to Return to Prior Arrangements yes   Is patient able to care for self after discharge? Yes   Who are your caregiver(s) and their phone number(s)? Yulisa Maria Spouse [phone]     Patient's perception of discharge disposition home or selfcare   Readmission Within The Last 30 Days no previous admission in last 30 days   Patient currently being followed by outpatient case management? No   Patient currently receives any other outside agency services? No   Equipment Currently Used at Home rollator;grab bar;walker, rolling;bedside commode;shower chair   Do you have any problems affording any of your prescribed medications? No   Is the patient taking medications as prescribed? yes   Does the patient have transportation home? Yes   Discharge Plan A Home with family   Patient/Family In Agreement With Plan no

## 2018-07-31 NOTE — PLAN OF CARE
14:40-15:00 Patient transferred to floor room 459 via stretcher with sr up x2 on tele monitor.  VSS. No c/o pain.   Patient attached to tele monitor box upon arrival in room.  Right groin gauze/tegaderm site reviewed with YOANA Saab.  CDI, no hematoma or bleeding noted.  Bilat pedal pulses dopplered.  All questions answered. Updated pt's wife at bedside.

## 2018-07-31 NOTE — PLAN OF CARE
Problem: Hemodialysis (Adult)  Goal: Signs and Symptoms of Listed Potential Problems Will be Absent, Minimized or Managed (Hemodialysis)  Signs and symptoms of listed potential problems will be absent, minimized or managed by discharge/transition of care (reference Hemodialysis (Adult) CPG).   Outcome: Ongoing (interventions implemented as appropriate)   07/31/18 1855   Hemodialysis   Problems Assessed (Hemodialysis) all   Problems Present (Hemodialysis) electrolyte imbalance;fluid imbalance   HD with Uf x 2.5h today. POC reviewed with pt.

## 2018-07-31 NOTE — NURSING
"MD Wolf notified of pt elevated troponin results in A.M. Pt states he "feels good." No new orders received. No distress noted. Report given to YOANA Moyer, all questions answered.   "

## 2018-07-31 NOTE — PLAN OF CARE
Problem: Patient Care Overview  Goal: Plan of Care Review  Outcome: Ongoing (interventions implemented as appropriate)  Plan of care reviewed with patient. Patient verbalized complete understanding. Fall precautions maintained. Bed in lowest position, locked, call light within reach, and bed alarm on. Side rails up x2 with slip resistant socks on. Nurse instructed patient to notify staff for any assistance and patient verbalized complete understanding. Patient on telemetry throughout shift with no ectopy noted. will continue to monitor

## 2018-07-31 NOTE — CONSULTS
NEPHROLOGY CONSULT NOTE    HPI & INTERVAL HISTORY:    Past Medical History:   Diagnosis Date    Anticoagulant long-term use     Arthritis     Basal cell carcinoma     skin    BPH (benign prostatic hyperplasia)     CHF (congestive heart failure)     resolved    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 4/7/2016    Coronary artery disease     Encounter for blood transfusion     ESRD (end stage renal disease) on dialysis     3times a week for 3 hrs    Gout     High cholesterol     Hypertension     resolved    Persistent proteinuria 2/5/2017    Renal cyst 2/5/2017    Thyroid disease     Urinary tract infection       Past Surgical History:   Procedure Laterality Date    aaa bypass  1991    AV FISTULA PLACEMENT      left, nonfunctional to right arm    CORONARY ANGIOPLASTY WITH STENT PLACEMENT  2017    4 stents    EYE SURGERY      cataract    GALLBLADDER SURGERY      KNEE SURGERY Left     MEDIPORT REMOVAL Left 6/25/2018    Procedure: REMOVAL, CATHETER, CENTRAL VENOUS, TUNNELED, WITH PORT [3032] ;  Surgeon: Avelina Grace DO;  Location: [facility] OR;  Service: General;  Laterality: Left;  REMOVAL TUNNELED VASCULAR CATHETER LEFT (permacath)      Review of patient's allergies indicates:   Allergen Reactions    Benadryl [diphenhydramine hcl] Rash    Versed [midazolam] Rash      Prescriptions Prior to Admission   Medication Sig Dispense Refill Last Dose    ACCU-CHEK TANIKA PLUS TEST STRP Strp once daily.    Taking    allopurinol (ZYLOPRIM) 100 MG tablet Take 1 tablet (100 mg total) by mouth once daily. 30 tablet 0 Taking    aspirin 81 MG Chew Take 81 mg by mouth once daily.   Taking    B complex-vitamin C-folic acid 0.8 mg Tab Take 1 tablet by mouth once daily. 90 each 3 Taking    finasteride (PROSCAR) 5 mg tablet Take 1 tablet (5 mg total) by mouth once daily. 30 tablet 11 Taking    furosemide (LASIX) 80 MG tablet Take 1 tablet (80 mg total) by mouth 2 (two) times daily. 180 tablet 3 Taking     levothyroxine (SYNTHROID) 25 MCG tablet Take 1 tablet (25 mcg total) by mouth once daily. 90 tablet 0 Taking    lidocaine-prilocaine (EMLA) cream Apply topically every Tues, Thurs, Sat. 40 g 12 Taking    nitroGLYCERIN (NITROSTAT) 0.4 MG SL tablet Place 1 tablet (0.4 mg total) under the tongue every 5 (five) minutes as needed for Chest pain. 25 tablet 1 Taking    omega-3 acid ethyl esters (LOVAZA) 1 gram capsule Take 1 capsule (1 g total) by mouth 2 (two) times daily. 60 capsule 3 Taking    omeprazole (PRILOSEC) 20 MG capsule Take 20 mg by mouth once daily.    Taking    oxyCODONE-acetaminophen (PERCOCET) 5-325 mg per tablet   0 Taking    rosuvastatin (CRESTOR) 10 MG tablet Take 1 tablet (10 mg total) by mouth once daily. (Patient taking differently: Take 40 mg by mouth once daily. ) 30 tablet 11 Taking    rosuvastatin (CRESTOR) 40 MG Tab   3 Taking    tamsulosin (FLOMAX) 0.4 mg Cp24 Take 1 capsule (0.4 mg total) by mouth once daily. 30 capsule 11 Taking    temazepam (RESTORIL) 30 mg capsule Take 30 mg by mouth nightly as needed.    Taking    ticagrelor (BRILINTA) 90 mg tablet Take 1 tablet (90 mg total) by mouth 2 (two) times daily. 60 tablet 11 Taking    traMADol (ULTRAM) 50 mg tablet   0 Taking       Social History     Social History    Marital status:      Spouse name: N/A    Number of children: N/A    Years of education: N/A     Occupational History    Not on file.     Social History Main Topics    Smoking status: Never Smoker    Smokeless tobacco: Never Used    Alcohol use No    Drug use: No    Sexual activity: Not Currently     Other Topics Concern    Not on file     Social History Narrative    No narrative on file        MEDS   allopurinol  100 mg Oral Daily    aspirin  81 mg Oral Daily    finasteride  5 mg Oral Daily    levothyroxine  25 mcg Oral Daily    lorazepam  2 mg Intravenous Once    metoprolol succinate  12.5 mg Oral Daily    omega-3 fatty acids-fish oil  1 capsule  Oral BID    pantoprazole  40 mg Oral Daily    rosuvastatin  40 mg Oral Daily    tamsulosin  0.4 mg Oral Daily    ticagrelor  90 mg Oral BID                  CONTINOUS INFUSIONS:    No intake or output data in the 24 hours ending 07/31/18 1229     HEMODYNAMICS:    Temp:  [94.5 °F (34.7 °C)-99 °F (37.2 °C)] 96.1 °F (35.6 °C)  Pulse:  [] 98  Resp:  [18-24] 24  SpO2:  [94 %-98 %] 94 %  BP: (139-165)/(68-83) 155/74   Admitted with NSTEMI.   Gen: NAD  No SOB, no cough, no CP  No nausea, no vomiting, no diarrhea  Cards: Pulse 86  Pul: diminished breath sounds  Abdomen soft   Ext: trace edema   Skin: dry   Dialysis Access:  Left arm AVF  LABS   Lab Results   Component Value Date    WBC 9.83 07/31/2018    HGB 10.2 (L) 07/31/2018    HCT 32.4 (L) 07/31/2018     (H) 07/31/2018     07/31/2018          Recent Labs  Lab 07/31/18  0529   *   CALCIUM 9.9   ALBUMIN 3.7   PROT 7.1      K 4.2   CO2 23      BUN 62*   CREATININE 6.9*   ALKPHOS 43*   ALT 21   AST 73*   BILITOT 0.5      Lab Results   Component Value Date    .0 (H) 10/05/2017    CALCIUM 9.9 07/31/2018    PHOS 2.2 (L) 10/11/2017      Lab Results   Component Value Date    IRON 36 (L) 09/15/2017    TIBC 275 09/15/2017    FERRITIN 348 (H) 09/15/2017        ABG  No results for input(s): PH, PO2, PCO2, HCO3, BE in the last 168 hours.      IMAGING:  CXR    ASSESSMENT / PLAN    [facility]. Severe native disease with patent grafts x 3 and patent stents.   Echo     1 - Normal left ventricular systolic function (EF 55-60%).     2 - Normal left ventricular diastolic function.     3 - Normal right ventricular systolic function .     4 - Pulmonary hypertension. The estimated PA systolic pressure is 60 mmHg.     5 - Severe left atrial enlargement.     6 - Moderate mitral regurgitation.   IVC: IVC is normal in size and collapses > 50% with a sniff, suggesting normal right atrial pressure of 3 mmHg.     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  There is no pneumothorax or pleural fluid.  The cardiac silhouette is enlarged.  There is calcification of the aorta and patient is status post sternotomy.  The osseous structures demonstrate degenerative change  Weight daily.  Renal diet.  Dialysis.

## 2018-07-31 NOTE — NURSING
Pt arrived to unit. Plan of care reviewed with patient. Patient verbalized complete understanding. Fall/safety precautions maintained. Fall risk contract signed, fall, allergy, limb alert wrist band on, slip resistant socks on. Bed in lowest position, locked, call light within reach. Bed alarm on and side rails up x's 2. Nurse instructed patient to notify staff for any assistance and pt verbalized complete understanding. Pt on telemetry & cardiac monitor is on. Dr. Bloom notified of pt arrival, orders placed per MD. MD notified of current condition, SR w/ST depression. No acute distress noted, will continue to monitor.

## 2018-08-01 ENCOUNTER — TELEPHONE (OUTPATIENT)
Dept: FAMILY MEDICINE | Facility: CLINIC | Age: 83
End: 2018-08-01

## 2018-08-01 VITALS
SYSTOLIC BLOOD PRESSURE: 133 MMHG | HEART RATE: 86 BPM | DIASTOLIC BLOOD PRESSURE: 63 MMHG | TEMPERATURE: 99 F | OXYGEN SATURATION: 97 % | WEIGHT: 170.44 LBS | BODY MASS INDEX: 24.4 KG/M2 | RESPIRATION RATE: 15 BRPM | HEIGHT: 70 IN

## 2018-08-01 LAB
ALBUMIN SERPL BCP-MCNC: 3.4 G/DL
ALP SERPL-CCNC: 40 U/L
ALT SERPL W/O P-5'-P-CCNC: 17 U/L
ANION GAP SERPL CALC-SCNC: 12 MMOL/L
ANION GAP SERPL CALC-SCNC: 12 MMOL/L
AST SERPL-CCNC: 71 U/L
BASOPHILS # BLD AUTO: 0.01 K/UL
BASOPHILS # BLD AUTO: 0.01 K/UL
BASOPHILS NFR BLD: 0.1 %
BASOPHILS NFR BLD: 0.1 %
BILIRUB SERPL-MCNC: 0.6 MG/DL
BUN SERPL-MCNC: 43 MG/DL
BUN SERPL-MCNC: 43 MG/DL
CALCIUM SERPL-MCNC: 9.2 MG/DL
CALCIUM SERPL-MCNC: 9.2 MG/DL
CHLORIDE SERPL-SCNC: 104 MMOL/L
CHLORIDE SERPL-SCNC: 104 MMOL/L
CO2 SERPL-SCNC: 21 MMOL/L
CO2 SERPL-SCNC: 21 MMOL/L
CREAT SERPL-MCNC: 4.9 MG/DL
CREAT SERPL-MCNC: 4.9 MG/DL
DIFFERENTIAL METHOD: ABNORMAL
DIFFERENTIAL METHOD: ABNORMAL
EOSINOPHIL # BLD AUTO: 0 K/UL
EOSINOPHIL # BLD AUTO: 0 K/UL
EOSINOPHIL NFR BLD: 0.1 %
EOSINOPHIL NFR BLD: 0.1 %
ERYTHROCYTE [DISTWIDTH] IN BLOOD BY AUTOMATED COUNT: 14.4 %
ERYTHROCYTE [DISTWIDTH] IN BLOOD BY AUTOMATED COUNT: 14.4 %
EST. GFR  (AFRICAN AMERICAN): 11 ML/MIN/1.73 M^2
EST. GFR  (AFRICAN AMERICAN): 11 ML/MIN/1.73 M^2
EST. GFR  (NON AFRICAN AMERICAN): 10 ML/MIN/1.73 M^2
EST. GFR  (NON AFRICAN AMERICAN): 10 ML/MIN/1.73 M^2
GLUCOSE SERPL-MCNC: 112 MG/DL
GLUCOSE SERPL-MCNC: 112 MG/DL
HCT VFR BLD AUTO: 32.7 %
HCT VFR BLD AUTO: 32.7 %
HGB BLD-MCNC: 10.4 G/DL
HGB BLD-MCNC: 10.4 G/DL
LYMPHOCYTES # BLD AUTO: 1.2 K/UL
LYMPHOCYTES # BLD AUTO: 1.2 K/UL
LYMPHOCYTES NFR BLD: 12 %
LYMPHOCYTES NFR BLD: 12 %
MCH RBC QN AUTO: 32.9 PG
MCH RBC QN AUTO: 32.9 PG
MCHC RBC AUTO-ENTMCNC: 31.8 G/DL
MCHC RBC AUTO-ENTMCNC: 31.8 G/DL
MCV RBC AUTO: 104 FL
MCV RBC AUTO: 104 FL
MONOCYTES # BLD AUTO: 0.5 K/UL
MONOCYTES # BLD AUTO: 0.5 K/UL
MONOCYTES NFR BLD: 5.3 %
MONOCYTES NFR BLD: 5.3 %
NEUTROPHILS # BLD AUTO: 8.4 K/UL
NEUTROPHILS # BLD AUTO: 8.4 K/UL
NEUTROPHILS NFR BLD: 82.3 %
NEUTROPHILS NFR BLD: 82.3 %
PLATELET # BLD AUTO: 191 K/UL
PLATELET # BLD AUTO: 191 K/UL
PMV BLD AUTO: 10.2 FL
PMV BLD AUTO: 10.2 FL
POTASSIUM SERPL-SCNC: 4.2 MMOL/L
POTASSIUM SERPL-SCNC: 4.2 MMOL/L
PROT SERPL-MCNC: 6.7 G/DL
RBC # BLD AUTO: 3.16 M/UL
RBC # BLD AUTO: 3.16 M/UL
SODIUM SERPL-SCNC: 137 MMOL/L
SODIUM SERPL-SCNC: 137 MMOL/L
TROPONIN I SERPL DL<=0.01 NG/ML-MCNC: 26.35 NG/ML
TROPONIN I SERPL DL<=0.01 NG/ML-MCNC: 30.22 NG/ML
WBC # BLD AUTO: 10.17 K/UL
WBC # BLD AUTO: 10.17 K/UL

## 2018-08-01 PROCEDURE — 84484 ASSAY OF TROPONIN QUANT: CPT

## 2018-08-01 PROCEDURE — 93010 ELECTROCARDIOGRAM REPORT: CPT | Mod: ,,, | Performed by: INTERNAL MEDICINE

## 2018-08-01 PROCEDURE — 36415 COLL VENOUS BLD VENIPUNCTURE: CPT

## 2018-08-01 PROCEDURE — 25000003 PHARM REV CODE 250: Performed by: INTERNAL MEDICINE

## 2018-08-01 PROCEDURE — 99239 HOSP IP/OBS DSCHRG MGMT >30: CPT | Mod: ,,, | Performed by: INTERNAL MEDICINE

## 2018-08-01 PROCEDURE — 25500020 PHARM REV CODE 255: Performed by: INTERNAL MEDICINE

## 2018-08-01 PROCEDURE — 93005 ELECTROCARDIOGRAM TRACING: CPT

## 2018-08-01 PROCEDURE — 85025 COMPLETE CBC W/AUTO DIFF WBC: CPT

## 2018-08-01 PROCEDURE — 80053 COMPREHEN METABOLIC PANEL: CPT

## 2018-08-01 PROCEDURE — 94761 N-INVAS EAR/PLS OXIMETRY MLT: CPT

## 2018-08-01 RX ORDER — METOPROLOL SUCCINATE 25 MG/1
12.5 TABLET, EXTENDED RELEASE ORAL DAILY
Qty: 15 TABLET | Refills: 11 | Status: SHIPPED | OUTPATIENT
Start: 2018-08-02 | End: 2019-08-02

## 2018-08-01 RX ADMIN — ASPIRIN 81 MG 81 MG: 81 TABLET ORAL at 09:08

## 2018-08-01 RX ADMIN — PANTOPRAZOLE SODIUM 40 MG: 40 TABLET, DELAYED RELEASE ORAL at 09:08

## 2018-08-01 RX ADMIN — FINASTERIDE 5 MG: 5 TABLET, FILM COATED ORAL at 09:08

## 2018-08-01 RX ADMIN — IOHEXOL 100 ML: 350 INJECTION, SOLUTION INTRAVENOUS at 01:08

## 2018-08-01 RX ADMIN — ALLOPURINOL 100 MG: 100 TABLET ORAL at 09:08

## 2018-08-01 RX ADMIN — TAMSULOSIN HYDROCHLORIDE 0.4 MG: 0.4 CAPSULE ORAL at 09:08

## 2018-08-01 RX ADMIN — Medication 1 CAPSULE: at 10:08

## 2018-08-01 RX ADMIN — TICAGRELOR 90 MG: 90 TABLET ORAL at 09:08

## 2018-08-01 RX ADMIN — ROSUVASTATIN 40 MG: 10 TABLET, FILM COATED ORAL at 09:08

## 2018-08-01 RX ADMIN — LEVOTHYROXINE SODIUM 25 MCG: 25 TABLET ORAL at 09:08

## 2018-08-01 RX ADMIN — METOPROLOL SUCCINATE 12.5 MG: 25 TABLET, EXTENDED RELEASE ORAL at 09:08

## 2018-08-01 NOTE — DISCHARGE SUMMARY
[facility]  Cardiology  Discharge Summary      Patient Name: Jose Maria Jr.  MRN: [id number]  Admission Date: 7/31/2018  Hospital Length of Stay: 1 days  Discharge Date and Time: 8/1/2018  3:33 PM  Attending Physician: No att. providers found  Discharging Provider: ELLIS Aragon, ANP  Primary Care Physician: Billie Corona MD    HPI: 85 y/o male with hx of CAD s/p CABG with subsequent PCI, HFpEF (last 2DE with EF50-55%), mild to mod AS, mild to mod AI, severe MR, HTN, HLD, hypothyroidism, ESRD on HD who presented with UA. Had elevated trop and last level >11. Has been complaining of epigastric fullness and burping. Has chronic GAMEZ, unchanged. Denies orthopnea, PND, syncope. Compliant with meds. [place] [employer] vet. Follows with Dr Gorman    Procedure(s) (LRB):  Left heart cath (N/A)     Indwelling Lines/Drains at time of discharge:  Lines/Drains/Airways     Drain                 Hemodialysis AV Fistula Left forearm -- days         Hemodialysis AV Fistula Right forearm -- days                Hospital Course   7/31/2018 Presented to the ER with complaints of chest pain concerning for USA. Initial troponin .086 with trend up to 11.950. EKG with no acute changes. Admitted to [facility] for USA/NSTEMI for further mangement. Remained chest pain free with continued trend up of troponin to 19.672-30.193-30.217 with no acute EKG changes. Given history, complaints of chest pain and elevated troponin, decision to proceed with [facility] with following results:    Three vessel coronary artery disease.  Diastolic dysfunction.  Patent grafts x 3 (SVG-LAD, SVG-OM, SVG-RCA) with patent stents.  Severe native disease.    Tolerated procedure and recovered on telemetry unit overnight    8/1/2018 Stable overnight with no recurrent chest pain or SOB. Given marked troponin elevation, concerned about possibility of PE therefore underwent CTA for evaluation of PE with no evidence of PE. Able to ambulate without any  complaints of chest pain and deemed ready for discharge. Sent home on current medication regimen with ASA, Brilinta, Crestor and BB. No ACEI/ARB due to marginal BP. Will follow up with Dr. Gorman in 2-3 weeks      Consults:   Consults         Status Ordering Provider     Inpatient consult to [facility] (Xi Lorenzo, Araceli)  Once     Provider:  (Not yet assigned)    YUAN Mcnulty          Significant Diagnostic Studies: Radiology: CTA with no evidence of PE    Cardiac Graphics: Echocardiogram:   2D echo with color flow doppler:   Results for orders placed or performed during the hospital encounter of 07/31/18   2D echo with color flow doppler   Result Value Ref Range    EF 55 55 - 65    Mitral Valve Regurgitation MODERATE (A)     Diastolic Dysfunction No     Est. PA Systolic Pressure 60.15 (A)     Mitral Valve Mobility NORMAL     Tricuspid Valve Regurgitation MILD        Pending Diagnostic Studies:     Procedure Component Value Units Date/Time    Troponin I [[id number]]     Order Status:  Sent Lab Status:  No result     Specimen:  Blood from Blood           Final Active Diagnoses:    Diagnosis Date Noted POA    PRINCIPAL PROBLEM:  NSTEMI (non-ST elevated myocardial infarction) [I21.4] 04/07/2016 Yes    Unstable angina [I20.0] 07/31/2018 Yes    Hx of CABG [Z95.1] 07/31/2018 Not Applicable    History of coronary artery stent placement [Z95.5] 07/31/2018 Not Applicable    History of MI (myocardial infarction) [I25.2] 06/22/2018 Not Applicable    End stage renal disease [N18.6] 04/25/2018 Yes    Stenosis of arteriovenous dialysis fistula [T82.858A] 01/03/2018 Yes    Episode of transient neurologic symptoms [R29.90] 10/10/2017 Yes    Coronary artery disease involving coronary bypass graft of native heart with angina pectoris [I25.709] 09/28/2017 Yes    Chronic systolic heart failure [I50.22] 09/26/2017 Yes    Gastroesophageal reflux disease without esophagitis [K21.9]  09/09/2017 Yes    Acquired hypothyroidism [E03.9] 12/26/2016 Yes    HLD (hyperlipidemia) [E78.5] 04/07/2016 Yes      Problems Resolved During this Admission:    Diagnosis Date Noted Date Resolved POA       Discharged Condition: good    Follow Up:  Follow-up Information     Marcus Gorman MD. Go on 8/10/2018.    Specialties:  INTERVENTIONAL CARDIOLOGY, Cardiology  Why:  @ 10:20am, Fax# [phone] patient sees on [address] in [place], [place]  Contact information:  [address] [facility]  [id number]  [facility] [place] [address]  [phone]             Call Billie Corona MD.    Specialty:  Internal Medicine  Why:  This clinic will call you for your appointment date and time.  Contact information:  [address] [place] [address]  [phone]             appt on mwf only please, dialysis on tts.               Patient Instructions:     Diet Cardiac     Diet renal     Diet Cardiac     Activity as tolerated     Activity as tolerated       Medications:  Reconciled Home Medications:      Medication List      START taking these medications    metoprolol succinate 25 MG 24 hr tablet  Commonly known as:  TOPROL-XL  Take 0.5 tablets (12.5 mg total) by mouth once daily.  Start taking on:  8/2/2018        CONTINUE taking these medications    ACCU-CHEK TANIKA PLUS TEST STRP Strp  Generic drug:  blood sugar diagnostic  once daily.     allopurinol 100 MG tablet  Commonly known as:  ZYLOPRIM  Take 1 tablet (100 mg total) by mouth once daily.     aspirin 81 MG Chew  Take 81 mg by mouth once daily.     B complex-vitamin C-folic acid 0.8 mg Tab  Take 1 tablet by mouth once daily.     finasteride 5 mg tablet  Commonly known as:  PROSCAR  Take 1 tablet (5 mg total) by mouth once daily.     furosemide 80 MG tablet  Commonly known as:  LASIX  Take 1 tablet (80 mg total) by mouth 2 (two) times daily.     levothyroxine 25 MCG tablet  Commonly known as:  SYNTHROID  Take 1 tablet (25 mcg total) by mouth  once daily.     lidocaine-prilocaine cream  Commonly known as:  EMLA  Apply topically every Tues, Thurs, Sat.     nitroGLYCERIN 0.4 MG SL tablet  Commonly known as:  NITROSTAT  Place 1 tablet (0.4 mg total) under the tongue every 5 (five) minutes as needed for Chest pain.     omega-3 acid ethyl esters 1 gram capsule  Commonly known as:  LOVAZA  Take 1 capsule (1 g total) by mouth 2 (two) times daily.     omeprazole 20 MG capsule  Commonly known as:  PRILOSEC  Take 20 mg by mouth once daily.     oxyCODONE-acetaminophen 5-325 mg per tablet  Commonly known as:  PERCOCET     rosuvastatin 40 MG Tab  Commonly known as:  CRESTOR     tamsulosin 0.4 mg Cap  Commonly known as:  FLOMAX  Take 1 capsule (0.4 mg total) by mouth once daily.     temazepam 30 mg capsule  Commonly known as:  RESTORIL  Take 30 mg by mouth nightly as needed.     ticagrelor 90 mg tablet  Commonly known as:  BRILINTA  Take 1 tablet (90 mg total) by mouth 2 (two) times daily.     traMADol 50 mg tablet  Commonly known as:  ULTRAM            Time spent on the discharge of patient: 45 minutes    ELLIS Aragon, ANP  Cardiology  [facility]

## 2018-08-01 NOTE — TELEPHONE ENCOUNTER
----- Message from Lisa Jiménez sent at 8/1/2018  2:26 PM CDT -----  Contact: Arnaldo Parhamner - [phone]  Patient needs a 1 to 2 week Hospital Follow , but there in no appointment that soon. Please advise       Patient requested to see Dr. Corona

## 2018-08-01 NOTE — PROGRESS NOTES
Progress Note  Nephrology      Consult Requested By: Valerio Bloom MD      SUBJECTIVE:     Overnight events  Patient is a 86 y.o. male     Patient Active Problem List   Diagnosis    Coronary artery disease involving native coronary artery of native heart without angina pectoris    NSTEMI (non-ST elevated myocardial infarction)    Essential hypertension    HLD (hyperlipidemia)    ESRD (end stage renal disease) on dialysis    Anemia of chronic renal failure, stage 5    Elevated troponin    Acquired hypothyroidism    Gout    BPH (benign prostatic hyperplasia)    Vitamin B 12 deficiency    Anxiety    Persistent proteinuria    Renal cyst    Secondary hyperparathyroidism    Prediabetes    Hyperphosphatemia    Gastroesophageal reflux disease without esophagitis    Chronic systolic heart failure    Coronary artery disease involving coronary bypass graft of native heart with angina pectoris    Hypoalbuminemia due to protein-calorie malnutrition    Episode of transient neurologic symptoms    Stenosis of arteriovenous dialysis fistula    Dialysis AV fistula malfunction    End stage renal disease    History of MI (myocardial infarction)    Anemia in chronic kidney disease, on chronic dialysis    Anticoagulated    Unstable angina    Hx of CABG    History of coronary artery stent placement     Past Medical History:   Diagnosis Date    Anticoagulant long-term use     Arthritis     Basal cell carcinoma     skin    BPH (benign prostatic hyperplasia)     CHF (congestive heart failure)     resolved    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 4/7/2016    Coronary artery disease     Encounter for blood transfusion     ESRD (end stage renal disease) on dialysis     3times a week for 3 hrs    Gout     High cholesterol     Hypertension     resolved    Persistent proteinuria 2/5/2017    Renal cyst 2/5/2017    Thyroid disease     Urinary tract infection               OBJECTIVE:     Vitals:     08/01/18 0735 08/01/18 0800 08/01/18 0832 08/01/18 1201   BP: 116/62   133/63   BP Location:    Right arm   Patient Position: Lying   Lying   Pulse: 94 73  86   Resp: (!) 22   15   Temp: 98.7 °F (37.1 °C)   98.6 °F (37 °C)   TempSrc: Oral   Oral   SpO2: (!) 90%  97%    Weight:       Height:           Temp: 98.6 °F (37 °C) (08/01/18 1201)  Pulse: 86 (08/01/18 1201)  Resp: 15 (08/01/18 1201)  BP: 133/63 (08/01/18 1201)  SpO2: 97 % (08/01/18 0832)      Date 08/01/18 0700 - 08/02/18 0659   Shift 7419-8048 6370-1738 4811-8537 24 Hour Total   I  N  T  A  K  E   P.O. 305   305    Shift Total  (mL/kg) 305  (3.9)   305  (3.9)   O  U  T  P  U  T   Shift Total  (mL/kg)       Weight (kg) 77.3 77.3 77.3 77.3             Medications:   sodium chloride 0.9%   Intravenous Once    allopurinol  100 mg Oral Daily    aspirin  81 mg Oral Daily    finasteride  5 mg Oral Daily    levothyroxine  25 mcg Oral Daily    metoprolol succinate  12.5 mg Oral Daily    omega-3 fatty acids-fish oil  1 capsule Oral BID    pantoprazole  40 mg Oral Daily    rosuvastatin  40 mg Oral Daily    tamsulosin  0.4 mg Oral Daily    ticagrelor  90 mg Oral BID                 Physical Exam:  General appearance: NAD  No SOB  Lungs: diminished breath sounds  Heart: pulse 86  Abdomen: soft  Extremities: edema  Skin: dry  Laboratory:  ABG  Labs reviewed  Recent Results (from the past 336 hour(s))   Basic metabolic panel    Collection Time: 08/01/18  4:41 AM   Result Value Ref Range    Sodium 137 136 - 145 mmol/L    Potassium 4.2 3.5 - 5.1 mmol/L    Chloride 104 95 - 110 mmol/L    CO2 21 (L) 23 - 29 mmol/L    BUN, Bld 43 (H) 8 - 23 mg/dL    Creatinine 4.9 (H) 0.5 - 1.4 mg/dL    Calcium 9.2 8.7 - 10.5 mg/dL    Anion Gap 12 8 - 16 mmol/L     Recent Results (from the past 336 hour(s))   CBC auto differential    Collection Time: 08/01/18  4:41 AM   Result Value Ref Range    WBC 10.17 3.90 - 12.70 K/uL    Hemoglobin 10.4 (L) 14.0 - 18.0 g/dL    Hematocrit 32.7  (L) 40.0 - 54.0 %    Platelets 191 150 - 350 K/uL   CBC auto differential    Collection Time: 08/01/18  4:41 AM   Result Value Ref Range    WBC 10.17 3.90 - 12.70 K/uL    Hemoglobin 10.4 (L) 14.0 - 18.0 g/dL    Hematocrit 32.7 (L) 40.0 - 54.0 %    Platelets 191 150 - 350 K/uL   CBC auto differential    Collection Time: 07/31/18  5:29 AM   Result Value Ref Range    WBC 9.83 3.90 - 12.70 K/uL    Hemoglobin 10.2 (L) 14.0 - 18.0 g/dL    Hematocrit 32.4 (L) 40.0 - 54.0 %    Platelets 190 150 - 350 K/uL     Urinalysis  No results for input(s): COLORU, CLARITYU, SPECGRAV, PHUR, PROTEINUA, GLUCOSEU, BILIRUBINCON, BLOODU, WBCU, RBCU, BACTERIA, MUCUS, NITRITE, LEUKOCYTESUR, UROBILINOGEN, HYALINECASTS in the last 24 hours.    Diagnostic Results:  X-Ray: Reviewed  US: Reviewed  Echo: Reviewed  ACCESS    ASSESSMENT/PLAN:   ESRD  Metabolic acidosis  Anemia  /63  Dialysis yesterday. Net UF 1000 cc.  CTA  No acute pulmonary embolus identified.  Interstitial pulmonary edema and small right effusion.  Superimposed ground-glass opacification may represent atelectasis, edema, or pneumonitis.  Cardiomegaly.  Diffusely prominent pulmonary arteries, suggesting some degree of pulmonary hypertension.  HD/UF am

## 2018-08-01 NOTE — PLAN OF CARE
Problem: Patient Care Overview  Goal: Plan of Care Review  Outcome: Ongoing (interventions implemented as appropriate)  Patient had  an unevenful night. Nurse went over plan of care with patient, questions encouraged.  Patient denies chest pain or SOB. On continuous heart monitoring, NSR, HR in 80s , no true red alarms . Fall precautions maintained.  Bed locked and low, side rails X3, call bell within reach. Nurse asked patient to call before ambulating, patient verbalized understanding. Will continue monitoring.

## 2018-08-01 NOTE — PLAN OF CARE
PCP will call to set the appointment with the patient as they only have September available at this time.  Patient has opted for pharmacy of choice for his DC meds.      Follow-up With  Details  Why  Contact Info   Marcus Gorman MD  Go on 8/10/2018  @ 10:20am, Fax# [phone] patient sees [place] [address] [place]  [address] [place] [address]  [id number]  [facility] [place] [address]  [phone]   Billie Corona MD  Call  This clinic will call you for your appointment date and time.  [address] [facility] [place] [address]  [phone]   appt on mwf only please, dialysis on tts                08/01/18 1426   Final Note   Assessment Type Final Discharge Note   Discharge Disposition Home   What phone number can be called within the next 1-3 days to see how you are doing after discharge? [phone]   Hospital Follow Up  Appt(s) scheduled? Yes   Discharge plans and expectations educations in teach back method with documentation complete? Yes   Right Care Referral Info   Post Acute Recommendation No Care

## 2018-08-01 NOTE — NURSING
Patient d/c home . D/c instructions given to the patient and family. Verbalized understanding. Education given and reviewed. Refer to clinical reference for education. Iv removed tip intact. Telemetry d/c. Waiting for transport

## 2018-08-06 NOTE — PHYSICIAN QUERY
PT Name: Jose Maria Jr.  MR #: [id number]    Physician Query Form - CardioPulmonary Clarification      CDS/: Beryl Bauman               Contact information: [email or web address]      This form is a permanent document in the medical record.    Query Date: August 6, 2018    By submitting this query, we are merely seeking further clarification of documentation. Please utilize your independent clinical judgment when addressing the question(s) below.    The Medical record contains the following:   Indicators   Supporting Clinical Findings Location in Medical Record   x Pulmonary Hypertension documented   4 - Pulmonary hypertension. The estimated PA systolic pressure is 60 mmH 2 D Echo    x Acute/Chronic Illness HFpEF (last 2DE with EF50-55%),  Discharge summary    x Echo and/or Heart Cath Findings [facility]. Severe native disease with patent grafts x 3 and patent stents.   Echo     1 - Normal left ventricular systolic function (EF 55-60%).     2 - Normal left ventricular diastolic function.     3 - Normal right ventricular systolic function .     4 - Pulmonary hypertension. The estimated PA systolic pressure is 60 mmHg.     5 - Severe left atrial enlargement.     6 - Moderate mitral regurgitation.   IVC: IVC is normal in size and collapses > 50% with a sniff, suggesting normal right atrial pressure of 3 mmHg.  Consult note     BiPAP/Intubation/Supplemental O2      SOB, GAMEZ, Fatigue, Dizziness, LE Edema, Cyanosis, Chest Pain, Respiratory Distress, Hypoxia, etc.      Treatment         Medication      Other     Provider, please specify the type of pulmonary hypertension:    [   ]  Group 2:  Pulmonary Hypertension due to Left Heart Disease, including left heart failure and/or left heart valve disease    [   ]  Group 5:  Pumonary Hypertension due to other, multifactorial, or unclear mechanisms    [  x ]  Pulmonary Hypertension, unspecified    [   ]  Other Cardiopulmonary Condition (please specify):   _____________________________________    [   ]  Clinically Undetermined    Please document in your progress notes daily for the duration of treatment, until resolved, and include in your discharge summary.

## 2018-08-13 ENCOUNTER — TELEPHONE (OUTPATIENT)
Dept: CARDIOLOGY | Facility: CLINIC | Age: 83
End: 2018-08-13

## 2018-08-13 NOTE — TELEPHONE ENCOUNTER
Rosie left voice message for Mr Maria to schedule an appt to see a Cardiologist here at [facility]..

## 2018-08-14 ENCOUNTER — TELEPHONE (OUTPATIENT)
Dept: CARDIOLOGY | Facility: CLINIC | Age: 83
End: 2018-08-14

## 2018-09-07 ENCOUNTER — OFFICE VISIT (OUTPATIENT)
Dept: FAMILY MEDICINE | Facility: CLINIC | Age: 83
End: 2018-09-07
Payer: MEDICARE

## 2018-09-07 VITALS
SYSTOLIC BLOOD PRESSURE: 140 MMHG | BODY MASS INDEX: 22.84 KG/M2 | HEART RATE: 85 BPM | WEIGHT: 159.19 LBS | DIASTOLIC BLOOD PRESSURE: 66 MMHG | TEMPERATURE: 98 F | RESPIRATION RATE: 18 BRPM | OXYGEN SATURATION: 98 %

## 2018-09-07 DIAGNOSIS — I10 ESSENTIAL HYPERTENSION: Primary | ICD-10-CM

## 2018-09-07 DIAGNOSIS — N18.5 ANEMIA OF CHRONIC RENAL FAILURE, STAGE 5: ICD-10-CM

## 2018-09-07 DIAGNOSIS — E03.9 ACQUIRED HYPOTHYROIDISM: ICD-10-CM

## 2018-09-07 DIAGNOSIS — I50.9 CONGESTIVE HEART FAILURE, UNSPECIFIED HF CHRONICITY, UNSPECIFIED HEART FAILURE TYPE: ICD-10-CM

## 2018-09-07 DIAGNOSIS — Z95.5 HISTORY OF HEART ARTERY STENT: ICD-10-CM

## 2018-09-07 DIAGNOSIS — D63.1 ANEMIA OF CHRONIC RENAL FAILURE, STAGE 5: ICD-10-CM

## 2018-09-07 DIAGNOSIS — I21.4 NSTEMI (NON-ST ELEVATED MYOCARDIAL INFARCTION): ICD-10-CM

## 2018-09-07 DIAGNOSIS — Z99.2 ESRD (END STAGE RENAL DISEASE) ON DIALYSIS: ICD-10-CM

## 2018-09-07 DIAGNOSIS — N18.6 ESRD (END STAGE RENAL DISEASE) ON DIALYSIS: ICD-10-CM

## 2018-09-07 PROCEDURE — 99214 OFFICE O/P EST MOD 30 MIN: CPT | Mod: S$PBB,,, | Performed by: INTERNAL MEDICINE

## 2018-09-07 PROCEDURE — 99999 PR PBB SHADOW E&M-EST. PATIENT-LVL III: CPT | Mod: PBBFAC,,, | Performed by: INTERNAL MEDICINE

## 2018-09-07 PROCEDURE — 99213 OFFICE O/P EST LOW 20 MIN: CPT | Mod: PBBFAC,PN | Performed by: INTERNAL MEDICINE

## 2018-09-07 NOTE — PROGRESS NOTES
Subjective:       Patient ID: Jose Maria Jr. is a 86 y.o. male.    Chief Complaint: Follow-up (ESRD, hypertension)    HE recently had MI 8/2/18, but has missed the window for TCC visit. HE has ESRD and is now on hemodialysis 3 times a week. HE sees Dr. Fung. He had new stents placed in his heart. He is feeling pretty good. His BP at home has been 115-147/54-68. He is not sure what he should be eating. He has been given a grocery list by his nutritionist. He is concerned about his weight loss. His BP today is 140/66. HE was referred to an [facility] cardiologist, but he has been following up with Dr. Gorman. He states that he has seen Dr. Gorman a few times since discharge.       Review of Systems   Constitutional: Negative for chills, fatigue and fever.   HENT: Negative for congestion, ear discharge, ear pain, rhinorrhea and sore throat.    Eyes: Negative for discharge, redness and itching.   Respiratory: Negative for cough, chest tightness, shortness of breath and wheezing.    Cardiovascular: Negative for chest pain, palpitations and leg swelling.   Gastrointestinal: Negative for abdominal pain, constipation, diarrhea, nausea and vomiting.   Endocrine: Negative for cold intolerance and heat intolerance.   Genitourinary: Negative for dysuria, flank pain, frequency and hematuria.   Musculoskeletal: Negative for arthralgias, back pain, joint swelling and myalgias.   Skin: Negative for color change and rash.   Neurological: Negative for dizziness, tremors, numbness and headaches.   Psychiatric/Behavioral: Negative for dysphoric mood and sleep disturbance. The patient is not nervous/anxious.        Objective:      Physical Exam   Constitutional: He appears well-developed and well-nourished.   HENT:   Head: Normocephalic and atraumatic.   Right Ear: External ear normal. Tympanic membrane is not erythematous. No middle ear effusion.   Left Ear: External ear normal. Tympanic membrane is not erythematous.  No middle ear  effusion.   Mouth/Throat: No posterior oropharyngeal edema or posterior oropharyngeal erythema. No tonsillar exudate.   Eyes: Conjunctivae and EOM are normal. Pupils are equal, round, and reactive to light.   Neck: No thyromegaly present.   Cardiovascular: Normal rate, regular rhythm, normal heart sounds and intact distal pulses.   No murmur heard.  Pulmonary/Chest: Effort normal and breath sounds normal. He has no wheezes.   Abdominal: He exhibits no distension. There is no tenderness.   Musculoskeletal: He exhibits no edema or tenderness.   Lymphadenopathy:     He has no cervical adenopathy.   Neurological: He displays normal reflexes. No cranial nerve deficit.   Skin: Skin is warm and dry. No rash noted.   Psychiatric: He has a normal mood and affect. His behavior is normal.       Assessment and Plan:     Problem List Items Addressed This Visit     NSTEMI (non-ST elevated myocardial infarction) - HE has been following up with Dr. Gorman.     Relevant Orders    Lipid panel (Completed)    Essential hypertension - Primary - His BP is a little high today. He is going to discuss with Dr. Gorman.     Relevant Orders    CBC auto differential (Completed)    Comprehensive metabolic panel (Completed)    ESRD (end stage renal disease) on dialysis - He has dialysis 3 times a week.     Relevant Orders    Magnesium (Completed)    PHOSPHORUS (Completed)    Anemia of chronic renal failure, stage 5 - probably due to CKD.       Acquired hypothyroidism - check TSH    Relevant Orders    TSH (Completed)    History of heart artery stent - stable.       Other Visit Diagnoses     Congestive heart failure, unspecified HF chronicity, unspecified heart failure type     - follow-up with Dr. Gorman.

## 2018-09-07 NOTE — PATIENT INSTRUCTIONS
We reviewed recent hospital visit for MI. You have been following up with Dr. Gorman about this, but no recent blood work since hospitalization. You are working on diet. Follow the grocery list the kidney doctor gave you. Your BP has been good. Follow-up with Dr. Gorman and Dr. Fung as scheduled.

## 2018-09-09 RX ORDER — LEVOTHYROXINE SODIUM 25 UG/1
25 TABLET ORAL DAILY
Qty: 90 TABLET | Refills: 0 | Status: SHIPPED | OUTPATIENT
Start: 2018-09-09 | End: 2018-12-10 | Stop reason: SDUPTHER

## 2018-09-10 ENCOUNTER — TELEPHONE (OUTPATIENT)
Dept: FAMILY MEDICINE | Facility: CLINIC | Age: 83
End: 2018-09-10

## 2018-09-10 NOTE — TELEPHONE ENCOUNTER
Pt notified med sent to [facility] electronically yesterday afternoon, please call pharmacy to see if and when it is ready, verbalized understanding.

## 2018-09-10 NOTE — TELEPHONE ENCOUNTER
"----- Message from Girish Alarcon sent at 9/10/2018  8:32 AM CDT -----  Contact: self  Refill      [facility]  Lev[facility]ine 25 MCG  1 tablet  Po daily  Need refill today  "I am completely out of pills,  I also need a copy of my lab results"  Please call me back.    "

## 2018-09-12 ENCOUNTER — TELEPHONE (OUTPATIENT)
Dept: INTERNAL MEDICINE | Facility: CLINIC | Age: 83
End: 2018-09-12

## 2018-09-12 NOTE — LETTER
September 12, 2018    Jose Maria Jr.  [address] [place] [id number]             [facility]  [address] [place] [address]   [facility] [address]  Phone: [phone]  Fax: [phone] Dear Jose Maria Jr.      Please find enclosed copies of your results. If you have any questions or concerns, please don't hesitate to call.    Sincerely,        Renuka Singh LPN

## 2018-09-12 NOTE — TELEPHONE ENCOUNTER
----- Message from Cortez Horne sent at 9/12/2018  9:44 AM CDT -----  Contact: wife  He needs a print out of his recent labs mailed to him.

## 2018-09-24 ENCOUNTER — TELEPHONE (OUTPATIENT)
Dept: FAMILY MEDICINE | Facility: CLINIC | Age: 83
End: 2018-09-24

## 2018-09-24 NOTE — TELEPHONE ENCOUNTER
----- Message from Billie Corona MD sent at 9/24/2018  9:26 AM CDT -----  Cholesterol is pretty good. Magnesium was a little low. I will send a prescription for a magnesium supplement. Phosphorus looked good. Creatinine was stable. Liver tests were Ok. TSH was good. Blood counts were about the same as before. Keep follow-up appointment as scheduled in about 6 months.

## 2018-12-10 RX ORDER — LEVOTHYROXINE SODIUM 25 UG/1
25 TABLET ORAL DAILY
Qty: 90 TABLET | Refills: 0 | Status: SHIPPED | OUTPATIENT
Start: 2018-12-10 | End: 2019-03-12 | Stop reason: SDUPTHER

## 2018-12-10 NOTE — TELEPHONE ENCOUNTER
----- Message from Sandy Mckeon sent at 12/10/2018  8:07 AM CST -----  Contact: [phone]/ self   Pt requesting a refill on rx levothyroxine (SYNTHROID) 25 MCG tablet sent to [facility] [phone] . Please advise

## 2019-01-09 ENCOUNTER — TELEPHONE (OUTPATIENT)
Dept: FAMILY MEDICINE | Facility: CLINIC | Age: 84
End: 2019-01-09

## 2019-01-09 NOTE — TELEPHONE ENCOUNTER
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 Please call wife Yulisa when done [phone]. I put paper work in partha .

## 2019-01-11 ENCOUNTER — TELEPHONE (OUTPATIENT)
Dept: FAMILY MEDICINE | Facility: CLINIC | Age: 84
End: 2019-01-11

## 2019-01-11 NOTE — TELEPHONE ENCOUNTER
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 Please advise     It is due today

## 2019-01-14 ENCOUNTER — TELEPHONE (OUTPATIENT)
Dept: FAMILY MEDICINE | Facility: CLINIC | Age: 84
End: 2019-01-14

## 2019-01-14 NOTE — TELEPHONE ENCOUNTER
----- Message from Charleen Florentino sent at 1/14/2019  8:43 AM CST -----  Contact: wife/ Yulisa  Patients wife called and would like to know if patients paper work for his license is ready , she said she dropped off Wed. Of last week. Please call wife when done . [phone]

## 2019-01-15 ENCOUNTER — TELEPHONE (OUTPATIENT)
Dept: FAMILY MEDICINE | Facility: CLINIC | Age: 84
End: 2019-01-15

## 2019-01-15 NOTE — TELEPHONE ENCOUNTER
----- Message from Sandy Mckeon sent at 1/15/2019  8:32 AM CST -----  Contact: [phone]/ self   Pt wants to know if his paper work its cathleen fill out , states he drop off the paper work Wednesday of last week . Please advise

## 2019-01-15 NOTE — TELEPHONE ENCOUNTER
----- Message from Juany Marquez sent at 1/15/2019  8:49 AM CST -----  Contact: Self [phone]   Patient Returning Your Phone Call

## 2019-01-21 ENCOUNTER — OFFICE VISIT (OUTPATIENT)
Dept: FAMILY MEDICINE | Facility: CLINIC | Age: 84
End: 2019-01-21
Payer: MEDICARE

## 2019-01-21 VITALS
DIASTOLIC BLOOD PRESSURE: 60 MMHG | SYSTOLIC BLOOD PRESSURE: 136 MMHG | OXYGEN SATURATION: 98 % | HEART RATE: 94 BPM | RESPIRATION RATE: 18 BRPM | WEIGHT: 163.63 LBS | TEMPERATURE: 98 F | BODY MASS INDEX: 23.43 KG/M2 | HEIGHT: 70 IN

## 2019-01-21 DIAGNOSIS — N18.6 END STAGE RENAL DISEASE: ICD-10-CM

## 2019-01-21 DIAGNOSIS — Z99.2 ANEMIA IN CHRONIC KIDNEY DISEASE, ON CHRONIC DIALYSIS: ICD-10-CM

## 2019-01-21 DIAGNOSIS — E88.09 HYPOALBUMINEMIA DUE TO PROTEIN-CALORIE MALNUTRITION: ICD-10-CM

## 2019-01-21 DIAGNOSIS — R73.03 PREDIABETES: ICD-10-CM

## 2019-01-21 DIAGNOSIS — Z79.01 ANTICOAGULATED: ICD-10-CM

## 2019-01-21 DIAGNOSIS — N40.1 BENIGN PROSTATIC HYPERPLASIA WITH WEAK URINARY STREAM: ICD-10-CM

## 2019-01-21 DIAGNOSIS — I50.22 CHRONIC SYSTOLIC HEART FAILURE: ICD-10-CM

## 2019-01-21 DIAGNOSIS — I25.10 CORONARY ARTERY DISEASE INVOLVING NATIVE CORONARY ARTERY OF NATIVE HEART WITHOUT ANGINA PECTORIS: Primary | ICD-10-CM

## 2019-01-21 DIAGNOSIS — E03.9 ACQUIRED HYPOTHYROIDISM: ICD-10-CM

## 2019-01-21 DIAGNOSIS — N18.6 ANEMIA IN CHRONIC KIDNEY DISEASE, ON CHRONIC DIALYSIS: ICD-10-CM

## 2019-01-21 DIAGNOSIS — Z95.1 HX OF CABG: ICD-10-CM

## 2019-01-21 DIAGNOSIS — I10 ESSENTIAL HYPERTENSION: ICD-10-CM

## 2019-01-21 DIAGNOSIS — N18.5 ANEMIA OF CHRONIC RENAL FAILURE, STAGE 5: ICD-10-CM

## 2019-01-21 DIAGNOSIS — D63.1 ANEMIA OF CHRONIC RENAL FAILURE, STAGE 5: ICD-10-CM

## 2019-01-21 DIAGNOSIS — D63.1 ANEMIA IN CHRONIC KIDNEY DISEASE, ON CHRONIC DIALYSIS: ICD-10-CM

## 2019-01-21 DIAGNOSIS — N25.81 SECONDARY HYPERPARATHYROIDISM: ICD-10-CM

## 2019-01-21 DIAGNOSIS — Z95.5 HISTORY OF HEART ARTERY STENT: ICD-10-CM

## 2019-01-21 DIAGNOSIS — K21.9 GASTROESOPHAGEAL REFLUX DISEASE WITHOUT ESOPHAGITIS: ICD-10-CM

## 2019-01-21 DIAGNOSIS — E46 HYPOALBUMINEMIA DUE TO PROTEIN-CALORIE MALNUTRITION: ICD-10-CM

## 2019-01-21 DIAGNOSIS — R39.12 BENIGN PROSTATIC HYPERPLASIA WITH WEAK URINARY STREAM: ICD-10-CM

## 2019-01-21 PROBLEM — T82.858A STENOSIS OF ARTERIOVENOUS DIALYSIS FISTULA: Status: RESOLVED | Noted: 2018-01-03 | Resolved: 2019-01-21

## 2019-01-21 PROBLEM — T82.590A DIALYSIS AV FISTULA MALFUNCTION: Status: RESOLVED | Noted: 2018-04-10 | Resolved: 2019-01-21

## 2019-01-21 PROBLEM — I20.0 UNSTABLE ANGINA: Status: RESOLVED | Noted: 2018-07-31 | Resolved: 2019-01-21

## 2019-01-21 PROCEDURE — 99215 OFFICE O/P EST HI 40 MIN: CPT | Mod: S$PBB,,, | Performed by: INTERNAL MEDICINE

## 2019-01-21 PROCEDURE — 99215 PR OFFICE/OUTPT VISIT, EST, LEVL V, 40-54 MIN: ICD-10-PCS | Mod: S$PBB,,, | Performed by: INTERNAL MEDICINE

## 2019-01-21 PROCEDURE — 99999 PR PBB SHADOW E&M-EST. PATIENT-LVL IV: CPT | Mod: PBBFAC,,, | Performed by: INTERNAL MEDICINE

## 2019-01-21 PROCEDURE — 99214 OFFICE O/P EST MOD 30 MIN: CPT | Mod: PBBFAC,PN | Performed by: INTERNAL MEDICINE

## 2019-01-21 PROCEDURE — 99999 PR PBB SHADOW E&M-EST. PATIENT-LVL IV: ICD-10-PCS | Mod: PBBFAC,,, | Performed by: INTERNAL MEDICINE

## 2019-01-21 RX ORDER — ISOSORBIDE MONONITRATE 120 MG/1
1 TABLET, EXTENDED RELEASE ORAL DAILY
Refills: 3 | COMMUNITY
Start: 2018-12-26

## 2019-01-21 RX ORDER — TRAZODONE HYDROCHLORIDE 50 MG/1
50 TABLET ORAL NIGHTLY PRN
COMMUNITY
End: 2019-04-22

## 2019-01-21 NOTE — PATIENT INSTRUCTIONS
We have reviewed your prescription medications. 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 I have reviewed labs from dialysis. [facility]. I performed your foot exam. Follow-up with podiatry also. Continue to follow-up with cardiology as recommended.

## 2019-01-21 NOTE — PROGRESS NOTES
Subjective:       Patient ID: Jose Maria Jr. is a 86 y.o. male.    Chief Complaint: Follow-up (3 month folow up); Discussion (discuss and need paper work to drive); Diabetic Eye Photo (wife stated will make appt for , pt has lens in both eyes); and Diabetic Foot Exam     I have reviewed the PMH,  and FH for this patient. He is accompanied by his wife who assists with history. He has brought some forms from the [employer] to evaluate his suitability for driving. 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  His wife reports that she is usually with him when he is driving.  We explained that if she distract him and questions his decisions that that can be a bad thing.  I have advised her just to sit quietly beside him.  We tested his reflexes in the office and he was able to catch a pen that I dropped.  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 He is due for an eye exam. His wife will make the appointment. We did his foot exam today.   He has brought a copy of recent labs that were done in dialysis.  His hematocrit is slightly low at 30. The last blood work that I did showed that he had a good cholesterol with LDL of 94.  His thyroid was normal at that time.  He does not have diabetes.  His last hemoglobin A1c was 5.5.  He reports that he does have neuropathy.  He attends dialysis 3 times a week and that is going well for him.  He sees Cardiology for heart conditions.  The cardiologist's filled out the cardiac portion of this  exam.          Active Ambulatory Problems     Diagnosis Date Noted    Coronary artery disease involving native coronary artery of native heart without angina pectoris 07/03/2013    Essential hypertension 04/07/2016    HLD (hyperlipidemia) 04/07/2016    ESRD (end stage renal disease) on dialysis 04/07/2016    Anemia of chronic renal failure, stage 5 04/07/2016    Acquired hypothyroidism 12/26/2016    Gout     BPH (benign prostatic hyperplasia)     Vitamin B 12 deficiency 12/29/2016    Anxiety 12/31/2016    Persistent proteinuria 02/05/2017    Renal cyst 02/05/2017    Secondary hyperparathyroidism 02/05/2017    Prediabetes 06/09/2017    Hyperphosphatemia 08/13/2017    Gastroesophageal reflux disease without esophagitis 09/09/2017    Chronic systolic heart failure 09/26/2017    Coronary artery disease involving coronary bypass graft of native heart with angina pectoris 09/28/2017    Hypoalbuminemia due to protein-calorie malnutrition 10/08/2017    Episode of transient neurologic symptoms 10/10/2017    End stage renal disease 04/25/2018    History of MI (myocardial infarction) 06/22/2018    Anemia in chronic kidney disease, on chronic dialysis 06/22/2018    Anticoagulated 06/25/2018    Hx of CABG 07/31/2018    History of heart artery stent 07/31/2018     Resolved Ambulatory Problems     Diagnosis Date Noted    AP (angina pectoris) 07/03/2013    Gout, chronic 07/03/2013    Aphasia 04/04/2016    Altered mental state 04/04/2016    Impairment of balance     NSTEMI (non-ST elevated myocardial infarction) 04/07/2016    Acute encephalopathy 04/07/2016    Acute respiratory failure with hypoxia 04/07/2016    Congenital hypothyroidism without goiter 04/07/2016    Pneumonia of right lower lobe due to infectious organism 12/26/2016    Hypomagnesemia 12/26/2016    Elevated troponin 12/26/2016    Acute on chronic diastolic (congestive) heart failure 12/26/2016    LORENZO (acute kidney injury)  12/26/2016    Severe sepsis 12/27/2016    Hypomagnesemia 01/02/2017    Hospital discharge follow-up 01/10/2017    Viral syndrome 01/20/2017    Acute renal failure 01/21/2017    Frequent falls 03/17/2017    Orthostatic hypotension 03/18/2017    Chest pain 09/08/2017    CKD (chronic kidney disease) stage 4, GFR 15-29 ml/min 09/29/2017    Uremia 10/05/2017    ESRD (end stage renal disease) 10/06/2017    Swelling of right upper extremity 10/08/2017    Stenosis of arteriovenous dialysis fistula 01/03/2018    Dialysis AV fistula malfunction 04/10/2018    Unstable angina 07/31/2018     Past Medical History:   Diagnosis Date    Anticoagulant long-term use     Arthritis     Basal cell carcinoma     BPH (benign prostatic hyperplasia)     CHF (congestive heart failure)     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 4/7/2016    Coronary artery disease     Encounter for blood transfusion     ESRD (end stage renal disease) on dialysis     Gout     High cholesterol     Hypertension     Persistent proteinuria 2/5/2017    Renal cyst 2/5/2017    Thyroid disease     Urinary tract infection        HEALTH MAINTENANCE:   Health Maintenance   Topic Date Due    TETANUS VACCINE  05/27/1950    Zoster Vaccine  05/27/1992    Hemoglobin A1c  12/09/2017    Eye Exam  06/20/2018    Influenza Vaccine  08/01/2018    Lipid Panel  09/07/2019    Foot Exam  01/21/2020       Review of Systems   Constitutional: Negative for appetite change, chills, fatigue, fever and unexpected weight change.   HENT: Negative for congestion, ear pain, mouth sores, postnasal drip, rhinorrhea, sinus pressure, sinus pain and sore throat.    Eyes: Negative for photophobia, discharge, redness, itching and visual disturbance.   Respiratory: Negative for cough, chest tightness, shortness of breath and wheezing.    Cardiovascular: Negative for chest pain, palpitations and leg swelling.   Gastrointestinal: Negative for abdominal pain, blood in  stool, constipation, diarrhea, nausea and vomiting.   Endocrine: Negative for cold intolerance and heat intolerance.   Genitourinary: Negative for difficulty urinating, discharge, dysuria, flank pain, frequency and urgency.   Musculoskeletal: Positive for arthralgias. Negative for back pain, joint swelling, myalgias and neck pain.   Skin: Negative for rash and wound.   Neurological: Positive for numbness. Negative for dizziness, tremors, syncope, weakness, light-headedness and headaches.   Hematological: Negative for adenopathy. Does not bruise/bleed easily.   Psychiatric/Behavioral: Negative for agitation, confusion and sleep disturbance. The patient is not nervous/anxious.        Objective:          LABS    CMP  Sodium   Date Value Ref Range Status   09/07/2018 141 136 - 145 mmol/L Final   08/07/2017 145 135 - 145 Final     Potassium   Date Value Ref Range Status   09/07/2018 4.1 3.5 - 5.1 mmol/L Final   08/07/2017 4.1 3.5 - 5.0 Final     Chloride   Date Value Ref Range Status   09/07/2018 98 95 - 110 mmol/L Final   08/07/2017 103 98 - 107 Final     CO2   Date Value Ref Range Status   09/07/2018 26 23 - 29 mmol/L Final   08/07/2017 24 21 - 31 Final     Glucose   Date Value Ref Range Status   09/07/2018 121 (H) 70 - 110 mg/dL Final     BUN, Bld   Date Value Ref Range Status   09/07/2018 38 (H) 2 - 20 mg/dL Final     BUN   Date Value Ref Range Status   08/07/2017 58 (H) 7 - 21 mg/dL Final     Creatinine   Date Value Ref Range Status   09/07/2018 4.47 (H) 0.50 - 1.40 mg/dL Final   08/07/2017 3.4 (H) 0.7 - 1.2 mg/dL Final     Calcium   Date Value Ref Range Status   09/07/2018 9.7 8.7 - 10.5 mg/dL Final   08/07/2017 10.9 (H) 8.5 - 10.3 mg/dL Final     Total Protein   Date Value Ref Range Status   09/07/2018 7.5 6.0 - 8.4 g/dL Final     Albumin   Date Value Ref Range Status   09/07/2018 4.5 3.5 - 5.2 g/dL Final     Total Bilirubin   Date Value Ref Range Status   09/07/2018 0.5 0.1 - 1.0 mg/dL Final     Comment:     For  infants and newborns, interpretation of results should be based  on gestational age, weight and in agreement with clinical  observations.  Premature Infant recommended reference ranges:  Up to 24 hours.............<8.0 mg/dL  Up to 48 hours............<12.0 mg/dL  3-5 days..................<15.0 mg/dL  6-29 days.................<15.0 mg/dL       Alkaline Phosphatase   Date Value Ref Range Status   09/07/2018 52 38 - 126 U/L Final     AST   Date Value Ref Range Status   09/07/2018 21 15 - 46 U/L Final     ALT   Date Value Ref Range Status   09/07/2018 9 (L) 10 - 44 U/L Final     Anion Gap   Date Value Ref Range Status   09/07/2018 17 (H) 8 - 16 mmol/L Final   08/07/2017 22 (H) 9 - 18 mmol/L Final     eGFR if    Date Value Ref Range Status   09/07/2018 12.8 (A) >60 mL/min/1.73 m^2 Final     eGFR if non    Date Value Ref Range Status   09/07/2018 11.1 (A) >60 mL/min/1.73 m^2 Final     Comment:     Calculation used to obtain the estimated glomerular filtration  rate (eGFR) is the CKD-EPI equation.          Lab Results   Component Value Date    WBC 5.01 09/07/2018    HGB 10.1 (L) 09/07/2018    HCT 32.6 (L) 09/07/2018     (H) 09/07/2018     09/07/2018       No results for input(s): TSH, HDL, CHOL, TRIG, LDLCALC, CHOLHDL, NONHDLCHOL, TOTALCHOLEST in the last 2160 hours.      A1C:  No results for input(s): HGBA1C in the last 2160 hours.      Physical Exam   Constitutional: He appears well-developed and well-nourished.   HENT:   Head: Normocephalic and atraumatic.   Right Ear: External ear normal. Tympanic membrane is not erythematous. No middle ear effusion.   Left Ear: External ear normal. Tympanic membrane is not erythematous.  No middle ear effusion.   Mouth/Throat: No posterior oropharyngeal edema or posterior oropharyngeal erythema. No tonsillar exudate.   Eyes: Conjunctivae and EOM are normal. Pupils are equal, round, and reactive to light.   Neck: No thyromegaly present.    Cardiovascular: Normal rate, regular rhythm, normal heart sounds and intact distal pulses.   No murmur heard.  Pulses:       Dorsalis pedis pulses are 2+ on the right side, and 2+ on the left side.        Posterior tibial pulses are 2+ on the right side, and 2+ on the left side.   Pulmonary/Chest: Effort normal and breath sounds normal. He has no wheezes.   Abdominal: He exhibits no distension. There is no tenderness.   Musculoskeletal: He exhibits no edema or tenderness.        Right foot: There is normal range of motion and no deformity.        Left foot: There is normal range of motion and no deformity.   Feet:   Right Foot:   Protective Sensation: 10 sites tested. 10 sites sensed.   Skin Integrity: Positive for callus. Negative for ulcer, blister, skin breakdown, erythema, warmth or dry skin.   Left Foot:   Protective Sensation: 10 sites tested. 10 sites sensed.   Skin Integrity: Positive for callus. Negative for ulcer, blister, skin breakdown, erythema, warmth or dry skin.   Lymphadenopathy:     He has no cervical adenopathy.   Neurological: He displays normal reflexes. No cranial nerve deficit.   Skin: Skin is warm and dry. No rash noted.   Dark line on great toe nail . Thickening of toenails.    Psychiatric: He has a normal mood and affect. His behavior is normal.             Assessment and Plan:     Problem List Items Addressed This Visit        Cardiac/Vascular    Coronary artery disease involving native coronary artery of native heart without angina pectoris - Primary - stable. Continue to folow-up with cardiology as recommended.       Essential hypertension - BP looked good today. Stable,.       Chronic systolic heart failure - stable. Follow-up with cardiology.      Hx of CABG - stable. Follow-up with cradiology      History of heart artery stent - stable. Follow-up with cardiology       Renal/    BPH (benign prostatic hyperplasia) - HE is on proscar and flomax. Stable.       End stage renal disease  - He is on dialysis 3 times a week. This is going wel.       Anemia in chronic kidney disease, on chronic dialysis - Hct seems a little lower. He may need to see hematology if it continues to get worse.                   Oncology    Anemia of chronic renal failure, stage 5 - we will watch this for now.        Endocrine    Acquired hypothyroidism - tsh was normal last time. On synthroid.       Secondary hyperparathyroidism - calcium was OK. We will watch.       Prediabetes - last HgbA1c was 5.5. I did foot exam.        GI    Gastroesophageal reflux disease without esophagitis - He is on omeprazole. Stable.        Other    Hypoalbuminemia due to protein-calorie malnutrition - he is on dialysis. Working on diet.                  Follow-up with me in 3 months.

## 2019-03-12 DIAGNOSIS — E03.9 ACQUIRED HYPOTHYROIDISM: Primary | ICD-10-CM

## 2019-03-12 RX ORDER — LEVOTHYROXINE SODIUM 25 UG/1
25 TABLET ORAL DAILY
Qty: 90 TABLET | Refills: 1 | Status: SHIPPED | OUTPATIENT
Start: 2019-03-12

## 2019-03-12 NOTE — TELEPHONE ENCOUNTER
----- Message from Carolina Gibson sent at 3/11/2019  3:19 PM CDT -----  Contact: spouse, [phone] (M)  Called in requesting patient's Levothyroxine prescription refill sent to [facility] pharmacy. Please advise.

## 2019-03-12 NOTE — TELEPHONE ENCOUNTER
----- Message from Kirsty Escobedo sent at 3/12/2019  8:59 AM CDT -----  Contact: [phone]  Patient would like refill of [facility]othy[facility] (SYNTHROID) 25 MCG [facility] Patient advised he took his last pill this morning. Please call.

## 2019-04-03 DIAGNOSIS — R39.12 BENIGN PROSTATIC HYPERPLASIA WITH WEAK URINARY STREAM: ICD-10-CM

## 2019-04-03 DIAGNOSIS — N18.6 ESRD (END STAGE RENAL DISEASE) ON DIALYSIS: ICD-10-CM

## 2019-04-03 DIAGNOSIS — Z99.2 ESRD (END STAGE RENAL DISEASE) ON DIALYSIS: ICD-10-CM

## 2019-04-03 DIAGNOSIS — N28.1 RENAL CYST: ICD-10-CM

## 2019-04-03 DIAGNOSIS — N40.1 BENIGN PROSTATIC HYPERPLASIA WITH WEAK URINARY STREAM: ICD-10-CM

## 2019-04-03 RX ORDER — FINASTERIDE 5 MG/1
5 TABLET, FILM COATED ORAL DAILY
Qty: 30 TABLET | Refills: 11 | OUTPATIENT
Start: 2019-04-03

## 2019-04-03 RX ORDER — TAMSULOSIN HYDROCHLORIDE 0.4 MG/1
0.4 CAPSULE ORAL DAILY
Qty: 30 CAPSULE | Refills: 11 | OUTPATIENT
Start: 2019-04-03 | End: 2020-04-02

## 2019-04-04 RX ORDER — FINASTERIDE 5 MG/1
5 TABLET, FILM COATED ORAL DAILY
Qty: 30 TABLET | Refills: 11 | OUTPATIENT
Start: 2019-04-04

## 2019-04-04 RX ORDER — TAMSULOSIN HYDROCHLORIDE 0.4 MG/1
0.4 CAPSULE ORAL DAILY
Qty: 30 CAPSULE | Refills: 11 | OUTPATIENT
Start: 2019-04-04 | End: 2020-04-03

## 2019-04-04 NOTE — TELEPHONE ENCOUNTER
----- Message from Juany Jeronimo sent at 4/4/2019 10:29 AM CDT -----  Contact: Self [phone]  Patient is calling to get refills on his medication sent to [facility] - [address], [place] [phone] (Phone)  [phone] (Fax)      1. finasteride (PROSCAR) 5 mg tablet 30 tablet     2. tamsulosin (FLOMAX) 0.4 mg Cp24 30 capsule

## 2019-04-08 ENCOUNTER — PATIENT OUTREACH (OUTPATIENT)
Dept: ADMINISTRATIVE | Facility: HOSPITAL | Age: 84
End: 2019-04-08

## 2019-04-15 ENCOUNTER — TELEPHONE (OUTPATIENT)
Dept: FAMILY MEDICINE | Facility: CLINIC | Age: 84
End: 2019-04-15

## 2019-04-15 DIAGNOSIS — N18.6 ESRD (END STAGE RENAL DISEASE) ON DIALYSIS: Primary | ICD-10-CM

## 2019-04-15 DIAGNOSIS — Z99.2 ESRD (END STAGE RENAL DISEASE) ON DIALYSIS: Primary | ICD-10-CM

## 2019-04-15 DIAGNOSIS — R73.03 PREDIABETES: ICD-10-CM

## 2019-04-15 NOTE — TELEPHONE ENCOUNTER
----- Message from Billie Corona MD sent at 4/15/2019  1:35 PM CDT -----  Hemoglobin A1c was about the same. Continue to watch diet.

## 2019-04-22 ENCOUNTER — OFFICE VISIT (OUTPATIENT)
Dept: FAMILY MEDICINE | Facility: CLINIC | Age: 84
End: 2019-04-22
Payer: COMMERCIAL

## 2019-04-22 VITALS
HEART RATE: 61 BPM | DIASTOLIC BLOOD PRESSURE: 50 MMHG | WEIGHT: 169.63 LBS | OXYGEN SATURATION: 95 % | SYSTOLIC BLOOD PRESSURE: 126 MMHG | BODY MASS INDEX: 24.28 KG/M2 | TEMPERATURE: 98 F | HEIGHT: 70 IN

## 2019-04-22 DIAGNOSIS — D63.1 ANEMIA IN CHRONIC KIDNEY DISEASE, ON CHRONIC DIALYSIS: ICD-10-CM

## 2019-04-22 DIAGNOSIS — Z99.2 ESRD (END STAGE RENAL DISEASE) ON DIALYSIS: ICD-10-CM

## 2019-04-22 DIAGNOSIS — I25.709 CORONARY ARTERY DISEASE INVOLVING CORONARY BYPASS GRAFT OF NATIVE HEART WITH ANGINA PECTORIS: Primary | ICD-10-CM

## 2019-04-22 DIAGNOSIS — Z99.2 ANEMIA IN CHRONIC KIDNEY DISEASE, ON CHRONIC DIALYSIS: ICD-10-CM

## 2019-04-22 DIAGNOSIS — Z79.01 ANTICOAGULATED: ICD-10-CM

## 2019-04-22 DIAGNOSIS — E03.9 ACQUIRED HYPOTHYROIDISM: ICD-10-CM

## 2019-04-22 DIAGNOSIS — N18.6 ANEMIA IN CHRONIC KIDNEY DISEASE, ON CHRONIC DIALYSIS: ICD-10-CM

## 2019-04-22 DIAGNOSIS — I10 ESSENTIAL HYPERTENSION: ICD-10-CM

## 2019-04-22 DIAGNOSIS — E78.49 OTHER HYPERLIPIDEMIA: ICD-10-CM

## 2019-04-22 DIAGNOSIS — N18.6 ESRD (END STAGE RENAL DISEASE) ON DIALYSIS: ICD-10-CM

## 2019-04-22 PROCEDURE — 99999 PR PBB SHADOW E&M-EST. PATIENT-LVL III: ICD-10-PCS | Mod: PBBFAC,,, | Performed by: INTERNAL MEDICINE

## 2019-04-22 PROCEDURE — 99214 OFFICE O/P EST MOD 30 MIN: CPT | Mod: S$PBB,,, | Performed by: INTERNAL MEDICINE

## 2019-04-22 PROCEDURE — 99999 PR PBB SHADOW E&M-EST. PATIENT-LVL III: CPT | Mod: PBBFAC,,, | Performed by: INTERNAL MEDICINE

## 2019-04-22 PROCEDURE — 99214 PR OFFICE/OUTPT VISIT, EST, LEVL IV, 30-39 MIN: ICD-10-PCS | Mod: S$PBB,,, | Performed by: INTERNAL MEDICINE

## 2019-04-22 PROCEDURE — 99213 OFFICE O/P EST LOW 20 MIN: CPT | Mod: PBBFAC,PN | Performed by: INTERNAL MEDICINE

## 2019-04-22 RX ORDER — TAMSULOSIN HYDROCHLORIDE 0.4 MG/1
CAPSULE ORAL
Refills: 1 | COMMUNITY
Start: 2019-04-05

## 2019-04-22 RX ORDER — TRAZODONE HYDROCHLORIDE 100 MG/1
TABLET ORAL
Refills: 6 | COMMUNITY
Start: 2019-03-22

## 2019-04-23 NOTE — PROGRESS NOTES
Subjective:       Patient ID: Jose Maria Jr. is a 86 y.o. male.    Chief Complaint: Coronary Artery Disease (3 month follow up)     I have reviewed the PMH,  and  for this patient.  The patient presents today for follow-up of chronic conditions.  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 He has been doing well. He has been going to dialysis three times a week. His shunt is in good place. He reports that he has been seeing Dr Gorman twice a year. He had labs a few months ago. [facility]. He will try to get us a copy of the record. He is also wanting to bring us a copy of labs [facility] the [facility].     Active Ambulatory Problems     Diagnosis Date Noted    Coronary artery disease involving native coronary artery of native heart without angina pectoris 07/03/2013    Essential hypertension 04/07/2016    HLD (hyperlipidemia) 04/07/2016    ESRD (end stage renal disease) on dialysis 04/07/2016    Anemia of chronic renal failure, stage 5 04/07/2016    Acquired hypothyroidism 12/26/2016    Gout     BPH (benign prostatic hyperplasia)     Vitamin B 12 deficiency 12/29/2016    Anxiety 12/31/2016    Persistent proteinuria 02/05/2017    Renal cyst 02/05/2017    Secondary hyperparathyroidism 02/05/2017    Prediabetes 06/09/2017    Hyperphosphatemia 08/13/2017    Gastroesophageal reflux disease without esophagitis 09/09/2017    Chronic systolic heart failure 09/26/2017    Coronary artery disease involving coronary  bypass graft of native heart with angina pectoris 09/28/2017    Hypoalbuminemia due to protein-calorie malnutrition 10/08/2017    Episode of transient neurologic symptoms 10/10/2017    End stage renal disease 04/25/2018    History of MI (myocardial infarction) 06/22/2018    Anemia in chronic kidney disease, on chronic dialysis 06/22/2018    Anticoagulated 06/25/2018    Hx of CABG 07/31/2018    History of heart artery stent 07/31/2018     Resolved Ambulatory Problems     Diagnosis Date Noted    AP (angina pectoris) 07/03/2013    Gout, chronic 07/03/2013    Aphasia 04/04/2016    Altered mental state 04/04/2016    Impairment of balance     NSTEMI (non-ST elevated myocardial infarction) 04/07/2016    Acute encephalopathy 04/07/2016    Acute respiratory failure with hypoxia 04/07/2016    Congenital hypothyroidism without goiter 04/07/2016    Pneumonia of right lower lobe due to infectious organism 12/26/2016    Hypomagnesemia 12/26/2016    Elevated troponin 12/26/2016    Acute on chronic diastolic (congestive) heart failure 12/26/2016    LORNEZO (acute kidney injury) 12/26/2016    Severe sepsis 12/27/2016    Hypomagnesemia 01/02/2017    Hospital discharge follow-up 01/10/2017    Viral syndrome 01/20/2017    Acute renal failure 01/21/2017    Frequent falls 03/17/2017    Orthostatic hypotension 03/18/2017    Chest pain 09/08/2017    CKD (chronic kidney disease) stage 4, GFR 15-29 ml/min 09/29/2017    Uremia 10/05/2017    ESRD (end stage renal disease) 10/06/2017    Swelling of right upper extremity 10/08/2017    Stenosis of arteriovenous dialysis fistula 01/03/2018    Dialysis AV fistula malfunction 04/10/2018    Unstable angina 07/31/2018     Past Medical History:   Diagnosis Date    Anticoagulant long-term use     Arthritis     Basal cell carcinoma     BPH (benign prostatic hyperplasia)     CHF (congestive heart failure)     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 , Disp: , Rfl:     allopurinol (ZYLOPRIM) 100 MG tablet, Take 1 tablet (100 mg total) by mouth once daily., Disp: 30 tablet, Rfl: 0    aspirin 81 MG Chew, Take 81 mg by mouth once daily., Disp: , Rfl:     finasteride (PROSCAR) 5 mg tablet, Take 1 tablet (5 mg total) by mouth once daily., Disp: 30 tablet, Rfl: 11    isosorbide mononitrate (IMDUR) 120 MG 24 hr tablet, Take 1 tablet by mouth Daily., Disp: , Rfl: 3    levothyroxine (SYNTHROID) 25 MCG tablet, Take 1 tablet (25 mcg total) by mouth once daily., Disp: 90 tablet, Rfl: 1    omeprazole (PRILOSEC) 20 MG capsule, Take 1 capsule (20 mg total) by mouth once daily., Disp: 90 capsule, Rfl: 3    rosuvastatin (CRESTOR) 40 MG Tab, , Disp: , Rfl: 3    tamsulosin (FLOMAX) 0.4 mg Cap, , Disp: , Rfl: 1    traZODone (DESYREL) 100 MG tablet, , Disp: , Rfl: 6    furosemide (LASIX) 80 MG tablet, Take 1 tablet (80 mg total) by mouth 2 (two) times daily., Disp: 180 tablet, Rfl: 3    lidocaine-prilocaine (EMLA) cream, APPLY TOPICALLY EVERY TUES, THURS, SAT., Disp: 30 g, Rfl: 12    metoprolol succinate (TOPROL-XL) 25 MG 24 hr tablet, Take 0.5 tablets (12.5 mg total) by mouth once daily., Disp: 15 tablet, Rfl: 11    nitroGLYCERIN (NITROSTAT) 0.4 MG SL tablet, Place 1 tablet (0.4 mg total) under the tongue every 5 (five) minutes as needed for Chest pain., Disp: 25 tablet, Rfl: 1    temazepam (RESTORIL) 30 mg capsule, Take 30 mg by mouth nightly as needed. , Disp: , Rfl:       HEALTH MAINTENANCE:   Health Maintenance   Topic Date Due    TETANUS VACCINE  05/27/1950    Zoster Vaccine  05/27/1992     Pneumococcal Vaccine (65+ High/Highest Risk) (1 of 2 - PCV13) 05/27/1997    Hemoglobin A1c  10/15/2019    Foot Exam  01/21/2020    Eye Exam  01/23/2020    Lipid Panel  02/20/2020    Influenza Vaccine  Completed       Review of Systems   Constitutional: Negative for chills, fatigue and fever.   HENT: Negative for congestion, ear discharge, ear pain, rhinorrhea and sore throat.    Eyes: Negative for discharge, redness and itching.   Respiratory: Negative for cough, chest tightness, shortness of breath and wheezing.    Cardiovascular: Negative for chest pain, palpitations and leg swelling.   Gastrointestinal: Negative for abdominal pain, constipation, diarrhea, nausea and vomiting.   Endocrine: Negative for cold intolerance and heat intolerance.   Genitourinary: Negative for dysuria, flank pain, frequency and hematuria.   Musculoskeletal: Negative for arthralgias, back pain, joint swelling and myalgias.   Skin: Negative for color change and rash.   Neurological: Negative for dizziness, tremors, numbness and headaches.   Psychiatric/Behavioral: Negative for dysphoric mood and sleep disturbance. The patient is not nervous/anxious.        Objective:          LABS    CMP  Sodium   Date Value Ref Range Status   09/07/2018 141 136 - 145 mmol/L Final   08/07/2017 145 135 - 145 Final     Potassium   Date Value Ref Range Status   09/07/2018 4.1 3.5 - 5.1 mmol/L Final   08/07/2017 4.1 3.5 - 5.0 Final     Chloride   Date Value Ref Range Status   09/07/2018 98 95 - 110 mmol/L Final   08/07/2017 103 98 - 107 Final     CO2   Date Value Ref Range Status   09/07/2018 26 23 - 29 mmol/L Final   08/07/2017 24 21 - 31 Final     Glucose   Date Value Ref Range Status   09/07/2018 121 (H) 70 - 110 mg/dL Final     BUN, Bld   Date Value Ref Range Status   09/07/2018 38 (H) 2 - 20 mg/dL Final     BUN   Date Value Ref Range Status   08/07/2017 58 (H) 7 - 21 mg/dL Final     Creatinine   Date Value Ref Range Status   09/07/2018 4.47 (H) 0.50  - 1.40 mg/dL Final   08/07/2017 3.4 (H) 0.7 - 1.2 mg/dL Final     Calcium   Date Value Ref Range Status   09/07/2018 9.7 8.7 - 10.5 mg/dL Final   08/07/2017 10.9 (H) 8.5 - 10.3 mg/dL Final     Total Protein   Date Value Ref Range Status   09/07/2018 7.5 6.0 - 8.4 g/dL Final     Albumin   Date Value Ref Range Status   09/07/2018 4.5 3.5 - 5.2 g/dL Final     Total Bilirubin   Date Value Ref Range Status   09/07/2018 0.5 0.1 - 1.0 mg/dL Final     Comment:     For infants and newborns, interpretation of results should be based  on gestational age, weight and in agreement with clinical  observations.  Premature Infant recommended reference ranges:  Up to 24 hours.............<8.0 mg/dL  Up to 48 hours............<12.0 mg/dL  3-5 days..................<15.0 mg/dL  6-29 days.................<15.0 mg/dL       Alkaline Phosphatase   Date Value Ref Range Status   09/07/2018 52 38 - 126 U/L Final     AST   Date Value Ref Range Status   09/07/2018 21 15 - 46 U/L Final     ALT   Date Value Ref Range Status   09/07/2018 9 (L) 10 - 44 U/L Final     Anion Gap   Date Value Ref Range Status   09/07/2018 17 (H) 8 - 16 mmol/L Final   08/07/2017 22 (H) 9 - 18 mmol/L Final     eGFR if    Date Value Ref Range Status   09/07/2018 12.8 (A) >60 mL/min/1.73 m^2 Final     eGFR if non    Date Value Ref Range Status   09/07/2018 11.1 (A) >60 mL/min/1.73 m^2 Final     Comment:     Calculation used to obtain the estimated glomerular filtration  rate (eGFR) is the CKD-EPI equation.          Lab Results   Component Value Date    WBC 5.01 09/07/2018    HGB 10.1 (L) 09/07/2018    HCT 32.6 (L) 09/07/2018     (H) 09/07/2018     09/07/2018       No results for input(s): TSH, HDL, CHOL, TRIG, LDLCALC, CHOLHDL, NONHDLCHOL, TOTALCHOLEST in the last 2160 hours.      A1C:  Recent Labs   Lab Result Units 04/15/19  0803   Hemoglobin A1C % 5.7*         Physical Exam   Constitutional: He appears well-developed and  well-nourished. He does not have a sickly appearance.   HENT:   Head: Normocephalic and atraumatic.   Right Ear: External ear normal. Tympanic membrane is not erythematous. No middle ear effusion.   Left Ear: External ear normal. Tympanic membrane is not erythematous.  No middle ear effusion.   Nose: No rhinorrhea. Right sinus exhibits no maxillary sinus tenderness and no frontal sinus tenderness. Left sinus exhibits no maxillary sinus tenderness and no frontal sinus tenderness.   Mouth/Throat: No posterior oropharyngeal edema or posterior oropharyngeal erythema. No tonsillar exudate.   Eyes: Pupils are equal, round, and reactive to light. Conjunctivae and EOM are normal. Right eye exhibits no discharge. Left eye exhibits no discharge. Right conjunctiva is not injected. Left conjunctiva is not injected.   Neck: No thyromegaly present.   Cardiovascular: Normal rate, regular rhythm, normal heart sounds and intact distal pulses.   No murmur heard.  Pulmonary/Chest: Effort normal and breath sounds normal. He has no wheezes. He has no rhonchi. He has no rales.   Abdominal: Bowel sounds are normal. He exhibits no distension. There is no tenderness.   Musculoskeletal: He exhibits no edema or tenderness.   Lymphadenopathy:     He has no cervical adenopathy.   Neurological: He displays normal reflexes. No cranial nerve deficit.   Skin: Skin is warm and dry. No lesion and no rash noted.   Psychiatric: He has a normal mood and affect. His behavior is normal. His mood appears not anxious. His speech is not rapid and/or pressured. He is not agitated and not aggressive. He does not exhibit a depressed mood.             Assessment and Plan:     Problem List Items Addressed This Visit        Cardiac/Vascular    Essential hypertension - bp looks good. Stable.       HLD (hyperlipidemia) - on crestor . Get lab result.       Coronary artery disease involving coronary bypass graft of native heart with angina pectoris - Primary -  follow-up with Dr Gorman.        Renal/    ESRD (end stage renal disease) on dialysis - continue dialysis.       Anemia in chronic kidney disease, on chronic dialysis - get lab results. Chronic.        Hematology    Anticoagulated - stable. He is on aspirin.        Endocrine    Acquired hypothyroidism - we need to see TSH levels. He thinks his TSH was abnormal.                  Follow-up with me  in 6 months.

## 2019-05-07 ENCOUNTER — OFFICE VISIT (OUTPATIENT)
Dept: UROLOGY | Facility: CLINIC | Age: 84
End: 2019-05-07
Payer: MEDICARE

## 2019-05-07 VITALS — HEIGHT: 70 IN | WEIGHT: 169 LBS | BODY MASS INDEX: 24.2 KG/M2

## 2019-05-07 DIAGNOSIS — N18.6 ESRD (END STAGE RENAL DISEASE) ON DIALYSIS: ICD-10-CM

## 2019-05-07 DIAGNOSIS — D63.1 ANEMIA IN CHRONIC KIDNEY DISEASE, ON CHRONIC DIALYSIS: Primary | ICD-10-CM

## 2019-05-07 DIAGNOSIS — N18.6 ANEMIA IN CHRONIC KIDNEY DISEASE, ON CHRONIC DIALYSIS: Primary | ICD-10-CM

## 2019-05-07 DIAGNOSIS — Z99.2 ANEMIA IN CHRONIC KIDNEY DISEASE, ON CHRONIC DIALYSIS: Primary | ICD-10-CM

## 2019-05-07 DIAGNOSIS — R39.12 BENIGN PROSTATIC HYPERPLASIA WITH WEAK URINARY STREAM: ICD-10-CM

## 2019-05-07 DIAGNOSIS — N40.1 BENIGN PROSTATIC HYPERPLASIA WITH WEAK URINARY STREAM: ICD-10-CM

## 2019-05-07 DIAGNOSIS — N28.1 RENAL CYST: ICD-10-CM

## 2019-05-07 DIAGNOSIS — Z99.2 ESRD (END STAGE RENAL DISEASE) ON DIALYSIS: ICD-10-CM

## 2019-05-07 PROCEDURE — 99999 PR PBB SHADOW E&M-EST. PATIENT-LVL III: ICD-10-PCS | Mod: PBBFAC,,, | Performed by: UROLOGY

## 2019-05-07 PROCEDURE — 99213 OFFICE O/P EST LOW 20 MIN: CPT | Mod: PBBFAC,PO | Performed by: UROLOGY

## 2019-05-07 PROCEDURE — 99214 PR OFFICE/OUTPT VISIT, EST, LEVL IV, 30-39 MIN: ICD-10-PCS | Mod: S$PBB,,, | Performed by: UROLOGY

## 2019-05-07 PROCEDURE — 99999 PR PBB SHADOW E&M-EST. PATIENT-LVL III: CPT | Mod: PBBFAC,,, | Performed by: UROLOGY

## 2019-05-07 PROCEDURE — 99214 OFFICE O/P EST MOD 30 MIN: CPT | Mod: S$PBB,,, | Performed by: UROLOGY

## 2019-05-07 NOTE — PROGRESS NOTES
Subjective:       Patient ID: Jose Maria Jr. is a 86 y.o. male.    Chief Complaint: Annual Exam    86 y.o. WM with BPH and Renal Cyst. Here for f/u    Benign Prostatic Hypertrophy   This is a chronic problem. The current episode started more than 1 year ago. The problem has been waxing and waning since onset. Irritative symptoms do not include frequency (2-3 x's daily), nocturia or urgency. Obstructive symptoms include a weak stream. Obstructive symptoms do not include dribbling, incomplete emptying, an intermittent stream, a slower stream or straining. Pertinent negatives include no chills, dysuria, genital pain, hematuria, hesitancy, nausea or vomiting. AUA score is 0-7. He is not sexually active. Nothing aggravates the symptoms.     Review of Systems   Constitutional: Negative for activity change, appetite change, chills, diaphoresis, fatigue, fever and unexpected weight change.   HENT: Negative for congestion, hearing loss, sinus pressure and trouble swallowing.    Eyes: Negative for photophobia, pain, discharge and visual disturbance.   Respiratory: Negative for apnea, cough and shortness of breath.    Cardiovascular: Negative for chest pain, palpitations and leg swelling.   Gastrointestinal: Negative for abdominal distention, abdominal pain, anal bleeding, blood in stool, constipation, diarrhea, nausea, rectal pain and vomiting.   Endocrine: Negative for cold intolerance, heat intolerance, polydipsia, polyphagia and polyuria.   Genitourinary: Negative for decreased urine volume, difficulty urinating, discharge, dysuria, enuresis, flank pain, frequency (2-3 x's daily), genital sores, hematuria, hesitancy, incomplete emptying, nocturia, penile pain, penile swelling, scrotal swelling, testicular pain and urgency.   Musculoskeletal: Negative for arthralgias, back pain and myalgias.   Skin: Negative for color change, pallor, rash and wound.   Allergic/Immunologic: Negative for environmental allergies, food  allergies and immunocompromised state.   Neurological: Negative for dizziness, seizures, weakness and headaches.   Hematological: Negative for adenopathy. Does not bruise/bleed easily.   Psychiatric/Behavioral: Negative.        Objective:      Physical Exam   Nursing note and vitals reviewed.  Constitutional: He is oriented to person, place, and time. He appears well-developed and well-nourished.   HENT:   Head: Normocephalic.   Nose: Nose normal.   Mouth/Throat: Oropharynx is clear and moist.   Eyes: Conjunctivae and EOM are normal. Pupils are equal, round, and reactive to light.   Neck: Normal range of motion. Neck supple.   Cardiovascular: Normal rate, regular rhythm, normal heart sounds and intact distal pulses.    Pulmonary/Chest: Effort normal and breath sounds normal.   Abdominal: Soft. Bowel sounds are normal.   Genitourinary: Rectum normal, testes normal and penis normal. Prostate is enlarged. Circumcised.   Musculoskeletal: Normal range of motion.   Neurological: He is alert and oriented to person, place, and time. He has normal reflexes.   Skin: Skin is warm and dry.     Psychiatric: He has a normal mood and affect. His behavior is normal. Judgment and thought content normal.       Assessment:       1. Anemia in chronic kidney disease, on chronic dialysis    2. Benign prostatic hyperplasia with weak urinary stream    3. ESRD (end stage renal disease) on dialysis    4. Renal cyst        Plan:       Patient Instructions   Renal U/S  F/U 1 year  Continue  Flomax

## 2019-06-17 DIAGNOSIS — N28.1 RENAL CYST: ICD-10-CM

## 2019-06-17 DIAGNOSIS — R39.12 BENIGN PROSTATIC HYPERPLASIA WITH WEAK URINARY STREAM: ICD-10-CM

## 2019-06-17 DIAGNOSIS — N18.6 ESRD (END STAGE RENAL DISEASE) ON DIALYSIS: ICD-10-CM

## 2019-06-17 DIAGNOSIS — N40.1 BENIGN PROSTATIC HYPERPLASIA WITH WEAK URINARY STREAM: ICD-10-CM

## 2019-06-17 DIAGNOSIS — Z99.2 ESRD (END STAGE RENAL DISEASE) ON DIALYSIS: ICD-10-CM

## 2019-06-17 RX ORDER — FINASTERIDE 5 MG/1
5 TABLET, FILM COATED ORAL DAILY
Qty: 30 TABLET | Refills: 1 | OUTPATIENT
Start: 2019-06-17

## 2019-06-17 RX ORDER — TAMSULOSIN HYDROCHLORIDE 0.4 MG/1
CAPSULE ORAL
Qty: 30 CAPSULE | Refills: 1 | OUTPATIENT
Start: 2019-06-17

## 2019-06-17 NOTE — TELEPHONE ENCOUNTER
----- Message from Nallely Batista sent at 6/17/2019  3:06 PM CDT -----  Contact: [phone]/self  Patient states [facility] pharmacy needs to speak with your office regarding refills for medications listed below.  finasteride (PROSCAR) 5 mg tablet  tamsulosin (FLOMAX) 0.4 mg Cap

## 2019-07-10 ENCOUNTER — TELEPHONE (OUTPATIENT)
Dept: FAMILY MEDICINE | Facility: CLINIC | Age: 84
End: 2019-07-10

## 2019-07-10 NOTE — TELEPHONE ENCOUNTER
----- Message from Kathy Linda sent at 7/10/2019  2:29 PM CDT -----  Contact: Gonzales () [phone]  Lovewolfgang would like to speak with you about needing a signature on the patient's death certificate. Please advise

## 2019-07-10 NOTE — TELEPHONE ENCOUNTER
Spoke with Loverne, with [facility]. Gonzales was calling to find out which Dr would need to sign the death certificate. Informed Lovewolfgang that the physician whom pronounced the patient or the  is usually the physician that signs the certificate. Gonzales states that Dr Gorman pronounced to patient, and she would be contacting him.

## 2021-06-04 NOTE — TELEPHONE ENCOUNTER
----- Message from Nova Chand sent at 2/20/2017  3:09 PM CST -----  Contact: Ms Maria [phone]  REFILLS:   Patient is requesting a medication refill.   RX name: Hydralazine   Strength: 100 mg  Directions:   Pharmacy name: [facility]   Pharmacy phone #:      Cholesterol a bit high. To work on diet and exercise to help improve this.   Other labs good  Please re-order same labs for physical next year.

## 2024-05-22 NOTE — HPI
- continue verapamil and bidil    Mr. Maria is a 84 y/o gentleman with PMHx  MI x 2 s/p CABG, DM, CKD4, HTN, HLD, Hypothyroidism, and gout who presents to [facility] as a transfer from [facility] for evaluation of NSTEMI. Yesterday morning, the patient started experiencing generalized chest and abdominal tightness 7/10 in severity.  It was exacerbated by exertion and relieved with rest, and was associated with SOB and a cough productive of clear sputum. He denied having diaphoresis or associated radiation of his chest tightness. He otherwise denies any fevers, chills, n/v/d, or dysuria.     Mr. Maria was recently admitted by Dr. Gorman for NSTEMI, and states his pain yesterday was similar to the pain he had on that admission. He was treated medically at that time due to his renal function. The patient does not want to be on dialysis, and deferred angiogram from cardiology on this admission due to the likelihood of him receiving dialysis s/p angiogram. The risk of death due to large myocardial infarctions was explained to Mr. Maria, however he still deferred angiogram and PCI and opted for medical management.